# Patient Record
Sex: FEMALE | Race: ASIAN
[De-identification: names, ages, dates, MRNs, and addresses within clinical notes are randomized per-mention and may not be internally consistent; named-entity substitution may affect disease eponyms.]

---

## 2019-02-03 ENCOUNTER — HOSPITAL ENCOUNTER (INPATIENT)
Dept: HOSPITAL 10 - 6WM | Age: 59
LOS: 15 days | DRG: 100 | End: 2019-02-18
Attending: INTERNAL MEDICINE | Admitting: HOSPITALIST
Payer: COMMERCIAL

## 2019-02-03 ENCOUNTER — HOSPITAL ENCOUNTER (INPATIENT)
Dept: HOSPITAL 91 - MS1 | Age: 59
LOS: 15 days | DRG: 100 | End: 2019-02-18
Payer: COMMERCIAL

## 2019-02-03 VITALS
BODY MASS INDEX: 19.45 KG/M2 | HEIGHT: 57 IN | WEIGHT: 90.17 LBS | WEIGHT: 90.17 LBS | BODY MASS INDEX: 19.45 KG/M2 | HEIGHT: 57 IN

## 2019-02-03 DIAGNOSIS — A41.9: ICD-10-CM

## 2019-02-03 DIAGNOSIS — N39.0: ICD-10-CM

## 2019-02-03 DIAGNOSIS — J69.0: ICD-10-CM

## 2019-02-03 DIAGNOSIS — I47.1: ICD-10-CM

## 2019-02-03 DIAGNOSIS — J96.00: ICD-10-CM

## 2019-02-03 DIAGNOSIS — A92.31: ICD-10-CM

## 2019-02-03 DIAGNOSIS — F02.80: ICD-10-CM

## 2019-02-03 DIAGNOSIS — E87.1: ICD-10-CM

## 2019-02-03 DIAGNOSIS — E87.2: ICD-10-CM

## 2019-02-03 DIAGNOSIS — E78.5: ICD-10-CM

## 2019-02-03 DIAGNOSIS — G93.40: ICD-10-CM

## 2019-02-03 DIAGNOSIS — I10: ICD-10-CM

## 2019-02-03 DIAGNOSIS — G40.901: Primary | ICD-10-CM

## 2019-02-03 DIAGNOSIS — Z74.01: ICD-10-CM

## 2019-02-03 DIAGNOSIS — R65.21: ICD-10-CM

## 2019-02-03 DIAGNOSIS — Z66: ICD-10-CM

## 2019-02-03 DIAGNOSIS — D64.9: ICD-10-CM

## 2019-02-03 PROCEDURE — 36600 WITHDRAWAL OF ARTERIAL BLOOD: CPT

## 2019-02-03 PROCEDURE — 86692 HEPATITIS DELTA AGENT ANTBDY: CPT

## 2019-02-03 PROCEDURE — 71045 X-RAY EXAM CHEST 1 VIEW: CPT

## 2019-02-03 PROCEDURE — 83036 HEMOGLOBIN GLYCOSYLATED A1C: CPT

## 2019-02-03 PROCEDURE — 94770: CPT

## 2019-02-03 PROCEDURE — 87086 URINE CULTURE/COLONY COUNT: CPT

## 2019-02-03 PROCEDURE — 87081 CULTURE SCREEN ONLY: CPT

## 2019-02-03 PROCEDURE — 87340 HEPATITIS B SURFACE AG IA: CPT

## 2019-02-03 PROCEDURE — 97165 OT EVAL LOW COMPLEX 30 MIN: CPT

## 2019-02-03 PROCEDURE — 80053 COMPREHEN METABOLIC PANEL: CPT

## 2019-02-03 PROCEDURE — 82553 CREATINE MB FRACTION: CPT

## 2019-02-03 PROCEDURE — 85730 THROMBOPLASTIN TIME PARTIAL: CPT

## 2019-02-03 PROCEDURE — 80076 HEPATIC FUNCTION PANEL: CPT

## 2019-02-03 PROCEDURE — 93306 TTE W/DOPPLER COMPLETE: CPT

## 2019-02-03 PROCEDURE — 85025 COMPLETE CBC W/AUTO DIFF WBC: CPT

## 2019-02-03 PROCEDURE — 94002 VENT MGMT INPAT INIT DAY: CPT

## 2019-02-03 PROCEDURE — 80061 LIPID PANEL: CPT

## 2019-02-03 PROCEDURE — 87040 BLOOD CULTURE FOR BACTERIA: CPT

## 2019-02-03 PROCEDURE — 84484 ASSAY OF TROPONIN QUANT: CPT

## 2019-02-03 PROCEDURE — 92610 EVALUATE SWALLOWING FUNCTION: CPT

## 2019-02-03 PROCEDURE — 86803 HEPATITIS C AB TEST: CPT

## 2019-02-03 PROCEDURE — 82550 ASSAY OF CK (CPK): CPT

## 2019-02-03 PROCEDURE — 82140 ASSAY OF AMMONIA: CPT

## 2019-02-03 PROCEDURE — 94003 VENT MGMT INPAT SUBQ DAY: CPT

## 2019-02-03 PROCEDURE — 86704 HEP B CORE ANTIBODY TOTAL: CPT

## 2019-02-03 PROCEDURE — 80185 ASSAY OF PHENYTOIN TOTAL: CPT

## 2019-02-03 PROCEDURE — 81001 URINALYSIS AUTO W/SCOPE: CPT

## 2019-02-03 PROCEDURE — 80048 BASIC METABOLIC PNL TOTAL CA: CPT

## 2019-02-03 PROCEDURE — 80184 ASSAY OF PHENOBARBITAL: CPT

## 2019-02-03 PROCEDURE — 84439 ASSAY OF FREE THYROXINE: CPT

## 2019-02-03 PROCEDURE — 83735 ASSAY OF MAGNESIUM: CPT

## 2019-02-03 PROCEDURE — 70553 MRI BRAIN STEM W/O & W/DYE: CPT

## 2019-02-03 PROCEDURE — 83605 ASSAY OF LACTIC ACID: CPT

## 2019-02-03 PROCEDURE — 82803 BLOOD GASES ANY COMBINATION: CPT

## 2019-02-03 PROCEDURE — 84443 ASSAY THYROID STIM HORMONE: CPT

## 2019-02-03 PROCEDURE — 82270 OCCULT BLOOD FECES: CPT

## 2019-02-03 PROCEDURE — 76937 US GUIDE VASCULAR ACCESS: CPT

## 2019-02-03 PROCEDURE — 86592 SYPHILIS TEST NON-TREP QUAL: CPT

## 2019-02-03 PROCEDURE — 84100 ASSAY OF PHOSPHORUS: CPT

## 2019-02-03 PROCEDURE — 82962 GLUCOSE BLOOD TEST: CPT

## 2019-02-03 PROCEDURE — C9113 INJ PANTOPRAZOLE SODIUM, VIA: HCPCS

## 2019-02-03 PROCEDURE — 95819 EEG AWAKE AND ASLEEP: CPT

## 2019-02-03 PROCEDURE — 87070 CULTURE OTHR SPECIMN AEROBIC: CPT

## 2019-02-03 PROCEDURE — 36569 INSJ PICC 5 YR+ W/O IMAGING: CPT

## 2019-02-03 PROCEDURE — 85610 PROTHROMBIN TIME: CPT

## 2019-02-04 VITALS — DIASTOLIC BLOOD PRESSURE: 69 MMHG | SYSTOLIC BLOOD PRESSURE: 131 MMHG | RESPIRATION RATE: 19 BRPM | HEART RATE: 71 BPM

## 2019-02-04 VITALS — HEART RATE: 77 BPM | RESPIRATION RATE: 18 BRPM | DIASTOLIC BLOOD PRESSURE: 53 MMHG | SYSTOLIC BLOOD PRESSURE: 95 MMHG

## 2019-02-04 VITALS — DIASTOLIC BLOOD PRESSURE: 80 MMHG | SYSTOLIC BLOOD PRESSURE: 116 MMHG | RESPIRATION RATE: 18 BRPM | HEART RATE: 77 BPM

## 2019-02-04 VITALS — SYSTOLIC BLOOD PRESSURE: 98 MMHG | DIASTOLIC BLOOD PRESSURE: 53 MMHG | HEART RATE: 74 BPM | RESPIRATION RATE: 16 BRPM

## 2019-02-04 VITALS — HEART RATE: 85 BPM

## 2019-02-04 VITALS — HEART RATE: 76 BPM

## 2019-02-04 VITALS — HEART RATE: 77 BPM

## 2019-02-04 VITALS — HEART RATE: 73 BPM

## 2019-02-04 VITALS — SYSTOLIC BLOOD PRESSURE: 103 MMHG | DIASTOLIC BLOOD PRESSURE: 59 MMHG | HEART RATE: 69 BPM | RESPIRATION RATE: 18 BRPM

## 2019-02-04 VITALS — HEART RATE: 70 BPM

## 2019-02-04 LAB
ADD MAN DIFF?: NO
ADD UMIC: YES
ANION GAP: 12 (ref 5–13)
BASOPHIL #: 0.1 10^3/UL (ref 0–0.1)
BASOPHILS %: 0.7 % (ref 0–2)
BLOOD UREA NITROGEN: 4 MG/DL (ref 7–20)
CALCIUM: 8.6 MG/DL (ref 8.4–10.2)
CARBON DIOXIDE: 20 MMOL/L (ref 21–31)
CHLORIDE: 108 MMOL/L (ref 97–110)
CREATININE: 0.3 MG/DL (ref 0.44–1)
EOSINOPHILS #: 0.2 10^3/UL (ref 0–0.5)
EOSINOPHILS %: 2.7 % (ref 0–7)
FREE T4 (FREE THYROXINE): 1.8 NG/DL (ref 0.64–1.79)
GLUCOSE: 79 MG/DL (ref 70–220)
HEMATOCRIT: 31.4 % (ref 37–47)
HEMOGLOBIN: 10.9 G/DL (ref 12–16)
IMMATURE GRANS #M: 0.02 10^3/UL (ref 0–0.03)
IMMATURE GRANS % (M): 0.3 % (ref 0–0.43)
INR: 0.99
LYMPHOCYTES #: 0.9 10^3/UL (ref 0.8–2.9)
LYMPHOCYTES %: 12.2 % (ref 15–51)
MEAN CORPUSCULAR HEMOGLOBIN: 31.1 PG (ref 29–33)
MEAN CORPUSCULAR HGB CONC: 34.7 G/DL (ref 32–37)
MEAN CORPUSCULAR VOLUME: 89.5 FL (ref 82–101)
MEAN PLATELET VOLUME: 8.1 FL (ref 7.4–10.4)
MONOCYTE #: 0.4 10^3/UL (ref 0.3–0.9)
MONOCYTES %: 6.1 % (ref 0–11)
NEUTROPHIL #: 5.5 10^3/UL (ref 1.6–7.5)
NEUTROPHILS %: 78 % (ref 39–77)
NUCLEATED RED BLOOD CELLS #: 0 10^3/UL (ref 0–0)
NUCLEATED RED BLOOD CELLS%: 0 /100WBC (ref 0–0)
PARTIAL THROMBOPLASTIN TIME: 29.2 SEC (ref 23–35)
PLATELET COUNT: 293 10^3/UL (ref 140–415)
POTASSIUM: 3.2 MMOL/L (ref 3.5–5.1)
PROTIME: 13.2 SEC (ref 11.9–14.9)
PT RATIO: 1
RED BLOOD COUNT: 3.51 10^6/UL (ref 4.2–5.4)
RED CELL DISTRIBUTION WIDTH: 11.9 % (ref 11.5–14.5)
SODIUM: 140 MMOL/L (ref 135–144)
THYROID STIMULATING HORMONE: 1.94 MIU/L (ref 0.47–4.68)
UR ASCORBIC ACID: NEGATIVE MG/DL
UR BACTERIA: (no result) /HPF
UR BILIRUBIN (DIP): NEGATIVE MG/DL
UR BLOOD (DIP): (no result) MG/DL
UR BUDDING YEAST: (no result) /HPF
UR CLARITY: (no result)
UR COLOR: YELLOW
UR GLUCOSE (DIP): NEGATIVE MG/DL
UR KETONES (DIP): (no result) MG/DL
UR LEUKOCYTE ESTERASE (DIP): (no result) LEU/UL
UR MUCUS: (no result) /HPF
UR NITRITE (DIP): POSITIVE MG/DL
UR PH (DIP): 7 (ref 5–9)
UR RBC: 1 /HPF (ref 0–5)
UR SPECIFIC GRAVITY (DIP): 1.01 (ref 1–1.03)
UR TOTAL PROTEIN (DIP): NEGATIVE MG/DL
UR UROBILINOGEN (DIP): NEGATIVE MG/DL
UR WBC: 17 /HPF (ref 0–5)
WHITE BLOOD COUNT: 7 10^3/UL (ref 4.8–10.8)

## 2019-02-04 RX ADMIN — CEFTRIAXONE 1 MLS/HR: 1 INJECTION, SOLUTION INTRAVENOUS at 04:12

## 2019-02-04 RX ADMIN — LEVETIRACETAM 1 MLS/HR: 5 INJECTION INTRAVENOUS at 09:54

## 2019-02-04 RX ADMIN — POTASSIUM ACETATE 1 MLS/HR: 3.93 INJECTION, SOLUTION, CONCENTRATE INTRAVENOUS at 18:28

## 2019-02-04 RX ADMIN — POTASSIUM CHLORIDE SCH MLS/HR: 200 INJECTION, SOLUTION INTRAVENOUS at 15:21

## 2019-02-04 RX ADMIN — PANTOPRAZOLE SODIUM SCH MG: 40 INJECTION, POWDER, FOR SOLUTION INTRAVENOUS at 05:17

## 2019-02-04 RX ADMIN — CALCIUM GLUCONATE SCH MLS/HR: 94 INJECTION, SOLUTION INTRAVENOUS at 18:28

## 2019-02-04 RX ADMIN — POTASSIUM CHLORIDE 1 MLS/HR: 200 INJECTION, SOLUTION INTRAVENOUS at 18:28

## 2019-02-04 RX ADMIN — HEPARIN SODIUM 1 UNIT: 5000 INJECTION, SOLUTION INTRAVENOUS; SUBCUTANEOUS at 10:01

## 2019-02-04 RX ADMIN — POTASSIUM ACETATE 1 MLS/HR: 3.93 INJECTION, SOLUTION, CONCENTRATE INTRAVENOUS at 04:12

## 2019-02-04 RX ADMIN — LORAZEPAM 1 MG: 2 INJECTION, SOLUTION INTRAMUSCULAR; INTRAVENOUS at 03:57

## 2019-02-04 RX ADMIN — PANTOPRAZOLE SODIUM 1 MG: 40 INJECTION, POWDER, FOR SOLUTION INTRAVENOUS at 05:17

## 2019-02-04 RX ADMIN — POTASSIUM CHLORIDE SCH MLS/HR: 200 INJECTION, SOLUTION INTRAVENOUS at 18:28

## 2019-02-04 RX ADMIN — CALCIUM GLUCONATE SCH MLS/HR: 94 INJECTION, SOLUTION INTRAVENOUS at 04:12

## 2019-02-04 RX ADMIN — HEPARIN SODIUM 1 UNIT: 5000 INJECTION, SOLUTION INTRAVENOUS; SUBCUTANEOUS at 22:22

## 2019-02-04 RX ADMIN — HEPARIN SODIUM SCH UNIT: 5000 INJECTION, SOLUTION INTRAVENOUS; SUBCUTANEOUS at 22:22

## 2019-02-04 RX ADMIN — POTASSIUM CHLORIDE 1 MLS/HR: 200 INJECTION, SOLUTION INTRAVENOUS at 15:21

## 2019-02-04 RX ADMIN — CEFTRIAXONE SCH MLS/HR: 1 INJECTION, SOLUTION INTRAVENOUS at 04:12

## 2019-02-04 RX ADMIN — HEPARIN SODIUM SCH UNIT: 5000 INJECTION, SOLUTION INTRAVENOUS; SUBCUTANEOUS at 10:01

## 2019-02-04 NOTE — HP
DATE OF ADMISSION: 02/03/2019

 

CHIEF COMPLAINT:  Altered mental status and new seizures.

 

HISTORY OF PRESENT ILLNESS:  A 58-year-old female transferred from outside hospital, prior history of
 severe dementia, bedridden, nonverbal, cared for at home by , who was brought into the [facility] ER with altered mental status.  Apparently, she has had some subjective fevers at home rec
ently.

 

Family became concerned, especially when yesterday, before coming to the ER, they found the patient t
o be less responsive and possible seizure activity, which the patient has never had at home.  EMS was
 called.  She received Versed in the ER and then was transferred to the emergency room.  There, she w
as found with sodium of 127.  White count was slightly high at 11.  She initially had a high lactic a
felisa of 8.4 and she received around 2 liters of IV fluids because of low blood pressure.  The blood pr
essure improved after the IV fluids were given.  She came back to her baseline blood pressure.  She w
as also found with a UTI at the outside hospital.  She also received Keppra 500 mg IV because of the 
concern for seizures.  No upper or lower GI bleeding, no diarrhea or constipation.

 

PAST MEDICAL HISTORY:  Per records, prior history of West Nile virus infection, high cholesterol, hyp
ertension.

 

SOCIAL HISTORY:  Negative for smoking, drinking or IV drug abuse.

 

FAMILY HISTORY:  Positive for Alzheimer's disease in the mother.

 

PAST SURGICAL HISTORY:  Unknown.

 

PHYSICAL EXAMINATION:

VITAL SIGNS:  Today, T-max 97.9, pulse 70, respirations 18, blood pressure 103/59 satting at 98% on r
oom air.

GENERAL:  The patient is lying in bed, no acute distress.

HEENT:  Pupils equal, round and reactive to light.  Extraocular muscles intact.

NECK:  Supple, no thyromegaly.

LUNGS:  Clear to auscultation bilaterally.

CARDIOVASCULAR:  S1, S2 heard.  No rubs or gallops.

ABDOMEN:  Soft, nontender, nondistended.  Normal bowel sounds heard.  No rebound or guarding.

MUSCULOSKELETAL:  No lower extremity edema bilaterally.

NEUROLOGIC:  Unable to fully assess at this time.

 

LABORATORY DATA:  UA shows positive nitrites and positive leukocyte esterase.  WBC 11.2, hemoglobin 1
1.2, hematocrit 33, platelets 375.  Sodium 127, potassium 3.7, chloride 95, CO2 of 16, BUN of 4, crea
tinine 0.89, glucose of 147.  LFTs are essentially normal.  Lipase was normal.  Ammonia was normal.  
Head CT at the outside hospital showed no acute intracranial pathology.

 

ASSESSMENT AND PLAN:  A 58-year-old female with history of severe dementia, bedbound, nonverbal, high
 cholesterol, hypertension who comes in with altered mental status and seizure activity and hyponatre
veronica.

1.  Altered mental status could be secondary to the patient's UTI and new onset seizure activity.  Ad
jayme the patient.  Check TSH, A1c, lipid panel.  Get physical therapy and occupational therapy consult
s.  We will check EEG, do neuro checks every 4 hours.  Will get a neurology consult.  Start the patie
nt on low-dose Keppra for now.

2.  Urinary tract infection.  Again, we will continue IV fluids and broad-spectrum antibiotics.  Foll
ow up final culture results.

3.  Hypernatremia.  Monitor BMP.  Continue IV fluids.

4.  History of hypertension.  Blood pressure stable.  Continue hydralazine as needed p.r.n.

5.  High cholesterol.  Follow up lipid panel.

6.  History of severe dementia.  Monitor for now.  We will get Neurology consult as mentioned above.

 

 

Dictated By: LAURENCE TORRES/MAKENNA

DD:    02/04/2019 05:38:14

DT:    02/04/2019 06:22:05

Conf#: [id number]

DID#:  [id number]

CC: LAURENCE CASTELLANO;*EndCC*

## 2019-02-04 NOTE — CONS
Assessment/Plan


Assessment/Plan


Hospital Course


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 


p/w ams.


She was reported to have a seizure... for which neurology is consulted.





Of note, she was severely hyponatremic on arrival.


UA +





Most clinically consistent with a provoked seizure in the context of 


hyponatremia. 


New onset epilepsy is additionally considered. 





P:


MRI brain with and without contrast for further characterization


Await EEG to evaluate for epileptiform activity


Hold Keppra for now


Ativan IV PRN seizure > 5min or for cluster


Other medical management per primary


[place] as able 


Limit sedating medications where possible


PT/OT/ST as necessary


Will follow clinically





Consultation Date/Type/Reason


Admit Date/Time


Feb 3, 2019 at 22:12


Type of Consult


Neurology


Reason for Consultation


new onset seizures


Requesting Provider:  LAURENCE CASTELLANO


Date/Time of Note


DATE: 2/4/19 


TIME: 15:07





Hx of Present Illness


57 yo F with hx of WNV infection, chronic encephalopathy, and other 


comorbidities who presents with altered mental status and new onset seizures. 





History was obtained from chart review as pt is a poor historian. 





It is elsewhere noted:


HISTORY OF PRESENT ILLNESS:  A 58-year-old female transferred from outside 


hospital, prior history of severe dementia, bedridden, nonverbal, cared for at 


home by , who was brought into the outside hospital ER with altered 


mental status.  Apparently, she has had some subjective fevers at home recently.


 


Family became concerned, especially when yesterday, before coming to the ER, 


they found the patient to be less responsive and possible seizure activity, 


which the patient has never had at home.  EMS was called.  She received Versed 


in the ER and then was transferred to the emergency room.  There, she was found 


with sodium of 127.  White count was slightly high at 11.  She initially had a 


high lactic acid of 8.4 and she received around 2 liters of IV fluids because of


low blood pressure.  The blood pressure improved after the IV fluids were given.


 She came back to her baseline blood pressure.  She was also found with a UTI at


the outside hospital.  She also received Keppra 500 mg IV because of the concern


for seizures.  No upper or lower GI bleeding, no diarrhea or constipation.


Subjective hx not possible:  pt non-verbal





Exam/Review of Systems


Exam


Vitals





Vital Signs


  Date      Temp  Pulse  Resp  B/P (MAP)   Pulse Ox  O2          O2 Flow    FiO2


Time                                                 Delivery    Rate


    2/4/19           76


     12:00


    2/4/19  96.3           16  98/53 (68)       100  Nasal


     11:12                                           Cannula








Intake and Output





2/3/19


2/3/19


2/4/19





1515:00


23:00


07:00





IntakeIntake Total


150 ml





BalanceBalance


150 ml











Exam


PE:


Gen Appearance: No Apparent Distress


HEENT: Normocephalic


Cardiovascular: Regular rate


Abdomen: Soft


Extremities: Dry





NE:


The patient was obtunded and nonverbal. Grimaces to noxious stimuli. 





Cranial nerve examination was limited by mental status. Pupils were equal and 


reactive to light. There was no afferent pupillary defect. Funduscopic 


examination was limited. Face was grossly symmetric, w/ present corneal and 


cough reflexes.





Tone was spastic in BUE. Muscle bulk was diminished. I did not see 


fasciculations. The patient withdrew to noxious stimulation x 4.





Coordination and gait testing was limited by mental status.





Leg reflexes were brisk and symmetric. Richmond's sign was absent. Plantar 


responses were flexor.





Results


Result Diagram:  


2/4/19 0453                                                                     


          2/4/19 0452





Results 24hrs





Laboratory Tests


Test
                     2/4/19
04:48  2/4/19
04:52  2/4/19
04:53  2/4/19
14:15


Thyroid Stimulating           1.940  
  
             
             



Hormone
(TSH)


Prothrombin Time                              13.2


Prothrombin Time Ratio                         1.0


INR International         
                  0.99  
  
             



Normalized
Ratio


Activated                 
                  29.2  
  
             



Partial
Thromboplast


Time


Sodium Level                                   140


Potassium Level                               3.2  L


Chloride Level                                 108


Carbon Dioxide Level                           20  L


Anion Gap                                       12


Blood Urea Nitrogen                             4  L


Creatinine                                   0.30  L


Est Glomerular Filtrat    
             > 60  
       
             



Rate
mL/min


Glucose Level                                   79


Calcium Level                                  8.6


Free Thyroxine                               1.80  H


White Blood Count                                            7.0


Red Blood Count                                            3.51  L


Hemoglobin                                                 10.9  L


Hematocrit                                                 31.4  L


Mean Corpuscular Volume                                     89.5


Mean Corpuscular                                            31.1


Hemoglobin


Mean Corpuscular          
             
                  34.7  
  



Hemoglobin
Concent


Red Cell Distribution                                       11.9


Width


Platelet Count                                               293


Mean Platelet Volume                                         8.1


Immature Granulocytes %                                    0.300


Neutrophils %                                              78.0  H


Lymphocytes %                                              12.2  L


Monocytes %                                                  6.1


Eosinophils %                                                2.7


Basophils %                                                  0.7


Nucleated Red Blood                                          0.0


Cells %


Immature Granulocytes #                                    0.020


Neutrophils #                                                5.5


Lymphocytes #                                                0.9


Monocytes #                                                  0.4


Eosinophils #                                                0.2


Basophils #                                                  0.1


Nucleated Red Blood                                          0.0


Cells #


Urine Color                                                         YELLOW


Urine Clarity                                                       CLOUDY  A


Urine pH                                                                   7.0


Urine Specific Gravity                                                   1.009


Urine Ketones                                                              1+  H


Urine Nitrite                                                       POSITIVE  A


Urine Bilirubin                                                     NEGATIVE


Urine Urobilinogen                                                  NEGATIVE


Urine Leukocyte Esterase                                                   3+  H


Urine Microscopic RBC                                                        1


Urine Microscopic WBC                                                      17  H


Urine Bacteria                                                      FEW  A


Urine Mucus                                                         FEW  A


Urine Yeast (Budding)                                               FEW  A


Urine Hemoglobin                                                           1+  H


Urine Glucose                                                       NEGATIVE


Urine Total Protein                                                 NEGATIVE








Medications


Medication





Current Medications


IV Flush (NS 3 ml) 3 ml PER PROTOCOL IV ;  Start 2/4/19 at 03:30


Ondansetron HCl (Zofran Inj) 4 mg Q6H  PRN IV NAUSEA/VOMITING;  Start 2/4/19 at 


03:30


Acetaminophen (Tylenol Tab) 650 mg Q6H  PRN PO .PAIN 1-3 OR TEMP;  Start 2/4/19 


at 03:30


Acetaminophen/ Hydrocodone Bitart (Norco (5/325)) 1 tab Q6H  PRN PO .MOD PAIN 4-


6;  Start 2/4/19 at 03:30


Morphine Sulfate (morphine SULFATE (PF)) 2 mg Q4H  PRN IV .SEVERE PAIN 7-10;  


Start 2/4/19 at 03:30


Docusate Sodium (Colace) 100 mg Q12H  PRN PO .CONSTIPATION;  Start 2/4/19 at 


03:30


Magnesium Hydroxide (Milk Of Mag) 30 ml DAILY  PRN PO .CONSTIPATION;  Start 


2/4/19 at 03:30


Pantoprazole (Protonix Iv) 40 mg DAILY@06 IV  Last administered on 2/4/19at 


05:17; Admin Dose 40 MG;  Start 2/4/19 at 06:00


Heparin Sodium (Porcine) (Heparin  (5000 Units/1ml)) 5,000 unit Q12 SC  Last 


administered on 2/4/19at 10:01; Admin Dose 5,000 UNIT;  Start 2/4/19 at 09:00


Sodium Chloride 1,000 ml @  75 mls/hr [id number] IV  Last administered on 2/4/19at 


04:12; Admin Dose 75 MLS/HR;  Start 2/4/19 at 03:12


Albuterol/ Ipratropium (Duoneb) 3 ml Q4H RESP THERAPY  PRN HHN SHORTNESS OF 


BREATH;  Start 2/4/19 at 03:30


Hydralazine HCl (Apresoline) 10 mg Q6H  PRN IV ELEVATED BLOOD PRESSURE;  Start 


2/4/19 at 03:30


Ceftriaxone Sodium 50 ml @  100 mls/hr Q24H IVPB  Last administered on 2/4/19at 


04:12; Admin Dose 100 MLS/HR;  Start 2/4/19 at 03:30


Nitroglycerin (Nitroglycerin (Sl Tab) 0.4 Mg) 1 tab Q5M  PRN SL ANGINA;  Start 


2/4/19 at 03:30


Levetiracetam 100 ml @  400 mls/hr Q12 IVPB  Last administered on 2/4/19at 


09:54; Admin Dose 400 MLS/HR;  Start 2/4/19 at 09:00


Lorazepam (Ativan) 1 mg Q2H  PRN IV SEIZURES Last administered on 2/4/19at 


03:57; Admin Dose 1 MG;  Start 2/4/19 at 03:30


Miscellaneous Information (Pending [facility] Order For Wound Care) This patient 


ha... PRN  PRN XX WOUND CARE;  Start 2/4/19 at 05:00


Potassium Chloride 100 ml @  50 mls/hr Q2H IVPB ;  Start 2/4/19 at 14:30;  Stop 


2/4/19 at 18:29





Past Medical History


reviewed


Home Meds


Reported Medications


Cyanocobalamin* (Vitamin B-12*) 50 Mcg Tablet, 50 MCG PO DAILY, TAB


   2/4/19


Amlodipine Besylate* (Amlodipine Besylate*) 2.5 Mg Tablet, 5 MG PO DAILY, #30 


TAB


   2/4/19


Risperidone* (Risperidone*) 0.5 Mg Tablet, 0.5 MG PO DAILY, TAB


   2/4/19


Lamotrigine* (Lamotrigine*) 25 Mg Tablet, 25 MG PO DAILY, TAB


   2/4/19


Benztropine Mesylate* (Benztropine Mesylate*) 2 Mg Tablet, 2 MG PO BID, TAB


   2/4/19


Medications





Current Medications


IV Flush (NS 3 ml) 3 ml PER PROTOCOL IV ;  Start 2/4/19 at 03:30


Ondansetron HCl (Zofran Inj) 4 mg Q6H  PRN IV NAUSEA/VOMITING;  Start 2/4/19 at 


03:30


Acetaminophen (Tylenol Tab) 650 mg Q6H  PRN PO .PAIN 1-3 OR TEMP;  Start 2/4/19 


at 03:30


Acetaminophen/ Hydrocodone Bitart (Norco (5/325)) 1 tab Q6H  PRN PO .MOD PAIN 4-


6;  Start 2/4/19 at 03:30


Morphine Sulfate (morphine SULFATE (PF)) 2 mg Q4H  PRN IV .SEVERE PAIN 7-10;  


Start 2/4/19 at 03:30


Docusate Sodium (Colace) 100 mg Q12H  PRN PO .CONSTIPATION;  Start 2/4/19 at 


03:30


Magnesium Hydroxide (Milk Of Mag) 30 ml DAILY  PRN PO .CONSTIPATION;  Start 


2/4/19 at 03:30


Pantoprazole (Protonix Iv) 40 mg DAILY@06 IV  Last administered on 2/4/19at 


05:17; Admin Dose 40 MG;  Start 2/4/19 at 06:00


Heparin Sodium (Porcine) (Heparin  (5000 Units/1ml)) 5,000 unit Q12 SC  Last 


administered on 2/4/19at 10:01; Admin Dose 5,000 UNIT;  Start 2/4/19 at 09:00


Sodium Chloride 1,000 ml @  75 mls/hr [id number] IV  Last administered on 2/4/19at 


04:12; Admin Dose 75 MLS/HR;  Start 2/4/19 at 03:12


Albuterol/ Ipratropium (Duoneb) 3 ml Q4H RESP THERAPY  PRN HHN SHORTNESS OF JOSELYN


ATH;  Start 2/4/19 at 03:30


Hydralazine HCl (Apresoline) 10 mg Q6H  PRN IV ELEVATED BLOOD PRESSURE;  Start 


2/4/19 at 03:30


Ceftriaxone Sodium 50 ml @  100 mls/hr Q24H IVPB  Last administered on 2/4/19at 


04:12; Admin Dose 100 MLS/HR;  Start 2/4/19 at 03:30


Nitroglycerin (Nitroglycerin (Sl Tab) 0.4 Mg) 1 tab Q5M  PRN SL ANGINA;  Start 


2/4/19 at 03:30


Levetiracetam 100 ml @  400 mls/hr Q12 IVPB  Last administered on 2/4/19at 


09:54; Admin Dose 400 MLS/HR;  Start 2/4/19 at 09:00


Lorazepam (Ativan) 1 mg Q2H  PRN IV SEIZURES Last administered on 2/4/19at 


03:57; Admin Dose 1 MG;  Start 2/4/19 at 03:30


Miscellaneous Information (Pending [facility] Order For Wound Care) This patient 


ha... PRN  PRN XX WOUND CARE;  Start 2/4/19 at 05:00


Potassium Chloride 100 ml @  50 mls/hr Q2H IVPB ;  Start 2/4/19 at 14:30;  Stop 


2/4/19 at 18:29


Allergies:  


Coded Allergies:  


     No Known Allergy (Unverified , 2/4/19)





Past Surgical History


reviewed





Social History


reviewed


Smoking Status:  Never smoker











JULIETA SINGH NP           Feb 4, 2019 15:07


JOSE FRANCOIS                  Feb 4, 2019 17:53

## 2019-02-04 NOTE — PN
Date/Time of Note


Date/Time of Note


DATE: 2/4/19 


TIME: 12:30





Assessment/Plan


VTE Prophylaxis


Risk score (from Nsg)>0 risk:  3


SCD applied (from Ns):  Yes


Pharmacological prophylaxis:  heparin





Lines/Catheters


IV Catheter Type (from Nrs):  Saline Lock





Assessment/Plan


Hospital Course


S: sl;eeping comfortably, was medicatied with ativaln for agitation





O:


Constitutional:  sleeping, did not try to arouse


Head:  atraumatic, normocephalic, NC 


Neck:  non-tender, supple


Respiratory:  clear to auscultation, diminished 


Cardiovascular:  regular rate and rhythm


Gastrointestinal:  S/ NT / ND / +BS


Extremities:  no edema, good radial pulses, frail, 





assessment and Plan:


57 yo F with a slow early onset dementia, cause unknown who is non verbal at 


baseline who was gregg in for abnormal jerking behaviour concerning for 


seizures 





1. Altered mentation with concern for possible new onset seizures


   -Brain CT at outside hospital negative per report


2.  Urinary tract infection per report


3.  Chronic dementia/encephalopathy, nonverbal at baseline?


4.  History of West Nile virus infection, causing #3?


5.  Chronic dyslipidemia


6.  Chronic hypertension


7. Chronic psychiatric d/c on risperdone with benztropine for tardive dyskinesia


?


   -dose of benztropine recently reduced 





Plan :


   -we will order MRI of the brain, continue antiseizure medications, await 


neurology review


   - concerned that reduction in dose of benztropine may be contributory


   - does not want chest compressions but wants short term intubation if 


neccesary and meds administered. Does not want shocks as well


   -Continue broad-spectrum antibiotics, follow-up urinalysis and urine culture,


further interventions per course.


Result Diagram:  


2/4/19 0453                                                                     


          2/4/19 0452





Results 24hrs





Laboratory Tests


        Test
                                2/4/19
04:52  2/4/19
04:53


        Prothrombin Time                           13.2


        Prothrombin Time Ratio                      1.0


        INR International Normalized
Ratio        0.99  
  



        Activated Partial
Thromboplast Time       29.2  
  



        Sodium Level                                140


        Potassium Level                            3.2  L


        Chloride Level                              108


        Carbon Dioxide Level                        20  L


        Anion Gap                                    12


        Blood Urea Nitrogen                          4  L


        Creatinine                                0.30  L


        Est Glomerular Filtrat Rate
mL/min   > 60  
       



        Glucose Level                                79


        Calcium Level                               8.6


        Free Thyroxine                            1.80  H


        White Blood Count                                         7.0


        Red Blood Count                                         3.51  L


        Hemoglobin                                              10.9  L


        Hematocrit                                              31.4  L


        Mean Corpuscular Volume                                  89.5


        Mean Corpuscular Hemoglobin                              31.1


        Mean Corpuscular Hemoglobin
Concent  
                  34.7  



        Red Cell Distribution Width                              11.9


        Platelet Count                                            293


        Mean Platelet Volume                                      8.1


        Immature Granulocytes %                                 0.300


        Neutrophils %                                           78.0  H


        Lymphocytes %                                           12.2  L


        Monocytes %                                               6.1


        Eosinophils %                                             2.7


        Basophils %                                               0.7


        Nucleated Red Blood Cells %                               0.0


        Immature Granulocytes #                                 0.020


        Neutrophils #                                             5.5


        Lymphocytes #                                             0.9


        Monocytes #                                               0.4


        Eosinophils #                                             0.2


        Basophils #                                               0.1


        Nucleated Red Blood Cells #                               0.0








Exam/Review of Systems


Exam


Vitals





Vital Signs


  Date      Temp  Pulse  Resp  B/P (MAP)   Pulse Ox  O2          O2 Flow    FiO2


Time                                                 Delivery    Rate


    2/4/19  96.3     74    16  98/53 (68)       100  Nasal


     11:12                                           Cannula








Intake and Output





2/3/19


2/3/19


2/4/19





1515:00


23:00


07:00





IntakeIntake Total


150 ml





BalanceBalance


150 ml














Results


Results 24hrs





Laboratory Tests


        Test
                                2/4/19
04:52  2/4/19
04:53


        Prothrombin Time                           13.2


        Prothrombin Time Ratio                      1.0


        INR International Normalized
Ratio        0.99  
  



        Activated Partial
Thromboplast Time       29.2  
  



        Sodium Level                                140


        Potassium Level                            3.2  L


        Chloride Level                              108


        Carbon Dioxide Level                        20  L


        Anion Gap                                    12


        Blood Urea Nitrogen                          4  L


        Creatinine                                0.30  L


        Est Glomerular Filtrat Rate
mL/min   > 60  
       



        Glucose Level                                79


        Calcium Level                               8.6


        Free Thyroxine                            1.80  H


        White Blood Count                                         7.0


        Red Blood Count                                         3.51  L


        Hemoglobin                                              10.9  L


        Hematocrit                                              31.4  L


        Mean Corpuscular Volume                                  89.5


        Mean Corpuscular Hemoglobin                              31.1


        Mean Corpuscular Hemoglobin
Concent  
                  34.7  



        Red Cell Distribution Width                              11.9


        Platelet Count                                            293


        Mean Platelet Volume                                      8.1


        Immature Granulocytes %                                 0.300


        Neutrophils %                                           78.0  H


        Lymphocytes %                                           12.2  L


        Monocytes %                                               6.1


        Eosinophils %                                             2.7


        Basophils %                                               0.7


        Nucleated Red Blood Cells %                               0.0


        Immature Granulocytes #                                 0.020


        Neutrophils #                                             5.5


        Lymphocytes #                                             0.9


        Monocytes #                                               0.4


        Eosinophils #                                             0.2


        Basophils #                                               0.1


        Nucleated Red Blood Cells #                               0.0








Medications


Medication





Current Medications


IV Flush (NS 3 ml) 3 ml PER PROTOCOL IV ;  Start 2/4/19 at 03:30


Ondansetron HCl (Zofran Inj) 4 mg Q6H  PRN IV NAUSEA/VOMITING;  Start 2/4/19 at 


03:30


Acetaminophen (Tylenol Tab) 650 mg Q6H  PRN PO .PAIN 1-3 OR TEMP;  Start 2/4/19 


at 03:30


Acetaminophen/ Hydrocodone Bitart (Norco (5/325)) 1 tab Q6H  PRN PO .MOD PAIN 4-


6;  Start 2/4/19 at 03:30


Morphine Sulfate (morphine SULFATE (PF)) 2 mg Q4H  PRN IV .SEVERE PAIN 7-10;  


Start 2/4/19 at 03:30


Docusate Sodium (Colace) 100 mg Q12H  PRN PO .CONSTIPATION;  Start 2/4/19 at 


03:30


Magnesium Hydroxide (Milk Of Mag) 30 ml DAILY  PRN PO .CONSTIPATION;  Start 


2/4/19 at 03:30


Pantoprazole (Protonix Iv) 40 mg DAILY@06 IV  Last administered on 2/4/19at 


05:17; Admin Dose 40 MG;  Start 2/4/19 at 06:00


Heparin Sodium (Porcine) (Heparin  (5000 Units/1ml)) 5,000 unit Q12 SC  Last 


administered on 2/4/19at 10:01; Admin Dose 5,000 UNIT;  Start 2/4/19 at 09:00


Sodium Chloride 1,000 ml @  75 mls/hr [id number] IV  Last administered on 2/4/19at 


04:12; Admin Dose 75 MLS/HR;  Start 2/4/19 at 03:12


Albuterol/ Ipratropium (Duoneb) 3 ml Q4H RESP THERAPY  PRN HHN SHORTNESS OF 


BREATH;  Start 2/4/19 at 03:30


Hydralazine HCl (Apresoline) 10 mg Q6H  PRN IV ELEVATED BLOOD PRESSURE;  Start 


2/4/19 at 03:30


Ceftriaxone Sodium 50 ml @  100 mls/hr Q24H IVPB  Last administered on 2/4/19at 


04:12; Admin Dose 100 MLS/HR;  Start 2/4/19 at 03:30


Nitroglycerin (Nitroglycerin (Sl Tab) 0.4 Mg) 1 tab Q5M  PRN SL ANGINA;  Start 


2/4/19 at 03:30


Levetiracetam 100 ml @  400 mls/hr Q12 IVPB  Last administered on 2/4/19at 


09:54; Admin Dose 400 MLS/HR;  Start 2/4/19 at 09:00


Lorazepam (Ativan) 1 mg Q2H  PRN IV SEIZURES Last administered on 2/4/19at 


03:57; Admin Dose 1 MG;  Start 2/4/19 at 03:30


Miscellaneous Information (Pending Santyl Order For Wound Care) This patient 


ha... PRN  PRN XX WOUND CARE;  Start 2/4/19 at 05:00











TEJAS SALMERON                 Feb 4, 2019 12:30

## 2019-02-05 VITALS — HEART RATE: 70 BPM | DIASTOLIC BLOOD PRESSURE: 96 MMHG | RESPIRATION RATE: 21 BRPM | SYSTOLIC BLOOD PRESSURE: 141 MMHG

## 2019-02-05 VITALS — HEART RATE: 75 BPM

## 2019-02-05 VITALS — DIASTOLIC BLOOD PRESSURE: 75 MMHG | RESPIRATION RATE: 19 BRPM | SYSTOLIC BLOOD PRESSURE: 125 MMHG | HEART RATE: 86 BPM

## 2019-02-05 VITALS — HEART RATE: 57 BPM | SYSTOLIC BLOOD PRESSURE: 145 MMHG | RESPIRATION RATE: 18 BRPM | DIASTOLIC BLOOD PRESSURE: 95 MMHG

## 2019-02-05 VITALS — SYSTOLIC BLOOD PRESSURE: 113 MMHG | HEART RATE: 85 BPM | RESPIRATION RATE: 15 BRPM | DIASTOLIC BLOOD PRESSURE: 59 MMHG

## 2019-02-05 VITALS — SYSTOLIC BLOOD PRESSURE: 115 MMHG | DIASTOLIC BLOOD PRESSURE: 81 MMHG | RESPIRATION RATE: 14 BRPM | HEART RATE: 53 BPM

## 2019-02-05 VITALS — SYSTOLIC BLOOD PRESSURE: 106 MMHG | HEART RATE: 57 BPM | RESPIRATION RATE: 14 BRPM | DIASTOLIC BLOOD PRESSURE: 80 MMHG

## 2019-02-05 VITALS — SYSTOLIC BLOOD PRESSURE: 59 MMHG | HEART RATE: 63 BPM | DIASTOLIC BLOOD PRESSURE: 49 MMHG | RESPIRATION RATE: 14 BRPM

## 2019-02-05 VITALS — DIASTOLIC BLOOD PRESSURE: 53 MMHG | HEART RATE: 62 BPM | RESPIRATION RATE: 14 BRPM | SYSTOLIC BLOOD PRESSURE: 64 MMHG

## 2019-02-05 VITALS — HEART RATE: 59 BPM | DIASTOLIC BLOOD PRESSURE: 89 MMHG | SYSTOLIC BLOOD PRESSURE: 136 MMHG | RESPIRATION RATE: 14 BRPM

## 2019-02-05 VITALS — HEART RATE: 66 BPM

## 2019-02-05 VITALS — SYSTOLIC BLOOD PRESSURE: 133 MMHG | RESPIRATION RATE: 24 BRPM | DIASTOLIC BLOOD PRESSURE: 93 MMHG | HEART RATE: 61 BPM

## 2019-02-05 VITALS — DIASTOLIC BLOOD PRESSURE: 73 MMHG | RESPIRATION RATE: 18 BRPM | SYSTOLIC BLOOD PRESSURE: 119 MMHG | HEART RATE: 73 BPM

## 2019-02-05 VITALS — HEART RATE: 69 BPM | DIASTOLIC BLOOD PRESSURE: 71 MMHG | SYSTOLIC BLOOD PRESSURE: 120 MMHG | RESPIRATION RATE: 18 BRPM

## 2019-02-05 VITALS — HEART RATE: 71 BPM

## 2019-02-05 VITALS — RESPIRATION RATE: 14 BRPM

## 2019-02-05 VITALS — RESPIRATION RATE: 14 BRPM | HEART RATE: 58 BPM | DIASTOLIC BLOOD PRESSURE: 103 MMHG | SYSTOLIC BLOOD PRESSURE: 172 MMHG

## 2019-02-05 VITALS — HEART RATE: 64 BPM | DIASTOLIC BLOOD PRESSURE: 59 MMHG | RESPIRATION RATE: 14 BRPM | SYSTOLIC BLOOD PRESSURE: 71 MMHG

## 2019-02-05 VITALS — HEART RATE: 56 BPM

## 2019-02-05 VITALS — RESPIRATION RATE: 14 BRPM | SYSTOLIC BLOOD PRESSURE: 56 MMHG | HEART RATE: 63 BPM | DIASTOLIC BLOOD PRESSURE: 47 MMHG

## 2019-02-05 VITALS — SYSTOLIC BLOOD PRESSURE: 91 MMHG | HEART RATE: 63 BPM | RESPIRATION RATE: 8 BRPM | DIASTOLIC BLOOD PRESSURE: 68 MMHG

## 2019-02-05 VITALS — RESPIRATION RATE: 14 BRPM | SYSTOLIC BLOOD PRESSURE: 154 MMHG | DIASTOLIC BLOOD PRESSURE: 94 MMHG | HEART RATE: 57 BPM

## 2019-02-05 VITALS — SYSTOLIC BLOOD PRESSURE: 122 MMHG | DIASTOLIC BLOOD PRESSURE: 88 MMHG | HEART RATE: 56 BPM | RESPIRATION RATE: 14 BRPM

## 2019-02-05 VITALS — DIASTOLIC BLOOD PRESSURE: 80 MMHG | RESPIRATION RATE: 14 BRPM | SYSTOLIC BLOOD PRESSURE: 124 MMHG | HEART RATE: 56 BPM

## 2019-02-05 VITALS — SYSTOLIC BLOOD PRESSURE: 70 MMHG | DIASTOLIC BLOOD PRESSURE: 59 MMHG | RESPIRATION RATE: 14 BRPM | HEART RATE: 63 BPM

## 2019-02-05 VITALS — DIASTOLIC BLOOD PRESSURE: 77 MMHG | RESPIRATION RATE: 15 BRPM | HEART RATE: 60 BPM | SYSTOLIC BLOOD PRESSURE: 134 MMHG

## 2019-02-05 VITALS — HEART RATE: 74 BPM

## 2019-02-05 LAB
ADD MAN DIFF?: NO
ALANINE AMINOTRANSFERASE: 19 IU/L (ref 13–69)
ALBUMIN: 3.2 G/DL (ref 3.3–4.9)
ALKALINE PHOSPHATASE: < 20 IU/L (ref 42–121)
ALLEN TEST: (no result)
ANION GAP: 7 (ref 5–13)
ARTERIAL BASE EXCESS: -7.2 MMOL/L (ref -3–3)
ARTERIAL BLOOD GAS OXYGEN SAT: 99.5 MMHG (ref 95–98)
ARTERIAL COHB: 0.3 % (ref 0–3)
ARTERIAL FRACTION OF OXYHGB: 98.8 % (ref 93–99)
ARTERIAL HCO3: 16 MMOL/L (ref 22–26)
ARTERIAL METHB: 0.4 % (ref 0–1.5)
ARTERIAL PCO2: 26.4 MMHG (ref 35–45)
ASPARTATE AMINO TRANSFERASE: 26 IU/L (ref 15–46)
BASOPHIL #: 0.1 10^3/UL (ref 0–0.1)
BASOPHILS %: 0.5 % (ref 0–2)
BILIRUBIN,DIRECT: 0 MG/DL (ref 0–0.2)
BILIRUBIN,TOTAL: 0.1 MG/DL (ref 0.2–1.3)
BLOOD GAS LOW PEEP SETTING: 5 CMH2O
BLOOD GAS PIP: 23
BLOOD GAS TIDAL VOLUME: 450 ML
BLOOD UREA NITROGEN: 3 MG/DL (ref 7–20)
CALCIUM: 8.8 MG/DL (ref 8.4–10.2)
CARBON DIOXIDE: 19 MMOL/L (ref 21–31)
CHLORIDE: 105 MMOL/L (ref 97–110)
CHOL/HDL RATIO: 3.8 RATIO
CHOLESTEROL: 173 MG/DL (ref 100–200)
CREATININE: 0.27 MG/DL (ref 0.44–1)
EOSINOPHILS #: 0.1 10^3/UL (ref 0–0.5)
EOSINOPHILS %: 1.3 % (ref 0–7)
FIO2: 100 %
GLUCOSE: 74 MG/DL (ref 70–220)
HDL CHOLESTEROL: 45 MG/DL (ref 35–98)
HEMATOCRIT: 35.8 % (ref 37–47)
HEMOGLOBIN A1C: 5.5 % (ref 0–5.9)
HEMOGLOBIN: 12.4 G/DL (ref 12–16)
IMMATURE GRANS #M: 0.02 10^3/UL (ref 0–0.03)
IMMATURE GRANS % (M): 0.2 % (ref 0–0.43)
LDL CHOLESTEROL,CALCULATED: 116 MG/DL
LYMPHOCYTES #: 0.8 10^3/UL (ref 0.8–2.9)
LYMPHOCYTES %: 7.3 % (ref 15–51)
MAGNESIUM: 1.7 MG/DL (ref 1.7–2.5)
MEAN CORPUSCULAR HEMOGLOBIN: 31.4 PG (ref 29–33)
MEAN CORPUSCULAR HGB CONC: 34.6 G/DL (ref 32–37)
MEAN CORPUSCULAR VOLUME: 90.6 FL (ref 82–101)
MEAN PLATELET VOLUME: 9.5 FL (ref 7.4–10.4)
MODE: (no result)
MONOCYTE #: 0.9 10^3/UL (ref 0.3–0.9)
MONOCYTES %: 7.8 % (ref 0–11)
NEUTROPHIL #: 9.3 10^3/UL (ref 1.6–7.5)
NEUTROPHILS %: 82.9 % (ref 39–77)
NUCLEATED RED BLOOD CELLS #: 0 10^3/UL (ref 0–0)
NUCLEATED RED BLOOD CELLS%: 0 /100WBC (ref 0–0)
O2 A-A PPRESDIFF RESPIRATORY: 60.7 MMHG (ref 7–24)
PHENYTOIN (DILANTIN): 27 UG/ML (ref 10–20)
PHOSPHORUS: 4.1 MG/DL (ref 2.5–4.9)
PLATELET COUNT: 270 10^3/UL (ref 140–415)
POTASSIUM: 4.4 MMOL/L (ref 3.5–5.1)
RED BLOOD COUNT: 3.95 10^6/UL (ref 4.2–5.4)
RED CELL DISTRIBUTION WIDTH: 11.9 % (ref 11.5–14.5)
SODIUM: 131 MMOL/L (ref 135–144)
THYROID STIMULATING HORMONE: 1.56 MIU/L (ref 0.47–4.68)
TOTAL PROTEIN: 5.8 G/DL (ref 6.1–8.1)
TRIGLYCERIDES: 60 MG/DL (ref 0–149)
WHITE BLOOD COUNT: 11.2 10^3/UL (ref 4.8–10.8)

## 2019-02-05 PROCEDURE — 5A1955Z RESPIRATORY VENTILATION, GREATER THAN 96 CONSECUTIVE HOURS: ICD-10-PCS | Performed by: EMERGENCY MEDICINE

## 2019-02-05 PROCEDURE — 5A1955Z RESPIRATORY VENTILATION, GREATER THAN 96 CONSECUTIVE HOURS: ICD-10-PCS

## 2019-02-05 PROCEDURE — 0BH17EZ INSERTION OF ENDOTRACHEAL AIRWAY INTO TRACHEA, VIA NATURAL OR ARTIFICIAL OPENING: ICD-10-PCS

## 2019-02-05 PROCEDURE — 0BH17EZ INSERTION OF ENDOTRACHEAL AIRWAY INTO TRACHEA, VIA NATURAL OR ARTIFICIAL OPENING: ICD-10-PCS | Performed by: EMERGENCY MEDICINE

## 2019-02-05 RX ADMIN — HEPARIN SODIUM 1 UNIT: 5000 INJECTION, SOLUTION INTRAVENOUS; SUBCUTANEOUS at 10:52

## 2019-02-05 RX ADMIN — CEFTRIAXONE SCH MLS/HR: 1 INJECTION, SOLUTION INTRAVENOUS at 04:12

## 2019-02-05 RX ADMIN — LEVETIRACETAM 1 MLS/HR: 5 INJECTION INTRAVENOUS at 23:00

## 2019-02-05 RX ADMIN — LEVETIRACETAM 1 MLS/HR: 5 INJECTION INTRAVENOUS at 22:50

## 2019-02-05 RX ADMIN — Medication SCH MLS/HR: at 21:00

## 2019-02-05 RX ADMIN — SODIUM CHLORIDE 1 MLS/HR: 900 INJECTION, SOLUTION INTRAVENOUS at 08:57

## 2019-02-05 RX ADMIN — PROPOFOL 1 MLS/HR: 10 INJECTION, EMULSION INTRAVENOUS at 21:00

## 2019-02-05 RX ADMIN — PROPOFOL SCH MLS/HR: 10 INJECTION, EMULSION INTRAVENOUS at 21:00

## 2019-02-05 RX ADMIN — LEVETIRACETAM 1 MLS/HR: 5 INJECTION INTRAVENOUS at 08:57

## 2019-02-05 RX ADMIN — PHENOBARBITAL SODIUM 1 MLS/HR: 65 INJECTION INTRAMUSCULAR; INTRAVENOUS at 19:30

## 2019-02-05 RX ADMIN — ASCORBIC ACID, VITAMIN A PALMITATE, CHOLECALCIFEROL, THIAMINE HYDROCHLORIDE, RIBOFLAVIN-5 PHOSPHATE SODIUM, PYRIDOXINE HYDROCHLORIDE, NIACINAMIDE, DEXPANTHENOL, ALPHA-TOCOPHEROL ACETATE, VITAMIN K1, FOLIC ACID, BIOTIN, CYANOCOBALAMIN 1 MLS/HR: 200; 3300; 200; 6; 3.6; 6; 40; 15; 10; 150; 600; 60; 5 INJECTION, SOLUTION INTRAVENOUS at 17:26

## 2019-02-05 RX ADMIN — Medication 1 MLS/HR: at 21:00

## 2019-02-05 RX ADMIN — CALCIUM GLUCONATE SCH MLS/HR: 94 INJECTION, SOLUTION INTRAVENOUS at 06:43

## 2019-02-05 RX ADMIN — PANTOPRAZOLE SODIUM 1 MG: 40 INJECTION, POWDER, FOR SOLUTION INTRAVENOUS at 06:43

## 2019-02-05 RX ADMIN — CEFTRIAXONE 1 MLS/HR: 1 INJECTION, SOLUTION INTRAVENOUS at 04:12

## 2019-02-05 RX ADMIN — LEVETIRACETAM SCH MLS/HR: 5 INJECTION INTRAVENOUS at 22:50

## 2019-02-05 RX ADMIN — FOLIC ACID SCH MLS/HR: 5 INJECTION, SOLUTION INTRAMUSCULAR; INTRAVENOUS; SUBCUTANEOUS at 17:26

## 2019-02-05 RX ADMIN — LEVETIRACETAM SCH MLS/HR: 5 INJECTION INTRAVENOUS at 23:00

## 2019-02-05 RX ADMIN — HEPARIN SODIUM SCH UNIT: 5000 INJECTION, SOLUTION INTRAVENOUS; SUBCUTANEOUS at 22:00

## 2019-02-05 RX ADMIN — HEPARIN SODIUM 1 UNIT: 5000 INJECTION, SOLUTION INTRAVENOUS; SUBCUTANEOUS at 22:00

## 2019-02-05 RX ADMIN — HEPARIN SODIUM SCH UNIT: 5000 INJECTION, SOLUTION INTRAVENOUS; SUBCUTANEOUS at 10:52

## 2019-02-05 RX ADMIN — PANTOPRAZOLE SODIUM SCH MG: 40 INJECTION, POWDER, FOR SOLUTION INTRAVENOUS at 06:43

## 2019-02-05 RX ADMIN — POTASSIUM ACETATE 1 MLS/HR: 3.93 INJECTION, SOLUTION, CONCENTRATE INTRAVENOUS at 06:43

## 2019-02-05 RX ADMIN — LEVETIRACETAM SCH MLS/HR: 5 INJECTION INTRAVENOUS at 08:57

## 2019-02-05 NOTE — PN
Date/Time of Note


Date/Time of Note


DATE: 2/5/19 


TIME: 09:17





Assessment/Plan


VTE Prophylaxis


Risk score (from Nsg)>0 risk:  3


SCD applied (from Nsg):  Yes


Pharmacological prophylaxis:  heparin





Lines/Catheters


IV Catheter Type (from Nrsg):  Saline Lock


Urinary Cath still in place:  No





Assessment/Plan


Hospital Course


S:  sleeping deeply, hard to arouse, was given dilantin for persistent seizures 


on EEG, per  at bedside, was having jerklike movements again before 


dilantin was given 





O:


Constitutional:  sleeping, will barely open eyes with aggressive  stimulation, 


no seizure like activity


Head:  atraumatic, normocephalic, NC 


Neck:  non-tender, supple


Respiratory:  coarse


Cardiovascular:  regular rate and rhythm


Gastrointestinal:  S/ NT / ND / +BS


Extremities:  no edema, good radial pulses, frail, 





assessment and Plan:


57 yo F with a slow early onset dementia, cause unknown who is non verbal at 


baseline who was brought in for abnormal jerking behavior concerning for 


seizures 





1. Altered mentation with concern for possible new onset seizures


   -Brain CT at outside hospital negative per report


   -MRI pending


   -EEG confirms ongoing seizure, 


   -dilantin added to regimen Neurology managing, f/u recs





2.  Nitrite + Urinary tract infection 


   -continue empiric abx, continue to monitor cultures





3.  Chronic dementia/encephalopathy, 


   -nonverbal and bedbound at baseline.  Of gradual onset, no organic cause 


found despite extensive workup


   -cared for at home by  and children 24hours





4.  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





5.  Chronic dyslipidemia





6.  Chronic hypertension





7. Chronic psychiatric d/c on risperdone with benztropine for tardive dyskinesia


?


   -dose of benztropine recently reduced 





Plan :


   -appreciate neurology review


   -monitor mentation closely, d/t sedating effect of antiseizures, patient may 


be unable to maintain airway


   -patient also unable to eat d/t aspiration risk, continue d5 1/2 NS


   -f/u MRI findings


   -may need TPN for short term ???


   -POC discussed in detail with .


Result Diagram:  


2/5/19 0659                                                                     


          2/5/19 0658





Results 24hrs





Laboratory Tests


 Test
                                2/4/19
14:15  2/5/19
06:58  2/5/19
06:59


 Urine Color                          YELLOW


 Urine Clarity                        CLOUDY  A


 Urine pH                                    7.0


 Urine Specific Gravity                    1.009


 Urine Ketones                               1+  H


 Urine Nitrite                        POSITIVE  A


 Urine Bilirubin                      NEGATIVE


 Urine Urobilinogen                   NEGATIVE


 Urine Leukocyte Esterase                    3+  H


 Urine Microscopic RBC                         1


 Urine Microscopic WBC                       17  H


 Urine Bacteria                       FEW  A


 Urine Mucus                          FEW  A


 Urine Yeast (Budding)                FEW  A


 Urine Hemoglobin                            1+  H


 Urine Glucose                        NEGATIVE


 Urine Total Protein                  NEGATIVE


 Sodium Level                                             131  L


 Potassium Level                                           4.4


 Chloride Level                                            105


 Carbon Dioxide Level                                      19  L


 Anion Gap                                                   7


 Blood Urea Nitrogen                                        3  L


 Creatinine                                              0.27  L


 Est Glomerular Filtrat Rate
mL/min   
             > 60  
       



 Glucose Level                                              74


 Calcium Level                                             8.8


 Phosphorus Level                                          4.1


 Magnesium Level                                           1.7


 Triglycerides Level                                        60


 Cholesterol Level                                         173


 LDL Cholesterol, Calculated                               116


 HDL Cholesterol                                            45


 Cholesterol/HDL Ratio                                     3.8


 Thyroid Stimulating Hormone
(TSH)    
                 1.560  
  



 White Blood Count                                                    11.2  #H


 Red Blood Count                                                       3.95  L


 Hemoglobin                                                             12.4


 Hematocrit                                                            35.8  L


 Mean Corpuscular Volume                                                90.6


 Mean Corpuscular Hemoglobin                                            31.4


 Mean Corpuscular Hemoglobin
Concent  
             
                  34.6  



 Red Cell Distribution Width                                            11.9


 Platelet Count                                                          270


 Mean Platelet Volume                                                    9.5


 Immature Granulocytes %                                               0.200


 Neutrophils %                                                         82.9  H


 Lymphocytes %                                                          7.3  L


 Monocytes %                                                             7.8


 Eosinophils %                                                           1.3


 Basophils %                                                             0.5


 Nucleated Red Blood Cells %                                             0.0


 Immature Granulocytes #                                               0.020


 Neutrophils #                                                          9.3  H


 Lymphocytes #                                                           0.8


 Monocytes #                                                             0.9


 Eosinophils #                                                           0.1


 Basophils #                                                             0.1


 Nucleated Red Blood Cells #                                             0.0








Exam/Review of Systems


Exam


Vitals





Vital Signs


  Date      Temp  Pulse  Resp  B/P (MAP)   Pulse Ox  O2          O2 Flow    FiO2


Time                                                 Delivery    Rate


    2/5/19           71


     08:57


    2/5/19  97.3           18      119/73        99


     07:25                           (88)


    2/4/19                                           Nasal


     15:11                                           Cannula








Intake and Output





2/4/19 2/4/19 2/5/19





1515:00


23:00


07:00





IntakeIntake Total


0 ml


750 ml





OutputOutput Total


250 ml





BalanceBalance


-250 ml


750 ml














Results


Results 24hrs





Laboratory Tests


 Test
                                2/4/19
14:15  2/5/19
06:58  2/5/19
06:59


 Urine Color                          YELLOW


 Urine Clarity                        CLOUDY  A


 Urine pH                                    7.0


 Urine Specific Gravity                    1.009


 Urine Ketones                               1+  H


 Urine Nitrite                        POSITIVE  A


 Urine Bilirubin                      NEGATIVE


 Urine Urobilinogen                   NEGATIVE


 Urine Leukocyte Esterase                    3+  H


 Urine Microscopic RBC                         1


 Urine Microscopic WBC                       17  H


 Urine Bacteria                       FEW  A


 Urine Mucus                          FEW  A


 Urine Yeast (Budding)                FEW  A


 Urine Hemoglobin                            1+  H


 Urine Glucose                        NEGATIVE


 Urine Total Protein                  NEGATIVE


 Sodium Level                                             131  L


 Potassium Level                                           4.4


 Chloride Level                                            105


 Carbon Dioxide Level                                      19  L


 Anion Gap                                                   7


 Blood Urea Nitrogen                                        3  L


 Creatinine                                              0.27  L


 Est Glomerular Filtrat Rate
mL/min   
             > 60  
       



 Glucose Level                                              74


 Calcium Level                                             8.8


 Phosphorus Level                                          4.1


 Magnesium Level                                           1.7


 Triglycerides Level                                        60


 Cholesterol Level                                         173


 LDL Cholesterol, Calculated                               116


 HDL Cholesterol                                            45


 Cholesterol/HDL Ratio                                     3.8


 Thyroid Stimulating Hormone
(TSH)    
                 1.560  
  



 White Blood Count                                                    11.2  #H


 Red Blood Count                                                       3.95  L


 Hemoglobin                                                             12.4


 Hematocrit                                                            35.8  L


 Mean Corpuscular Volume                                                90.6


 Mean Corpuscular Hemoglobin                                            31.4


 Mean Corpuscular Hemoglobin
Concent  
             
                  34.6  



 Red Cell Distribution Width                                            11.9


 Platelet Count                                                          270


 Mean Platelet Volume                                                    9.5


 Immature Granulocytes %                                               0.200


 Neutrophils %                                                         82.9  H


 Lymphocytes %                                                          7.3  L


 Monocytes %                                                             7.8


 Eosinophils %                                                           1.3


 Basophils %                                                             0.5


 Nucleated Red Blood Cells %                                             0.0


 Immature Granulocytes #                                               0.020


 Neutrophils #                                                          9.3  H


 Lymphocytes #                                                           0.8


 Monocytes #                                                             0.9


 Eosinophils #                                                           0.1


 Basophils #                                                             0.1


 Nucleated Red Blood Cells #                                             0.0








Imaging


Imaging


EEG NOTE


Report Details


DATE OF TEST: 2/4/19





HISTORY:


The patient is a 58-year-old F who presents with altered mental status and 


seizure.





This EEG is requested to rule out nonconvulsive status epilepticus.





SEDATION:


None.





CONDITIONS OF RECORDING:


This EEG was recorded digitally on the [employer] machine, using the 


International 10-20 System of electrodes plus anterior temporals and Nz.





STATES SAMPLED:


Lethargic.








FINDINGS:


The background is somewhat discontinuous, though grossly 


symmetric...predominated by polymorphic delta activity..





There are generalized periodic epileptiform discharges throughout.





A posterior dominant rhythm is absent.





The normal anterior-to-posterior frequency-amplitude gradient is absent.





Photic stimulation does not elicit any definite driving responses or 


epileptiform discharges.





Hyperventilation was not performed.





There is a Right Temporal onset electrographic seizure with secondary 


generalization toward the culmination of the record.











IMPRESSION:


Abnormal electroencephalogram due to: severe diffuse slowing with generalized 


periodic epileptiform discharges, and a right temporal onset electrographic 


seizure with secondary generalization.





COMMENT:


The slowing of the background with generalized periodic epileptiform discharges 


indicates severe, diffuse cortical dysfunction of nonspecific etiology.





A right temporal onset electrographic seizure with secondary generalization 


indicates an epileptogenic focus in that region.











JOSE FRANCOIS                  Feb 5, 2019 07:10





                                                                                


<Electronically signed by JOSE FRANCOIS MD>





Medications


Medication





Current Medications


IV Flush (NS 3 ml) 3 ml PER PROTOCOL IV ;  Start 2/4/19 at 03:30


Ondansetron HCl (Zofran Inj) 4 mg Q6H  PRN IV NAUSEA/VOMITING;  Start 2/4/19 at 


03:30


Acetaminophen (Tylenol Tab) 650 mg Q6H  PRN PO .PAIN 1-3 OR TEMP;  Start 2/4/19 


at 03:30


Acetaminophen/ Hydrocodone Bitart (Norco (5/325)) 1 tab Q6H  PRN PO .MOD PAIN 4-


6;  Start 2/4/19 at 03:30


Morphine Sulfate (morphine SULFATE (PF)) 2 mg Q4H  PRN IV .SEVERE PAIN 7-10;  


Start 2/4/19 at 03:30


Docusate Sodium (Colace) 100 mg Q12H  PRN PO .CONSTIPATION;  Start 2/4/19 at 


03:30


Magnesium Hydroxide (Milk Of Mag) 30 ml DAILY  PRN PO .CONSTIPATION;  Start 


2/4/19 at 03:30


Pantoprazole (Protonix Iv) 40 mg DAILY@06 IV  Last administered on 2/5/19at 


06:43; Admin Dose 40 MG;  Start 2/4/19 at 06:00


Heparin Sodium (Porcine) (Heparin  (5000 Units/1ml)) 5,000 unit Q12 SC  Last 


administered on 2/4/19at 22:22; Admin Dose 5,000 UNIT;  Start 2/4/19 at 09:00


Sodium Chloride 1,000 ml @  75 mls/hr [id number] IV  Last administered on 2/5/19at 


06:43; Admin Dose 75 MLS/HR;  Start 2/4/19 at 03:12


Albuterol/ Ipratropium (Duoneb) 3 ml Q4H RESP THERAPY  PRN HHN SHORTNESS OF 


BREATH;  Start 2/4/19 at 03:30


Hydralazine HCl (Apresoline) 10 mg Q6H  PRN IV ELEVATED BLOOD PRESSURE;  Start 


2/4/19 at 03:30


Ceftriaxone Sodium 50 ml @  100 mls/hr Q24H IVPB  Last administered on 2/5/19at 


04:12; Admin Dose 100 MLS/HR;  Start 2/4/19 at 03:30


Nitroglycerin (Nitroglycerin (Sl Tab) 0.4 Mg) 1 tab Q5M  PRN SL ANGINA;  Start 


2/4/19 at 03:30


Lorazepam (Ativan) 1 mg Q2H  PRN IV SEIZURES Last administered on 2/4/19at 


03:57; Admin Dose 1 MG;  Start 2/4/19 at 03:30


Miscellaneous Information (Pending Santyl Order For Wound Care) This patient 


ha... PRN  PRN XX WOUND CARE;  Start 2/4/19 at 05:00


Levetiracetam 100 ml @  400 mls/hr Q12 IVPB  Last administered on 2/5/19at 


08:57; Admin Dose 400 MLS/HR;  Start 2/5/19 at 07:30











TEJAS SALMERON                 Feb 5, 2019 09:18

## 2019-02-05 NOTE — EEG
EEG NOTE


Report Details


DATE OF TEST: 2/4/19





HISTORY:


The patient is a 58-year-old F who presents with altered mental status and 


seizure.





This EEG is requested to rule out nonconvulsive status epilepticus.





SEDATION:


None.





CONDITIONS OF RECORDING:


This EEG was recorded digitally on the [employer] machine, using the 


International 10-20 System of electrodes plus anterior temporals and Nz.





STATES SAMPLED:


Lethargic.








FINDINGS:


The background is somewhat discontinuous, though grossly 


symmetric...predominated by polymorphic delta activity..





There are generalized periodic epileptiform discharges throughout.





A posterior dominant rhythm is absent.





The normal anterior-to-posterior frequency-amplitude gradient is absent.





Photic stimulation does not elicit any definite driving responses or 


epileptiform discharges.





Hyperventilation was not performed.





There is a Right Temporal onset electrographic seizure with secondary 


generalization toward the culmination of the record.











IMPRESSION:


Abnormal electroencephalogram due to: severe diffuse slowing with generalized 


periodic epileptiform discharges, and a right temporal onset electrographic 


seizure with secondary generalization.





COMMENT:


The slowing of the background with generalized periodic epileptiform discharges 


indicates severe, diffuse cortical dysfunction of nonspecific etiology.





A right temporal onset electrographic seizure with secondary generalization 


indicates an epileptogenic focus in that region.











JOSE FRANCOIS                  Feb 5, 2019 07:10

## 2019-02-05 NOTE — CONS
Assessment/Plan


Assessment/Plan


Assessment/Plan (Daily)


Assessment recommendations;





1.  Patient with history of West Nile encephalitis and advanced encephalopathy 


admitted for possible seizure-like activity.  Started on Keppra.





Obtain chest x-ray to rule out aspiration pneumonia.  Continue current 


supportive care.  Further recommendations once chest x-ray is obtained.





Consultation Date/Type/Reason


Admit Date/Time


Feb 3, 2019 at 22:12


Date of Consultation:  Feb 5, 2019


Type of Consult


Pulmonary





Patient is a 58-year-old [place] lady who was admitted to the hospital with co


mplaints of what i is described as seizure activity.  Patient has advanced 


dementia and encephalopathy and was unable to give any history by herself 


whatsoever.  Patient however did appear comfortable in bed.  History was 


obtained from patient's  who was present in the room.  There is no hist


ory of any recent fever, vomiting.





Past medical history;





1.  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





Medications; reviewed.


Allergies; none.


Social history; noncontributory.


Family history; patient is , has supportive .


Occupational history; patient is on disability.


Review of system; unable to be obtained.


General exam; middle-aged woman, noncommunicative.  Currently in no distress.


Date/Time of Note


DATE: 2/5/19 


TIME: 10:30





Past Medical History


Home Meds


Reported Medications


Cyanocobalamin* (Vitamin B-12*) 50 Mcg Tablet, 50 MCG PO DAILY, TAB


   2/4/19


Amlodipine Besylate* (Amlodipine Besylate*) 2.5 Mg Tablet, 5 MG PO DAILY, #30 


TAB


   2/4/19


Risperidone* (Risperidone*) 0.5 Mg Tablet, 0.5 MG PO DAILY, TAB


   2/4/19


Lamotrigine* (Lamotrigine*) 25 Mg Tablet, 25 MG PO DAILY, TAB


   2/4/19


Benztropine Mesylate* (Benztropine Mesylate*) 2 Mg Tablet, 2 MG PO BID, TAB


   2/4/19


Medications





Current Medications


IV Flush (NS 3 ml) 3 ml PER PROTOCOL IV ;  Start 2/4/19 at 03:30


Ondansetron HCl (Zofran Inj) 4 mg Q6H  PRN IV NAUSEA/VOMITING;  Start 2/4/19 at 


03:30


Acetaminophen (Tylenol Tab) 650 mg Q6H  PRN PO .PAIN 1-3 OR TEMP;  Start 2/4/19 


at 03:30


Acetaminophen/ Hydrocodone Bitart (Norco (5/325)) 1 tab Q6H  PRN PO .MOD PAIN 4-


6;  Start 2/4/19 at 03:30


Morphine Sulfate (morphine SULFATE (PF)) 2 mg Q4H  PRN IV .SEVERE PAIN 7-10;  


Start 2/4/19 at 03:30


Docusate Sodium (Colace) 100 mg Q12H  PRN PO .CONSTIPATION;  Start 2/4/19 at 


03:30


Magnesium Hydroxide (Milk Of Mag) 30 ml DAILY  PRN PO .CONSTIPATION;  Start 


2/4/19 at 03:30


Pantoprazole (Protonix Iv) 40 mg DAILY@06 IV  Last administered on 2/5/19at 


06:43; Admin Dose 40 MG;  Start 2/4/19 at 06:00


Heparin Sodium (Porcine) (Heparin  (5000 Units/1ml)) 5,000 unit Q12 SC  Last 


administered on 2/4/19at 22:22; Admin Dose 5,000 UNIT;  Start 2/4/19 at 09:00


Sodium Chloride 1,000 ml @  75 mls/hr [id number] IV  Last administered on 2/5/19at 


06:43; Admin Dose 75 MLS/HR;  Start 2/4/19 at 03:12


Albuterol/ Ipratropium (Duoneb) 3 ml Q4H RESP THERAPY  PRN HHN SHORTNESS OF 


BREATH;  Start 2/4/19 at 03:30


Hydralazine HCl (Apresoline) 10 mg Q6H  PRN IV ELEVATED BLOOD PRESSURE;  Start 


2/4/19 at 03:30


Ceftriaxone Sodium 50 ml @  100 mls/hr Q24H IVPB  Last administered on 2/5/19at 


04:12; Admin Dose 100 MLS/HR;  Start 2/4/19 at 03:30


Nitroglycerin (Nitroglycerin (Sl Tab) 0.4 Mg) 1 tab Q5M  PRN SL ANGINA;  Start 


2/4/19 at 03:30


Lorazepam (Ativan) 1 mg Q2H  PRN IV SEIZURES Last administered on 2/4/19at 


03:57; Admin Dose 1 MG;  Start 2/4/19 at 03:30


Miscellaneous Information (Pending Santyl Order For Wound Care) This patient 


ha... PRN  PRN XX WOUND CARE;  Start 2/4/19 at 05:00


Levetiracetam 100 ml @  400 mls/hr Q12 IVPB  Last administered on 2/5/19at 


08:57; Admin Dose 400 MLS/HR;  Start 2/5/19 at 07:30


Allergies:  


Coded Allergies:  


     No Known Allergy (Unverified , 2/4/19)





Social History


Smoking Status:  Never smoker





Exam/Review of Systems


Exam


Vitals





Vital Signs


  Date      Temp  Pulse  Resp  B/P (MAP)   Pulse Ox  O2          O2 Flow    FiO2


Time                                                 Delivery    Rate


    2/5/19           71


     08:57


    2/5/19  97.3           18      119/73        99


     07:25                           (88)


    2/4/19                                           Nasal


     15:11                                           Cannula








Intake and Output





2/4/19 2/4/19 2/5/19





1515:00


23:00


07:00





IntakeIntake Total


0 ml


750 ml





OutputOutput Total


250 ml





BalanceBalance


-250 ml


750 ml











Exam


H EENT exam; supple neck, no JVD.  No lymphadenopathy.  Midline trachea.  No 


thyromegaly.  Patient has fair dentition.  Pupils are small bilaterally.  


Patient has fair dentition.


Chest exam; diminished but clear breath sounds.  S1-S2 audible, no murmurs.  Re


gular rhythm.


Abdomen exam; soft, nondistended.  No organomegaly.  Bowel sounds audible.


Extremity exam; peripheral edema.


CNS exam; patient remains totally noncommunicative.





Results


Result Diagram:  


2/5/19 0659                                                                     


          2/5/19 0658





Results 24hrs





Laboratory Tests


 Test
                                2/4/19
14:15  2/5/19
06:58  2/5/19
06:59


 Urine Color                          YELLOW


 Urine Clarity                        CLOUDY  A


 Urine pH                                    7.0


 Urine Specific Gravity                    1.009


 Urine Ketones                               1+  H


 Urine Nitrite                        POSITIVE  A


 Urine Bilirubin                      NEGATIVE


 Urine Urobilinogen                   NEGATIVE


 Urine Leukocyte Esterase                    3+  H


 Urine Microscopic RBC                         1


 Urine Microscopic WBC                       17  H


 Urine Bacteria                       FEW  A


 Urine Mucus                          FEW  A


 Urine Yeast (Budding)                FEW  A


 Urine Hemoglobin                            1+  H


 Urine Glucose                        NEGATIVE


 Urine Total Protein                  NEGATIVE


 Sodium Level                                             131  L


 Potassium Level                                           4.4


 Chloride Level                                            105


 Carbon Dioxide Level                                      19  L


 Anion Gap                                                   7


 Blood Urea Nitrogen                                        3  L


 Creatinine                                              0.27  L


 Est Glomerular Filtrat Rate
mL/min   
             > 60  
       



 Glucose Level                                              74


 Calcium Level                                             8.8


 Phosphorus Level                                          4.1


 Magnesium Level                                           1.7


 Total Bilirubin                                          0.1  L


 Direct Bilirubin                                         0.00


 Indirect Bilirubin                                        0.1


 Aspartate Amino Transf
(AST/SGOT)    
                    26  
  



 Alanine Aminotransferase
(ALT/SGPT)  
                    19  
  



 Alkaline Phosphatase                               < 20  L


 Total Protein                                            5.8  L


 Albumin                                                  3.2  L


 Triglycerides Level                                        60


 Cholesterol Level                                         173


 LDL Cholesterol, Calculated                               116


 HDL Cholesterol                                            45


 Cholesterol/HDL Ratio                                     3.8


 Thyroid Stimulating Hormone
(TSH)    
                 1.560  
  



 White Blood Count                                                    11.2  #H


 Red Blood Count                                                       3.95  L


 Hemoglobin                                                             12.4


 Hematocrit                                                            35.8  L


 Mean Corpuscular Volume                                                90.6


 Mean Corpuscular Hemoglobin                                            31.4


 Mean Corpuscular Hemoglobin
Concent  
             
                  34.6  



 Red Cell Distribution Width                                            11.9


 Platelet Count                                                          270


 Mean Platelet Volume                                                    9.5


 Immature Granulocytes %                                               0.200


 Neutrophils %                                                         82.9  H


 Lymphocytes %                                                          7.3  L


 Monocytes %                                                             7.8


 Eosinophils %                                                           1.3


 Basophils %                                                             0.5


 Nucleated Red Blood Cells %                                             0.0


 Immature Granulocytes #                                               0.020


 Neutrophils #                                                          9.3  H


 Lymphocytes #                                                           0.8


 Monocytes #                                                             0.9


 Eosinophils #                                                           0.1


 Basophils #                                                             0.1


 Nucleated Red Blood Cells #                                             0.0








Medications


Medication





Current Medications


IV Flush (NS 3 ml) 3 ml PER PROTOCOL IV ;  Start 2/4/19 at 03:30


Ondansetron HCl (Zofran Inj) 4 mg Q6H  PRN IV NAUSEA/VOMITING;  Start 2/4/19 at 


03:30


Acetaminophen (Tylenol Tab) 650 mg Q6H  PRN PO .PAIN 1-3 OR TEMP;  Start 2/4/19 


at 03:30


Acetaminophen/ Hydrocodone Bitart (Norco (5/325)) 1 tab Q6H  PRN PO .MOD PAIN 4-


6;  Start 2/4/19 at 03:30


Morphine Sulfate (morphine SULFATE (PF)) 2 mg Q4H  PRN IV .SEVERE PAIN 7-10;  


Start 2/4/19 at 03:30


Docusate Sodium (Colace) 100 mg Q12H  PRN PO .CONSTIPATION;  Start 2/4/19 at 


03:30


Magnesium Hydroxide (Milk Of Mag) 30 ml DAILY  PRN PO .CONSTIPATION;  Start 


2/4/19 at 03:30


Pantoprazole (Protonix Iv) 40 mg DAILY@06 IV  Last administered on 2/5/19at 


06:43; Admin Dose 40 MG;  Start 2/4/19 at 06:00


Heparin Sodium (Porcine) (Heparin  (5000 Units/1ml)) 5,000 unit Q12 SC  Last 


administered on 2/4/19at 22:22; Admin Dose 5,000 UNIT;  Start 2/4/19 at 09:00


Sodium Chloride 1,000 ml @  75 mls/hr [id number] IV  Last administered on 2/5/19at 


06:43; Admin Dose 75 MLS/HR;  Start 2/4/19 at 03:12


Albuterol/ Ipratropium (Duoneb) 3 ml Q4H RESP THERAPY  PRN HHN SHORTNESS OF 


BREATH;  Start 2/4/19 at 03:30


Hydralazine HCl (Apresoline) 10 mg Q6H  PRN IV ELEVATED BLOOD PRESSURE;  Start 


2/4/19 at 03:30


Ceftriaxone Sodium 50 ml @  100 mls/hr Q24H IVPB  Last administered on 2/5/19at 


04:12; Admin Dose 100 MLS/HR;  Start 2/4/19 at 03:30


Nitroglycerin (Nitroglycerin (Sl Tab) 0.4 Mg) 1 tab Q5M  PRN SL ANGINA;  Start 


2/4/19 at 03:30


Lorazepam (Ativan) 1 mg Q2H  PRN IV SEIZURES Last administered on 2/4/19at 


03:57; Admin Dose 1 MG;  Start 2/4/19 at 03:30


Miscellaneous Information (Pending Santyl Order For Wound Care) This patient 


ha... PRN  PRN XX WOUND CARE;  Start 2/4/19 at 05:00


Levetiracetam 100 ml @  400 mls/hr Q12 IVPB  Last administered on 2/5/19at 


08:57; Admin Dose 400 MLS/HR;  Start 2/5/19 at 07:30











PATRICIA CHAUHAN                     Feb 5, 2019 10:32

## 2019-02-05 NOTE — CONS
Assessment/Plan


Assessment/Plan


Hospital Course


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 


p/w ams.


She was additionally reported to have a seizure... for which neurology is co


nsulted.





Most clinically consistent with new onset epilepsy.





EEG is notable for GPEDs and an electrographic seizure originating in the R 


temporal region..


Now s/p Dilantin load to goal ~25, with ongoing episodic staring spells 


(presumed seizures) noted clinically..








P:


Await MRI brain with and without contrast for further characterization


Transfer to ICU, then load w/ phenobarbital 800mg iv (divided in 4 doses)


Repeat dilantin level in am; start maintenance when level <20


OK to continue Keppra as ordered for now


Airway and other medical management per primary


Will follow clinically





Consultation Date/Type/Reason


Admit Date/Time


Feb 3, 2019 at 22:12


Type of Consult


Neurology


Reason for Consultation


new onset seizures


Requesting Provider:  LAURENCE CASTELLANO


Date/Time of Note


DATE: 2/5/19 


TIME: 15:48





24 HR Interval Summary


Free Text/Dictation


Continues telemetry monitoring. S/p EEG, MRI.  at bedside states that the


pt has been out of it today, "staring up at the ceiling and not looking at me." 


Stated that this has happened several times today as well as in the past.


Subjective hx not possible:  pt non-verbal





Exam


Vital Signs


Vitals





Vital Signs


  Date      Temp  Pulse  Resp  B/P (MAP)   Pulse Ox  O2          O2 Flow    FiO2


Time                                                 Delivery    Rate


    2/5/19           74


     12:29


    2/5/19  98.4           15      113/59        95


     11:08                           (77)


    2/5/19                                           Nasal             2.0


     09:01                                           Cannula








Intake and Output





2/4/19 2/4/19 2/5/19





1515:00


23:00


07:00





IntakeIntake Total


0 ml


750 ml





OutputOutput Total


250 ml





BalanceBalance


-250 ml


750 ml














Exam


PE:


Gen Appearance: No Apparent Distress


HEENT: Normocephalic


Cardiovascular: Regular rate


Abdomen: Soft


Extremities: Dry





NE:


The patient was awake, though unresponsive. Does not blink to confrontation. Is 


nonverbal.   





Cranial nerve examination was limited by mental status. Pupils were equal and 


reactive to light. There was no afferent pupillary defect. Funduscopic exa


mination was limited. Face was grossly symmetric, w/ present corneal and cough 


reflexes.





Tone was spastic in BUE. Muscle bulk was diminished. I did not see 


fasciculations. The patient minimally withdrew to noxious stimulation x 4.





Coordination and gait testing was limited by mental status.





Leg reflexes were brisk and symmetric. Richmond's sign was absent. Plantar 


responses were flexor.











JULIETA SINGH NP           Feb 5, 2019 15:48


JOSE FRANCOIS                  Feb 5, 2019 17:57

## 2019-02-05 NOTE — QN
Documentation


Comment


58-year-old woman I was called to the ICU to intubate.  She has a history of 


chronic encephalopathy and bedbound state and was initially on a DO NOT 


RESUSCITATE status per family reversed the DNI portion and wanted intubation if 


needed.





Endotracheal Intubation by me: 


Pre assessment performed.  Etomidate 20 mg IV x1 and succinylcholine 50 mg IV x1


was given for sedation and paralysis


Pre-oxygenation performed with 100% oxygen


   RSI:      Performed w/o complication or hypoxic events. Medications as 


ordered above


   Blade:      4.0 Khanh


   ET Tube:   7.5 cm


   Depth:      23 cm at the lip





Intubation confirmed by colorimetric CO2, equal breath sounds, quiet over the 


stomach.  Given the size of the patient and I do suspect the tube is at or near 


the arvind although I will wait for chest x-ray to determine how far to retract 


the tube.  Postintubation the patient's oxygen saturation is 100%





Chest X-ray 1V Interpreted by me:


ET tube was lodged in the right mainstem bronchus.  No pneumothorax, no air 


under the diaphragm.





ET tube was retracted about 5 cm.











REBECCA PIERCE MD              Feb 5, 2019 19:40

## 2019-02-06 VITALS — RESPIRATION RATE: 14 BRPM | HEART RATE: 83 BPM | DIASTOLIC BLOOD PRESSURE: 70 MMHG | SYSTOLIC BLOOD PRESSURE: 96 MMHG

## 2019-02-06 VITALS — DIASTOLIC BLOOD PRESSURE: 68 MMHG | SYSTOLIC BLOOD PRESSURE: 93 MMHG | HEART RATE: 61 BPM | RESPIRATION RATE: 14 BRPM

## 2019-02-06 VITALS — HEART RATE: 80 BPM | RESPIRATION RATE: 14 BRPM | DIASTOLIC BLOOD PRESSURE: 64 MMHG | SYSTOLIC BLOOD PRESSURE: 92 MMHG

## 2019-02-06 VITALS — HEART RATE: 73 BPM | RESPIRATION RATE: 14 BRPM | DIASTOLIC BLOOD PRESSURE: 70 MMHG | SYSTOLIC BLOOD PRESSURE: 98 MMHG

## 2019-02-06 VITALS — DIASTOLIC BLOOD PRESSURE: 69 MMHG | HEART RATE: 72 BPM | RESPIRATION RATE: 14 BRPM | SYSTOLIC BLOOD PRESSURE: 91 MMHG

## 2019-02-06 VITALS — HEART RATE: 60 BPM | SYSTOLIC BLOOD PRESSURE: 122 MMHG | DIASTOLIC BLOOD PRESSURE: 82 MMHG | RESPIRATION RATE: 14 BRPM

## 2019-02-06 VITALS — RESPIRATION RATE: 14 BRPM | SYSTOLIC BLOOD PRESSURE: 88 MMHG | HEART RATE: 61 BPM | DIASTOLIC BLOOD PRESSURE: 64 MMHG

## 2019-02-06 VITALS — HEART RATE: 65 BPM | DIASTOLIC BLOOD PRESSURE: 76 MMHG | SYSTOLIC BLOOD PRESSURE: 99 MMHG | RESPIRATION RATE: 14 BRPM

## 2019-02-06 VITALS — HEART RATE: 89 BPM | SYSTOLIC BLOOD PRESSURE: 93 MMHG | RESPIRATION RATE: 14 BRPM | DIASTOLIC BLOOD PRESSURE: 71 MMHG

## 2019-02-06 VITALS — DIASTOLIC BLOOD PRESSURE: 92 MMHG | SYSTOLIC BLOOD PRESSURE: 134 MMHG | RESPIRATION RATE: 14 BRPM | HEART RATE: 77 BPM

## 2019-02-06 VITALS — RESPIRATION RATE: 13 BRPM | DIASTOLIC BLOOD PRESSURE: 85 MMHG | HEART RATE: 77 BPM | SYSTOLIC BLOOD PRESSURE: 123 MMHG

## 2019-02-06 VITALS — SYSTOLIC BLOOD PRESSURE: 97 MMHG | HEART RATE: 76 BPM | RESPIRATION RATE: 14 BRPM | DIASTOLIC BLOOD PRESSURE: 70 MMHG

## 2019-02-06 VITALS — RESPIRATION RATE: 14 BRPM

## 2019-02-06 VITALS — HEART RATE: 72 BPM | DIASTOLIC BLOOD PRESSURE: 67 MMHG | RESPIRATION RATE: 14 BRPM | SYSTOLIC BLOOD PRESSURE: 86 MMHG

## 2019-02-06 VITALS — RESPIRATION RATE: 14 BRPM | SYSTOLIC BLOOD PRESSURE: 121 MMHG | DIASTOLIC BLOOD PRESSURE: 80 MMHG | HEART RATE: 79 BPM

## 2019-02-06 VITALS — RESPIRATION RATE: 14 BRPM | DIASTOLIC BLOOD PRESSURE: 75 MMHG | SYSTOLIC BLOOD PRESSURE: 100 MMHG | HEART RATE: 78 BPM

## 2019-02-06 VITALS — HEART RATE: 85 BPM | SYSTOLIC BLOOD PRESSURE: 93 MMHG | DIASTOLIC BLOOD PRESSURE: 70 MMHG | RESPIRATION RATE: 12 BRPM

## 2019-02-06 VITALS — DIASTOLIC BLOOD PRESSURE: 72 MMHG | RESPIRATION RATE: 14 BRPM | HEART RATE: 85 BPM | SYSTOLIC BLOOD PRESSURE: 98 MMHG

## 2019-02-06 VITALS — RESPIRATION RATE: 13 BRPM | DIASTOLIC BLOOD PRESSURE: 68 MMHG | SYSTOLIC BLOOD PRESSURE: 94 MMHG | HEART RATE: 89 BPM

## 2019-02-06 VITALS — HEART RATE: 76 BPM | RESPIRATION RATE: 14 BRPM | SYSTOLIC BLOOD PRESSURE: 130 MMHG | DIASTOLIC BLOOD PRESSURE: 86 MMHG

## 2019-02-06 VITALS — DIASTOLIC BLOOD PRESSURE: 77 MMHG | RESPIRATION RATE: 14 BRPM | SYSTOLIC BLOOD PRESSURE: 104 MMHG | HEART RATE: 77 BPM

## 2019-02-06 VITALS — DIASTOLIC BLOOD PRESSURE: 82 MMHG | RESPIRATION RATE: 15 BRPM | HEART RATE: 79 BPM | SYSTOLIC BLOOD PRESSURE: 120 MMHG

## 2019-02-06 VITALS — SYSTOLIC BLOOD PRESSURE: 105 MMHG | RESPIRATION RATE: 16 BRPM | DIASTOLIC BLOOD PRESSURE: 70 MMHG | HEART RATE: 74 BPM

## 2019-02-06 VITALS — DIASTOLIC BLOOD PRESSURE: 82 MMHG | HEART RATE: 79 BPM | SYSTOLIC BLOOD PRESSURE: 112 MMHG | RESPIRATION RATE: 16 BRPM

## 2019-02-06 VITALS — DIASTOLIC BLOOD PRESSURE: 78 MMHG | SYSTOLIC BLOOD PRESSURE: 104 MMHG | HEART RATE: 75 BPM | RESPIRATION RATE: 14 BRPM

## 2019-02-06 VITALS — HEART RATE: 82 BPM | DIASTOLIC BLOOD PRESSURE: 51 MMHG | RESPIRATION RATE: 17 BRPM | SYSTOLIC BLOOD PRESSURE: 76 MMHG

## 2019-02-06 VITALS — DIASTOLIC BLOOD PRESSURE: 90 MMHG | HEART RATE: 75 BPM | SYSTOLIC BLOOD PRESSURE: 132 MMHG | RESPIRATION RATE: 14 BRPM

## 2019-02-06 VITALS — DIASTOLIC BLOOD PRESSURE: 80 MMHG | RESPIRATION RATE: 11 BRPM | HEART RATE: 75 BPM | SYSTOLIC BLOOD PRESSURE: 104 MMHG

## 2019-02-06 VITALS — SYSTOLIC BLOOD PRESSURE: 101 MMHG | RESPIRATION RATE: 14 BRPM | HEART RATE: 66 BPM | DIASTOLIC BLOOD PRESSURE: 77 MMHG

## 2019-02-06 VITALS — RESPIRATION RATE: 14 BRPM | DIASTOLIC BLOOD PRESSURE: 66 MMHG | SYSTOLIC BLOOD PRESSURE: 94 MMHG | HEART RATE: 73 BPM

## 2019-02-06 VITALS — HEART RATE: 73 BPM | RESPIRATION RATE: 14 BRPM | SYSTOLIC BLOOD PRESSURE: 105 MMHG | DIASTOLIC BLOOD PRESSURE: 75 MMHG

## 2019-02-06 VITALS — RESPIRATION RATE: 14 BRPM | DIASTOLIC BLOOD PRESSURE: 74 MMHG | HEART RATE: 82 BPM | SYSTOLIC BLOOD PRESSURE: 96 MMHG

## 2019-02-06 VITALS — SYSTOLIC BLOOD PRESSURE: 124 MMHG | DIASTOLIC BLOOD PRESSURE: 86 MMHG | RESPIRATION RATE: 14 BRPM | HEART RATE: 57 BPM

## 2019-02-06 VITALS — HEART RATE: 64 BPM | DIASTOLIC BLOOD PRESSURE: 103 MMHG | RESPIRATION RATE: 12 BRPM | SYSTOLIC BLOOD PRESSURE: 116 MMHG

## 2019-02-06 VITALS — HEART RATE: 75 BPM | SYSTOLIC BLOOD PRESSURE: 100 MMHG | DIASTOLIC BLOOD PRESSURE: 70 MMHG | RESPIRATION RATE: 14 BRPM

## 2019-02-06 VITALS — DIASTOLIC BLOOD PRESSURE: 86 MMHG | HEART RATE: 80 BPM | RESPIRATION RATE: 14 BRPM | SYSTOLIC BLOOD PRESSURE: 126 MMHG

## 2019-02-06 VITALS — SYSTOLIC BLOOD PRESSURE: 105 MMHG | DIASTOLIC BLOOD PRESSURE: 72 MMHG | RESPIRATION RATE: 14 BRPM | HEART RATE: 75 BPM

## 2019-02-06 VITALS — RESPIRATION RATE: 15 BRPM | HEART RATE: 77 BPM | SYSTOLIC BLOOD PRESSURE: 132 MMHG | DIASTOLIC BLOOD PRESSURE: 90 MMHG

## 2019-02-06 VITALS — DIASTOLIC BLOOD PRESSURE: 82 MMHG | RESPIRATION RATE: 14 BRPM | HEART RATE: 75 BPM | SYSTOLIC BLOOD PRESSURE: 112 MMHG

## 2019-02-06 VITALS — SYSTOLIC BLOOD PRESSURE: 91 MMHG | HEART RATE: 58 BPM | RESPIRATION RATE: 14 BRPM | DIASTOLIC BLOOD PRESSURE: 71 MMHG

## 2019-02-06 VITALS — RESPIRATION RATE: 14 BRPM | DIASTOLIC BLOOD PRESSURE: 82 MMHG | SYSTOLIC BLOOD PRESSURE: 121 MMHG | HEART RATE: 76 BPM

## 2019-02-06 VITALS — SYSTOLIC BLOOD PRESSURE: 108 MMHG | HEART RATE: 74 BPM | RESPIRATION RATE: 14 BRPM | DIASTOLIC BLOOD PRESSURE: 80 MMHG

## 2019-02-06 VITALS — HEART RATE: 80 BPM | SYSTOLIC BLOOD PRESSURE: 99 MMHG | RESPIRATION RATE: 14 BRPM | DIASTOLIC BLOOD PRESSURE: 77 MMHG

## 2019-02-06 VITALS — RESPIRATION RATE: 17 BRPM | HEART RATE: 72 BPM | DIASTOLIC BLOOD PRESSURE: 87 MMHG | SYSTOLIC BLOOD PRESSURE: 116 MMHG

## 2019-02-06 VITALS — RESPIRATION RATE: 12 BRPM | DIASTOLIC BLOOD PRESSURE: 61 MMHG | HEART RATE: 77 BPM | SYSTOLIC BLOOD PRESSURE: 83 MMHG

## 2019-02-06 VITALS — RESPIRATION RATE: 15 BRPM | SYSTOLIC BLOOD PRESSURE: 84 MMHG | DIASTOLIC BLOOD PRESSURE: 59 MMHG | HEART RATE: 79 BPM

## 2019-02-06 VITALS — SYSTOLIC BLOOD PRESSURE: 96 MMHG | RESPIRATION RATE: 14 BRPM | DIASTOLIC BLOOD PRESSURE: 70 MMHG | HEART RATE: 72 BPM

## 2019-02-06 VITALS — RESPIRATION RATE: 14 BRPM | HEART RATE: 57 BPM | DIASTOLIC BLOOD PRESSURE: 70 MMHG | SYSTOLIC BLOOD PRESSURE: 99 MMHG

## 2019-02-06 VITALS — RESPIRATION RATE: 16 BRPM | DIASTOLIC BLOOD PRESSURE: 83 MMHG | SYSTOLIC BLOOD PRESSURE: 115 MMHG | HEART RATE: 73 BPM

## 2019-02-06 VITALS — SYSTOLIC BLOOD PRESSURE: 116 MMHG | RESPIRATION RATE: 14 BRPM | DIASTOLIC BLOOD PRESSURE: 86 MMHG | HEART RATE: 73 BPM

## 2019-02-06 VITALS — SYSTOLIC BLOOD PRESSURE: 94 MMHG | DIASTOLIC BLOOD PRESSURE: 71 MMHG | RESPIRATION RATE: 14 BRPM | HEART RATE: 88 BPM

## 2019-02-06 VITALS — SYSTOLIC BLOOD PRESSURE: 94 MMHG | RESPIRATION RATE: 14 BRPM | DIASTOLIC BLOOD PRESSURE: 68 MMHG | HEART RATE: 80 BPM

## 2019-02-06 VITALS — DIASTOLIC BLOOD PRESSURE: 84 MMHG | HEART RATE: 75 BPM | RESPIRATION RATE: 14 BRPM | SYSTOLIC BLOOD PRESSURE: 111 MMHG

## 2019-02-06 VITALS — DIASTOLIC BLOOD PRESSURE: 76 MMHG | HEART RATE: 78 BPM | RESPIRATION RATE: 14 BRPM | SYSTOLIC BLOOD PRESSURE: 107 MMHG

## 2019-02-06 VITALS — RESPIRATION RATE: 14 BRPM | SYSTOLIC BLOOD PRESSURE: 102 MMHG | HEART RATE: 77 BPM | DIASTOLIC BLOOD PRESSURE: 79 MMHG

## 2019-02-06 VITALS — DIASTOLIC BLOOD PRESSURE: 73 MMHG | HEART RATE: 74 BPM | SYSTOLIC BLOOD PRESSURE: 93 MMHG | RESPIRATION RATE: 14 BRPM

## 2019-02-06 VITALS — DIASTOLIC BLOOD PRESSURE: 73 MMHG | HEART RATE: 72 BPM | SYSTOLIC BLOOD PRESSURE: 104 MMHG | RESPIRATION RATE: 14 BRPM

## 2019-02-06 VITALS — DIASTOLIC BLOOD PRESSURE: 66 MMHG | RESPIRATION RATE: 14 BRPM | SYSTOLIC BLOOD PRESSURE: 87 MMHG | HEART RATE: 74 BPM

## 2019-02-06 VITALS — HEART RATE: 80 BPM | SYSTOLIC BLOOD PRESSURE: 97 MMHG | RESPIRATION RATE: 14 BRPM | DIASTOLIC BLOOD PRESSURE: 74 MMHG

## 2019-02-06 VITALS — HEART RATE: 87 BPM | SYSTOLIC BLOOD PRESSURE: 92 MMHG | DIASTOLIC BLOOD PRESSURE: 71 MMHG | RESPIRATION RATE: 12 BRPM

## 2019-02-06 VITALS — RESPIRATION RATE: 14 BRPM | HEART RATE: 69 BPM | DIASTOLIC BLOOD PRESSURE: 68 MMHG | SYSTOLIC BLOOD PRESSURE: 95 MMHG

## 2019-02-06 VITALS — DIASTOLIC BLOOD PRESSURE: 79 MMHG | RESPIRATION RATE: 14 BRPM | SYSTOLIC BLOOD PRESSURE: 111 MMHG | HEART RATE: 77 BPM

## 2019-02-06 VITALS — HEART RATE: 76 BPM | SYSTOLIC BLOOD PRESSURE: 99 MMHG | RESPIRATION RATE: 14 BRPM | DIASTOLIC BLOOD PRESSURE: 67 MMHG

## 2019-02-06 VITALS — HEART RATE: 88 BPM | RESPIRATION RATE: 14 BRPM | SYSTOLIC BLOOD PRESSURE: 92 MMHG | DIASTOLIC BLOOD PRESSURE: 68 MMHG

## 2019-02-06 VITALS — HEART RATE: 68 BPM | SYSTOLIC BLOOD PRESSURE: 99 MMHG | DIASTOLIC BLOOD PRESSURE: 77 MMHG | RESPIRATION RATE: 14 BRPM

## 2019-02-06 VITALS — DIASTOLIC BLOOD PRESSURE: 72 MMHG | SYSTOLIC BLOOD PRESSURE: 91 MMHG | RESPIRATION RATE: 14 BRPM | HEART RATE: 73 BPM

## 2019-02-06 VITALS — RESPIRATION RATE: 16 BRPM | HEART RATE: 74 BPM | DIASTOLIC BLOOD PRESSURE: 79 MMHG | SYSTOLIC BLOOD PRESSURE: 119 MMHG

## 2019-02-06 VITALS — RESPIRATION RATE: 14 BRPM | DIASTOLIC BLOOD PRESSURE: 71 MMHG | HEART RATE: 89 BPM | SYSTOLIC BLOOD PRESSURE: 94 MMHG

## 2019-02-06 VITALS — DIASTOLIC BLOOD PRESSURE: 65 MMHG | RESPIRATION RATE: 14 BRPM | HEART RATE: 59 BPM | SYSTOLIC BLOOD PRESSURE: 90 MMHG

## 2019-02-06 VITALS — DIASTOLIC BLOOD PRESSURE: 70 MMHG | RESPIRATION RATE: 14 BRPM | SYSTOLIC BLOOD PRESSURE: 92 MMHG | HEART RATE: 70 BPM

## 2019-02-06 VITALS — SYSTOLIC BLOOD PRESSURE: 114 MMHG | HEART RATE: 60 BPM | DIASTOLIC BLOOD PRESSURE: 84 MMHG | RESPIRATION RATE: 14 BRPM

## 2019-02-06 VITALS — DIASTOLIC BLOOD PRESSURE: 71 MMHG | SYSTOLIC BLOOD PRESSURE: 101 MMHG | HEART RATE: 72 BPM | RESPIRATION RATE: 12 BRPM

## 2019-02-06 VITALS — SYSTOLIC BLOOD PRESSURE: 112 MMHG | HEART RATE: 73 BPM | DIASTOLIC BLOOD PRESSURE: 85 MMHG | RESPIRATION RATE: 12 BRPM

## 2019-02-06 VITALS — DIASTOLIC BLOOD PRESSURE: 72 MMHG | HEART RATE: 90 BPM | SYSTOLIC BLOOD PRESSURE: 91 MMHG | RESPIRATION RATE: 14 BRPM

## 2019-02-06 VITALS — SYSTOLIC BLOOD PRESSURE: 106 MMHG | RESPIRATION RATE: 16 BRPM | HEART RATE: 76 BPM | DIASTOLIC BLOOD PRESSURE: 70 MMHG

## 2019-02-06 VITALS — HEART RATE: 75 BPM | RESPIRATION RATE: 12 BRPM | SYSTOLIC BLOOD PRESSURE: 104 MMHG | DIASTOLIC BLOOD PRESSURE: 78 MMHG

## 2019-02-06 VITALS — SYSTOLIC BLOOD PRESSURE: 115 MMHG | RESPIRATION RATE: 22 BRPM | HEART RATE: 75 BPM | DIASTOLIC BLOOD PRESSURE: 75 MMHG

## 2019-02-06 VITALS — SYSTOLIC BLOOD PRESSURE: 97 MMHG | RESPIRATION RATE: 14 BRPM | HEART RATE: 84 BPM | DIASTOLIC BLOOD PRESSURE: 73 MMHG

## 2019-02-06 VITALS — RESPIRATION RATE: 14 BRPM | HEART RATE: 74 BPM | DIASTOLIC BLOOD PRESSURE: 67 MMHG | SYSTOLIC BLOOD PRESSURE: 86 MMHG

## 2019-02-06 VITALS — SYSTOLIC BLOOD PRESSURE: 108 MMHG | DIASTOLIC BLOOD PRESSURE: 77 MMHG | HEART RATE: 62 BPM | RESPIRATION RATE: 14 BRPM

## 2019-02-06 VITALS — RESPIRATION RATE: 14 BRPM | SYSTOLIC BLOOD PRESSURE: 114 MMHG | HEART RATE: 61 BPM | DIASTOLIC BLOOD PRESSURE: 80 MMHG

## 2019-02-06 VITALS — RESPIRATION RATE: 15 BRPM | DIASTOLIC BLOOD PRESSURE: 114 MMHG | HEART RATE: 76 BPM | SYSTOLIC BLOOD PRESSURE: 128 MMHG

## 2019-02-06 VITALS — HEART RATE: 60 BPM | DIASTOLIC BLOOD PRESSURE: 82 MMHG | SYSTOLIC BLOOD PRESSURE: 123 MMHG | RESPIRATION RATE: 14 BRPM

## 2019-02-06 VITALS — SYSTOLIC BLOOD PRESSURE: 89 MMHG | HEART RATE: 70 BPM | DIASTOLIC BLOOD PRESSURE: 65 MMHG | RESPIRATION RATE: 14 BRPM

## 2019-02-06 VITALS — SYSTOLIC BLOOD PRESSURE: 96 MMHG | HEART RATE: 74 BPM | RESPIRATION RATE: 14 BRPM | DIASTOLIC BLOOD PRESSURE: 68 MMHG

## 2019-02-06 VITALS — SYSTOLIC BLOOD PRESSURE: 117 MMHG | RESPIRATION RATE: 14 BRPM | HEART RATE: 60 BPM | DIASTOLIC BLOOD PRESSURE: 79 MMHG

## 2019-02-06 VITALS — SYSTOLIC BLOOD PRESSURE: 76 MMHG | RESPIRATION RATE: 14 BRPM | DIASTOLIC BLOOD PRESSURE: 59 MMHG | HEART RATE: 65 BPM

## 2019-02-06 VITALS — RESPIRATION RATE: 14 BRPM | DIASTOLIC BLOOD PRESSURE: 88 MMHG | SYSTOLIC BLOOD PRESSURE: 114 MMHG | HEART RATE: 74 BPM

## 2019-02-06 VITALS — HEART RATE: 65 BPM | DIASTOLIC BLOOD PRESSURE: 70 MMHG | SYSTOLIC BLOOD PRESSURE: 98 MMHG | RESPIRATION RATE: 14 BRPM

## 2019-02-06 VITALS — RESPIRATION RATE: 12 BRPM | HEART RATE: 78 BPM | SYSTOLIC BLOOD PRESSURE: 185 MMHG | DIASTOLIC BLOOD PRESSURE: 123 MMHG

## 2019-02-06 VITALS — RESPIRATION RATE: 8 BRPM | SYSTOLIC BLOOD PRESSURE: 90 MMHG | DIASTOLIC BLOOD PRESSURE: 68 MMHG | HEART RATE: 86 BPM

## 2019-02-06 VITALS — HEART RATE: 74 BPM | DIASTOLIC BLOOD PRESSURE: 80 MMHG | RESPIRATION RATE: 15 BRPM | SYSTOLIC BLOOD PRESSURE: 115 MMHG

## 2019-02-06 VITALS — SYSTOLIC BLOOD PRESSURE: 117 MMHG | RESPIRATION RATE: 14 BRPM | HEART RATE: 60 BPM | DIASTOLIC BLOOD PRESSURE: 81 MMHG

## 2019-02-06 VITALS — DIASTOLIC BLOOD PRESSURE: 87 MMHG | RESPIRATION RATE: 14 BRPM | SYSTOLIC BLOOD PRESSURE: 123 MMHG | HEART RATE: 71 BPM

## 2019-02-06 LAB
ADD MAN DIFF?: NO
ADD UMIC: YES
ALLEN TEST: (no result)
ANION GAP: 5 (ref 5–13)
ARTERIAL BASE EXCESS: -6.7 MMOL/L (ref -3–3)
ARTERIAL BLOOD GAS OXYGEN SAT: 98.8 MMHG (ref 95–98)
ARTERIAL COHB: 0.3 % (ref 0–3)
ARTERIAL FRACTION OF OXYHGB: 98.3 % (ref 93–99)
ARTERIAL HCO3: 16.8 MMOL/L (ref 22–26)
ARTERIAL METHB: 0.2 % (ref 0–1.5)
ARTERIAL PCO2: 27.8 MMHG (ref 35–45)
BASOPHIL #: 0 10^3/UL (ref 0–0.1)
BASOPHILS %: 0.3 % (ref 0–2)
BLOOD GAS LOW PEEP SETTING: 5 CMH2O
BLOOD GAS TIDAL VOLUME: 400 ML
BLOOD UREA NITROGEN: 3 MG/DL (ref 7–20)
CALCIUM: 8.3 MG/DL (ref 8.4–10.2)
CARBON DIOXIDE: 17 MMOL/L (ref 21–31)
CHLORIDE: 111 MMOL/L (ref 97–110)
CREATININE: 0.28 MG/DL (ref 0.44–1)
EOSINOPHILS #: 0.1 10^3/UL (ref 0–0.5)
EOSINOPHILS %: 0.6 % (ref 0–7)
FIO2: 40 %
GLUCOSE: 201 MG/DL (ref 70–220)
HEMATOCRIT: 32.5 % (ref 37–47)
HEMOGLOBIN: 11.7 G/DL (ref 12–16)
IMMATURE GRANS #M: 0.04 10^3/UL (ref 0–0.03)
IMMATURE GRANS % (M): 0.3 % (ref 0–0.43)
LACTIC ACID: 1.5 MMOL/L (ref 0.5–2)
LACTIC ACID: 1.6 MMOL/L (ref 0.5–2)
LYMPHOCYTES #: 1.3 10^3/UL (ref 0.8–2.9)
LYMPHOCYTES %: 10.9 % (ref 15–51)
MEAN CORPUSCULAR HEMOGLOBIN: 31.6 PG (ref 29–33)
MEAN CORPUSCULAR HGB CONC: 36 G/DL (ref 32–37)
MEAN CORPUSCULAR VOLUME: 87.8 FL (ref 82–101)
MEAN PLATELET VOLUME: 8.9 FL (ref 7.4–10.4)
MODE: (no result)
MONOCYTE #: 0.7 10^3/UL (ref 0.3–0.9)
MONOCYTES %: 6.2 % (ref 0–11)
NEUTROPHIL #: 9.8 10^3/UL (ref 1.6–7.5)
NEUTROPHILS %: 81.7 % (ref 39–77)
NUCLEATED RED BLOOD CELLS #: 0 10^3/UL (ref 0–0)
NUCLEATED RED BLOOD CELLS%: 0 /100WBC (ref 0–0)
O2 A-A PPRESDIFF RESPIRATORY: 38.9 MMHG (ref 7–24)
PHENYTOIN (DILANTIN): 19.2 UG/ML (ref 10–20)
PLATELET COUNT: 290 10^3/UL (ref 140–415)
POTASSIUM: 3.7 MMOL/L (ref 3.5–5.1)
RED BLOOD COUNT: 3.7 10^6/UL (ref 4.2–5.4)
RED CELL DISTRIBUTION WIDTH: 11.7 % (ref 11.5–14.5)
SODIUM: 133 MMOL/L (ref 135–144)
UR AMORPHOUS CRYSTAL: (no result) /HPF
UR ASCORBIC ACID: NEGATIVE MG/DL
UR BACTERIA: (no result) /HPF
UR BILIRUBIN (DIP): NEGATIVE MG/DL
UR BLOOD (DIP): (no result) MG/DL
UR CLARITY: (no result)
UR COLOR: (no result)
UR GLUCOSE (DIP): NEGATIVE MG/DL
UR KETONES (DIP): (no result) MG/DL
UR LEUKOCYTE ESTERASE (DIP): (no result) LEU/UL
UR NITRITE (DIP): NEGATIVE MG/DL
UR PH (DIP): 7 (ref 5–9)
UR RBC: 0 /HPF (ref 0–5)
UR SPECIFIC GRAVITY (DIP): 1.01 (ref 1–1.03)
UR TOTAL PROTEIN (DIP): (no result) MG/DL
UR UROBILINOGEN (DIP): NEGATIVE MG/DL
UR WBC: 0 /HPF (ref 0–5)
URINE PH (DIP): 7 (ref 5–9)
URINE RBCS: >50 /HPF
URINE SPECIFIC GRAVITY (DIP): 1.01 (ref 1–1.03)
WHITE BLOOD COUNT: 12 10^3/UL (ref 4.8–10.8)

## 2019-02-06 PROCEDURE — 02HV33Z INSERTION OF INFUSION DEVICE INTO SUPERIOR VENA CAVA, PERCUTANEOUS APPROACH: ICD-10-PCS

## 2019-02-06 RX ADMIN — HEPARIN SODIUM SCH UNIT: 5000 INJECTION, SOLUTION INTRAVENOUS; SUBCUTANEOUS at 21:19

## 2019-02-06 RX ADMIN — CEFTRIAXONE 1 MLS/HR: 1 INJECTION, SOLUTION INTRAVENOUS at 03:30

## 2019-02-06 RX ADMIN — Medication SCH MLS/HR: at 16:03

## 2019-02-06 RX ADMIN — HEPARIN SODIUM 1 UNIT: 5000 INJECTION, SOLUTION INTRAVENOUS; SUBCUTANEOUS at 09:58

## 2019-02-06 RX ADMIN — COLLAGENASE SANTYL 1 APPLIC: 250 OINTMENT TOPICAL at 14:52

## 2019-02-06 RX ADMIN — PROPOFOL SCH MLS/HR: 10 INJECTION, EMULSION INTRAVENOUS at 09:00

## 2019-02-06 RX ADMIN — PROPOFOL 1 MLS/HR: 10 INJECTION, EMULSION INTRAVENOUS at 21:00

## 2019-02-06 RX ADMIN — PANTOPRAZOLE SODIUM 1 MG: 40 INJECTION, POWDER, FOR SOLUTION INTRAVENOUS at 05:38

## 2019-02-06 RX ADMIN — PHENOBARBITAL SODIUM 1 MG: 65 INJECTION INTRAMUSCULAR; INTRAVENOUS at 16:16

## 2019-02-06 RX ADMIN — FLUCONAZOLE, SODIUM CHLORIDE 1 MLS/HR: 2 INJECTION INTRAVENOUS at 12:25

## 2019-02-06 RX ADMIN — CEFTRIAXONE SCH MLS/HR: 1 INJECTION, SOLUTION INTRAVENOUS at 03:30

## 2019-02-06 RX ADMIN — Medication 1 MLS/HR: at 16:03

## 2019-02-06 RX ADMIN — HEPARIN SODIUM 1 UNIT: 5000 INJECTION, SOLUTION INTRAVENOUS; SUBCUTANEOUS at 21:19

## 2019-02-06 RX ADMIN — PROPOFOL 1 MLS/HR: 10 INJECTION, EMULSION INTRAVENOUS at 09:00

## 2019-02-06 RX ADMIN — COLLAGENASE SANTYL SCH APPLIC: 250 OINTMENT TOPICAL at 14:52

## 2019-02-06 RX ADMIN — FLUCONAZOLE, SODIUM CHLORIDE SCH MLS/HR: 2 INJECTION INTRAVENOUS at 12:25

## 2019-02-06 RX ADMIN — PHENOBARBITAL SODIUM SCH MG: 65 INJECTION INTRAMUSCULAR; INTRAVENOUS at 16:16

## 2019-02-06 RX ADMIN — PANTOPRAZOLE SODIUM SCH MG: 40 INJECTION, POWDER, FOR SOLUTION INTRAVENOUS at 05:38

## 2019-02-06 RX ADMIN — FOLIC ACID SCH MLS/HR: 5 INJECTION, SOLUTION INTRAMUSCULAR; INTRAVENOUS; SUBCUTANEOUS at 19:26

## 2019-02-06 RX ADMIN — LEVETIRACETAM SCH MLS/HR: 5 INJECTION INTRAVENOUS at 09:58

## 2019-02-06 RX ADMIN — HEPARIN SODIUM SCH UNIT: 5000 INJECTION, SOLUTION INTRAVENOUS; SUBCUTANEOUS at 09:58

## 2019-02-06 RX ADMIN — VANCOMYCIN HYDROCHLORIDE 1 MLS/HR: 1 INJECTION, POWDER, LYOPHILIZED, FOR SOLUTION INTRAVENOUS at 05:39

## 2019-02-06 RX ADMIN — LEVETIRACETAM 1 MLS/HR: 5 INJECTION INTRAVENOUS at 09:58

## 2019-02-06 RX ADMIN — PHENOBARBITAL SODIUM 1 MG: 65 INJECTION INTRAMUSCULAR; INTRAVENOUS at 21:15

## 2019-02-06 RX ADMIN — FOLIC ACID SCH MLS/HR: 5 INJECTION, SOLUTION INTRAMUSCULAR; INTRAVENOUS; SUBCUTANEOUS at 05:42

## 2019-02-06 RX ADMIN — PROPOFOL SCH MLS/HR: 10 INJECTION, EMULSION INTRAVENOUS at 21:00

## 2019-02-06 RX ADMIN — ASCORBIC ACID, VITAMIN A PALMITATE, CHOLECALCIFEROL, THIAMINE HYDROCHLORIDE, RIBOFLAVIN-5 PHOSPHATE SODIUM, PYRIDOXINE HYDROCHLORIDE, NIACINAMIDE, DEXPANTHENOL, ALPHA-TOCOPHEROL ACETATE, VITAMIN K1, FOLIC ACID, BIOTIN, CYANOCOBALAMIN 1 MLS/HR: 200; 3300; 200; 6; 3.6; 6; 40; 15; 10; 150; 600; 60; 5 INJECTION, SOLUTION INTRAVENOUS at 19:26

## 2019-02-06 RX ADMIN — THIAMINE HYDROCHLORIDE 1 MLS/HR: 100 INJECTION, SOLUTION INTRAMUSCULAR; INTRAVENOUS at 00:00

## 2019-02-06 RX ADMIN — LIDOCAINE HYDROCHLORIDE 1 ML: 10 INJECTION, SOLUTION EPIDURAL; INFILTRATION; INTRACAUDAL; PERINEURAL at 13:15

## 2019-02-06 RX ADMIN — ASCORBIC ACID, VITAMIN A PALMITATE, CHOLECALCIFEROL, THIAMINE HYDROCHLORIDE, RIBOFLAVIN-5 PHOSPHATE SODIUM, PYRIDOXINE HYDROCHLORIDE, NIACINAMIDE, DEXPANTHENOL, ALPHA-TOCOPHEROL ACETATE, VITAMIN K1, FOLIC ACID, BIOTIN, CYANOCOBALAMIN 1 MLS/HR: 200; 3300; 200; 6; 3.6; 6; 40; 15; 10; 150; 600; 60; 5 INJECTION, SOLUTION INTRAVENOUS at 05:42

## 2019-02-06 RX ADMIN — PHENOBARBITAL SODIUM SCH MG: 65 INJECTION INTRAMUSCULAR; INTRAVENOUS at 21:15

## 2019-02-06 NOTE — CONS
DATE OF ADMISSION: 02/03/2019

DATE OF CONSULTATION:  

 

 

 

REASON FOR CONSULT:  Mechanical ventilation.

 

HISTORY OF PRESENT ILLNESS:  This is an unfortunate 58-year-old lady with advanced dementia, original
ly admitted on 02/04/2019 with altered mental status and seizures.  The patient had seizures diagnose
d approximately 1 month ago, per her  at bedside today.  Yesterday, she had a further seizure,
 became more altered with respiratory distress requiring emergent intubation and initiation of vasopr
essor support with subsequent transfer to intensive care unit.  Chest x-ray, however, demonstrates no
 significant infiltrates or effusions.

 

PAST MEDICAL HISTORY:  

1.  Advanced dementia.

2.  West Nile virus infection.

3.  Significant debilitation.

4.  Seizure history.

 

MEDICATIONS:  Per chart.

 

ALLERGIES:  NONE.

 

SOCIAL HISTORY:  Nonsmoker, no alcohol, no history of drug use.

 

FAMILY HISTORY:  Noncontributory.

 

SYSTEMS REVIEW:  A 12-point review of systems unable to perform.

 

PHYSICAL EXAMINATION:

GENERAL:  Elderly, chronically ill-appearing lady, orally intubated on mechanical ventilation.

VITAL SIGNS:  Currently afebrile, pulse is 70, blood pressure 96/70 on Levophed.

NECK:  Supple.  No JVD, lymphadenopathy.

CARDIAC:  S1, S2, no added sounds or murmurs.

CHEST:  Diminished air entry bilaterally.

ABDOMEN:  Soft, nontender.  No guarding or rebound.

EXTREMITIES:  No cyanosis, clubbing, or edema.

NEUROLOGIC:  Unable to assess, currently sedated.

 

LABORATORY DATA:  White count 12.0, hemoglobin 11.7, platelets of 290, BUN 3, creatinine 0.28.  INR w
as 0.99.  Arterial blood gas pH was 7.39, pCO2 of 28, pO2 of 215, with a bicarbonate of 16.8.

 

DIAGNOSTIC DATA:  Chest x-ray showed no infiltrates or effusions.  MRI of the brain demonstrated no a
cute intracranial abnormalities.

 

IMPRESSION AND PLAN:

1.  Further seizure with altered mental status requiring intubation for airway protection.

2.  Metabolic acidosis likely secondary to hypoperfusion.

3.  Significant lactic acidosis with evidence of septic shock given hypotension and vasopressor suppo
rt.

 

The patient will require:

1.  Continue fluid resuscitation.

2.  Intravenous antibiotics pending culture results.

3.  Continue antiepileptics.

4.  EEG evaluation and neurology recommendations.

5.  DVT and GI prophylaxis.

 

 

Dictated By: RIC CAROLINA MD

 

SV/MAKENNA

DD:    02/06/2019 09:54:13

DT:    02/06/2019 11:23:39

Conf#: [id number]

DID#:  [id number]

## 2019-02-06 NOTE — CONS
Assessment/Plan


Assessment/Plan


Hospital Course


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 


p/w ams.


She was additionally reported to have a seizure... for which neurology is co


nsulted.





Most clinically consistent with new onset epilepsy.





Initial EEG was notable for GPEDs and an electrographic seizure originating in 


the R temporal region..


s/p Dilantin load, with ongoing episodic staring spells (presumed seizures) 


noted clinically..


s/p Phenobarbital load on 2/5..with resolution of episodes suspicious for 


seizure








P:


Await MRI brain with and without contrast for further characterization


Await phb level; start 30 mg bid maintenance for now..


Hold Keppra and Dilantin for now


Limit other sedating medications where possible


Other medical management per primary


Will follow clinically





Consultation Date/Type/Reason


Admit Date/Time


Feb 3, 2019 at 22:12


Type of Consult


Neurology


Requesting Provider:  LAURENCE CASTELLANO


Date/Time of Note


DATE: 2/6/19 


TIME: 14:37





24 HR Interval Summary


Free Text/Dictation


Tx to icu..


s/p phb load..


now on levo


Subjective hx not possible:  pt non-verbal, pt critical





Exam


Vital Signs


Vitals





Vital Signs


  Date      Temp  Pulse  Resp  B/P (MAP)   Pulse Ox  O2          O2 Flow    FiO2


Time                                                 Delivery    Rate


    2/6/19           77    13      123/85       100  Mechanical


     13:00                           (98)            Ventilator


    2/6/19                                                                    40


     12:20


    2/6/19  98.0


     12:00


    2/5/19                                                             2.0


     09:01








Intake and Output





2/5/19 2/5/19 2/6/19





1515:00


23:00


07:00





IntakeIntake Total


0 ml


569 ml


793.25 ml





OutputOutput Total


1450 ml


1420 ml





BalanceBalance


0 ml


-881 ml


-626.75 ml














Exam


PE:


Gen Appearance: No Apparent Distress


HEENT: Intubated


Cardiovascular: Regular rate


Abdomen: Soft


Extremities: Dry





NE:


The patient was comatose.





Cranial nerve examination was limited by mental status. Pupils were equal and 


reactive to light. There was no afferent pupillary defect. Funduscopic 


examination was limited. Face was grossly symmetric, w/ present corneal and 


cough reflexes.





Tone was normal. Muscle bulk was normal. I did not see fasciculations. The 


patient withdrew to noxious stimulation in her LE.





Coordination and gait testing was limited by mental status.





Arm and leg reflexes were within normal limits and symmetric. Richmond's sign was


absent. Plantar responses were flexor.











JOSE FRANCOIS                  Feb 6, 2019 14:42

## 2019-02-06 NOTE — CONS
DATE OF ADMISSION: 02/03/2019

DATE OF CONSULTATION:  02/06/2019

 

 

 

TYPE OF CONSULTATION:  Infectious disease.

 

REASON FOR CONSULTATION:  Antibiotic management.

 

HISTORY OF PRESENT ILLNESS:  Juanjose Hassan is a 58-year-old female, who comes in with altered me
ntal status and new seizures.  The patient was transferred from an outside hospital.  Her past proble
ms include:

1.  Severe dementia.  The patient is bedridden and nonverbal, cared for at home by her .  She 
was brought in from an outside hospital ER with altered mental status.  She apparently also had some 
subjective fevers at home recently.  The family found the patient less responsive on the day prior to
 admission, the 2nd, with possible seizure activity, which the patient had never had.  She received V
ersed in the ER and transferred probably to the ICU where she was found to have a sodium of 127, whit
e count of 11,000.  Lactic acid of 8.4.  She received 2 liters of IV fluids because of low blood pres
sure and came back to her baseline blood pressure.  She was also found to have a UTI at the outside 
ospital.  She received Keppra 500 mg IV piggyback and was transferred to [facility].  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
ion.

 

PAST MEDICAL HISTORY:  As outlined.

 

FAMILY HISTORY:  Positive for Alzheimer disease in the mother.

 

SOCIAL HISTORY:  She does not smoke, drink or abuse drugs.

 

ALLERGIES:  NONE TO PENICILLIN, SULFA OR FOODS.

 

MEDICATIONS:  Per chart.

 

REVIEW OF SYSTEMS:  Noncontributory.

 

LABORATORY:  On admission, her white count was 11.2, H and H of 11.2 and 33, platelet count of 375.  
BUN and creatinine 4/0.89, glucose 147.  Urinalysis shows positive nitrites and leukocyte esterase.  
CT scan of the head in the outside hospital showed no acute intracranial pathology.  The patient was 
felt to have a UTI with loose onset of seizures.  She was placed on broad spectrum antibiotics.  She 
has been on ceftriaxone, but she has also been on Propofol, norepinephrine and fluconazole.  The nore
pinephrine to maintain her blood pressure.  Chest x-ray on the 5th was normal.  Currently, she has an
 endotracheal tube and NG tube.  There is a surgical clip overlying the right hilar region.  No pierce
e from prior chest x-ray done earlier today.  The patient was seen by Dr. Hoffman.  She has seizures 
diagnosed 1 month ago per her .  She has advanced dementia ____ fever, significant debility, s
eizure history.

 

PHYSICAL EXAMINATION:

GENERAL:  The patient is a chronically ill-appearing female, who was intubated on mechanical ventilat
ion, has an NG tube in place as well.

SKIN:  Without generalized rash.

HEENT:  Within normal limits.

NECK:  Supple.

LYMPH NODES:  None palpable.

CHEST:  Decreased breath sounds at the bases.

HEART:  Without murmur or gallop.

ABDOMEN:  Soft, nontender, without organosplenomegaly or masses.

EXTREMITIES:  Without cyanosis, clubbing, or edema.

RECTAL AND GENITAL:  Deferred.

NEUROLOGIC:  No focal neurological abnormality.  She is currently sedated.

 

ANCILLARY LABORATORY DATA:  White count today is 12,000, hemoglobin 11.7, platelets 290,000.  BUN and
 creatinine 3/0.28.  We will continue her on current therapy.  She has no fever at this point in time
.  Her urine culture was negative.  Chest x-ray is negative.  We may end up stopping the ceftriaxone 
shortly.  I will dictate my findings to the hospitalist and the aforementioned consultants.

 

 

Dictated By: JERROLD DREYER MD JD/MAKENNA

DD:    02/06/2019 14:19:42

DT:    02/06/2019 15:33:24

Conf#: [id number]

DID#:  [id number]

CC: LAURENCE CASTELLANO; RIC HOFFMAN MD; TEJAS SALMERON MD;*EndCC*

## 2019-02-06 NOTE — PN
Date/Time of Note


Date/Time of Note


DATE: 2/6/19 


TIME: 11:06





Assessment/Plan


VTE Prophylaxis


Risk score (from Ns)>0 risk:  6


SCD applied (from Ns):  Yes


Pharmacological prophylaxis:  heparin





Lines/Catheters


IV Catheter Type (from Nrsg):  Intraosseous


Central line still needed:  Yes


Urinary Cath still in place:  Yes


Reason Cath still needed:  other (indicate)





Assessment/Plan


Hospital Course


S: Patient got intubated yesterday due to inability to protect her airway 


secondary to antiseizure medication which was required for ongoing seizures.  


Intubation was prophylactic.  She is maintaining the intensive care unit now on 


ventilator support.  She remains sedated only from antiseizure medications.  Khurram


ses have noted milky urine in copious amounts coming from Davila catheter, 


patient also had episodes of hypothermia yesterday. She has been put on a Louis 


hugger, with improvement.            





O:


Constitutional:  sleeping, will barely open eyes with aggressive  stimulation, 


no seizure like activity


Head:  atraumatic, normocephalic, NC 


Neck:  non-tender, supple


Respiratory:  coarse


Cardiovascular:  regular rate and rhythm


Gastrointestinal:  S/ NT / ND / +BS


Extremities:  no edema, good radial pulses, frail, 





assessment and Plan:


59 yo F with a slow early onset dementia, cause unknown who is non verbal at 


baseline who was brought in for abnormal jerking behavior concerning for 


seizures 





1. Altered mentation with new onset seizures


   -acute on chronic 


   -Brain CT at outside hospital negative per report


   -1st EEG confirms ongoing seizure while on keppra


   -Repeat EEG after phenobarbital therapy should abnormal electroencephalogram 


due to severe diffuse slowing.


   -MRI of the brain with and without contrast showed  chronic  parietal and 


frontal lobe cysts.








2.  Sespis 2/2 Nitrite + Urinary tract infection ?


   -was not present on admission


   -multiorganism


   -continue empiric abx, continue to monitor cultures





3.  Chronic dementia/encephalopathy, 


   -nonverbal and bedbound at baseline.  Of gradual onset, no organic cause 


found despite extensive workup per 


   -cared for at home by  and children 24hours





4.  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





5.  Chronic dyslipidemia





6.  Chronic hypertension





7. Chronic psychiatric d/c on risperdone with benztropine for tardive dyskinesia


?


   -dose of benztropine recently reduced 





8.  Respiratory failure s/p prophylactic intubation 2/2 encephalopathy


   -appreciate pulm input


   -continue vent support for now





Plan :


   -At this time continue ICU monitoring and management


   -Await neurology review and recommendations for today


   -Continue antibiotics and antifungal and follow-up blood as well as repeat 


urine cultures commence tube feeds


   -Commence tube feeds


   -Continue serial labs and close monitoring


   -Further interventions will depend on clinical course


   -POC discussed in detail with . 





Care time 40mins


Result Diagram:  


2/6/19 0438                                                                     


          2/6/19 0438





Results 24hrs





Laboratory Tests


Test
             2/5/19
11:28      2/5/19
18:17      2/5/19
20:20  2/6/19
00:04


Phenytoin             27.0  *H


(Dilantin) Level


Bedside Glucose                            153


Blood Gas         
             
                 Blood arterial
   



Specimen Source



Arterial Blood    
             
                 2/5/2019
8:50:47  



Date Drawn
                                                     PM


Arterial Blood    
             
                         7.400  
  



pH


(Temp
corrected)


Arterial Blood    
             
                         26.4  L
  



pCO2


(Temp
correct)


Arterial Blood    
             
                        625.9  H
  



pO2


(Temp
corrected)


Arterial Blood                                             16.0  L


HCO3


Arterial Blood                                             -7.2  L


Base Excess


Arterial Blood    
             
                         99.5  H
  



Oxygen
Saturatio


n


Praveen Test                                        ACCEPTAB


Arterial Blood    
             
                 Right Radial  
   



Gas


Puncture
Site


Arterial          
             
                           0.3  
  



Blood
Carboxyhem


oglobin


Arterial Blood                                               0.4


Methemoglobin


Blood Gas A-a O2                                           60.7  H


Differential


Oxyhemoglobin                                               98.8


Percent


Blood Gas                                                   37.0


Temperature


Blood Gas                                                   14.0


Respiration Rate


Blood Gas Actual  
             
                            14  
  



Respiration
Rate


Blood Gas                                         VENT - AC


Modality


FiO2                                                       100.0


Blood Gas Tidal                                            450.0


Volume


Blood Gas Low                                                5.0


PEEP Setting


Blood Gas                                                   23.0


Inspiratory


Pressure


Blood Gas                                         MG


Notified Whom


Blood Gas         
             
                 2/5/2019
8:59:41  



Notified Time
                                                  PM


Lactic Acid                                                                1.5


Level


Test
             2/6/19
04:38      2/6/19
07:00      2/6/19
07:10  



White Blood            12.0  H


Count


Red Blood Count        3.70  L


Hemoglobin             11.7  L


Hematocrit             32.5  L


Mean Corpuscular        87.8


Volume


Mean Corpuscular        31.6


Hemoglobin


Mean Corpuscular       36.0  
  
                 
                 



Hemoglobin
Carolyn


nt


Red Cell                11.7


Distribution


Width


Platelet Count           290


Mean Platelet            8.9


Volume


Immature               0.300


Granulocytes %


Neutrophils %          81.7  H


Lymphocytes %          10.9  L


Monocytes %              6.2


Eosinophils %            0.6


Basophils %              0.3


Nucleated Red            0.0


Blood Cells %


Immature              0.040  H


Granulocytes #


Neutrophils #           9.8  H


Lymphocytes #            1.3


Monocytes #              0.7


Eosinophils #            0.1


Basophils #              0.0


Nucleated Red            0.0


Blood Cells #


Sodium Level            133  L


Potassium Level          3.7


Chloride Level          111  H


Carbon Dioxide           17  L


Level


Anion Gap                  5


Blood Urea                3  L


Nitrogen


Creatinine             0.28  L


Est Glomerular    > 60  
       
                 
                 



Filtrat


Rate
mL/min


Glucose Level           201  #


Calcium Level           8.3  L


Phenytoin               19.2


(Dilantin) Level


Blood Gas         
             Blood arterial
   
                 



Specimen Source



Arterial Blood    
             2/6/2019
7:10:56  
                 



Date Drawn
                                   AM


Arterial Blood    
                     7.399  
  
                 



pH


(Temp
corrected)


Arterial Blood    
                     27.8  L
  
                 



pCO2


(Temp
correct)


Arterial Blood    
                    214.3  H
  
                 



pO2


(Temp
corrected)


Arterial Blood                           16.8  L


HCO3


Arterial Blood                           -6.7  L


Base Excess


Arterial Blood    
                     98.8  H
  
                 



Oxygen
Saturatio


n


Praveen Test                      ACCEPTAB


Arterial Blood    
             Right Radial  
   
                 



Gas


Puncture
Site


Arterial          
                       0.3  
  
                 



Blood
Carboxyhem


oglobin


Arterial Blood                             0.2


Methemoglobin


Blood Gas A-a O2                         38.9  H


Differential


Oxyhemoglobin                             98.3


Percent


Blood Gas                                 37.0


Temperature


Blood Gas                                 14.0


Respiration Rate


Blood Gas Actual  
                        14  
  
                 



Respiration
Rate


Blood Gas                       VENT - AC


Modality


FiO2                                      40.0


Blood Gas Tidal                          400.0


Volume


Blood Gas Low                              5.0


PEEP Setting


Blood Gas                       TM


Notified Whom


Blood Gas         
             2/6/2019
7:27:04  
                 



Notified Time
                                AM


Lactic Acid                                                  1.6


Level








Exam/Review of Systems


Exam


Vitals





Vital Signs


  Date      Temp  Pulse  Resp  B/P (MAP)   Pulse Ox  O2          O2 Flow    FiO2


Time                                                 Delivery    Rate


    2/6/19                                                                    40


     10:35


    2/6/19           73


     08:00


    2/6/19                 14  86/67 (73)


     06:15


    2/6/19                                           Mechanical


     06:00                                           Ventilator


    2/6/19                                      100


     05:10


    2/6/19  93.6


     04:00


    2/5/19                                                             2.0


     09:01








Intake and Output





2/5/19 2/5/19 2/6/19





1515:00


23:00


07:00





IntakeIntake Total


0 ml


569 ml


793.25 ml





OutputOutput Total


1450 ml


1390 ml





BalanceBalance


0 ml


-881 ml


-596.75 ml














Results


Results 24hrs





Laboratory Tests


Test
             2/5/19
11:28      2/5/19
18:17      2/5/19
20:20  2/6/19
00:04


Phenytoin             27.0  *H


(Dilantin) Level


Bedside Glucose                            153


Blood Gas         
             
                 Blood arterial
   



Specimen Source



Arterial Blood    
             
                 2/5/2019
8:50:47  



Date Drawn
                                                     PM


Arterial Blood    
             
                         7.400  
  



pH


(Temp
corrected)


Arterial Blood    
             
                         26.4  L
  



pCO2


(Temp
correct)


Arterial Blood    
             
                        625.9  H
  



pO2


(Temp
corrected)


Arterial Blood                                             16.0  L


HCO3


Arterial Blood                                             -7.2  L


Base Excess


Arterial Blood    
             
                         99.5  H
  



Oxygen
Saturatio


n


Praveen Test                                        ACCEPTAB


Arterial Blood    
             
                 Right Radial  
   



Gas


Puncture
Site


Arterial          
             
                           0.3  
  



Blood
Carboxyhem


oglobin


Arterial Blood                                               0.4


Methemoglobin


Blood Gas A-a O2                                           60.7  H


Differential


Oxyhemoglobin                                               98.8


Percent


Blood Gas                                                   37.0


Temperature


Blood Gas                                                   14.0


Respiration Rate


Blood Gas Actual  
             
                            14  
  



Respiration
Rate


Blood Gas                                         VENT - AC


Modality


FiO2                                                       100.0


Blood Gas Tidal                                            450.0


Volume


Blood Gas Low                                                5.0


PEEP Setting


Blood Gas                                                   23.0


Inspiratory


Pressure


Blood Gas                                         MG


Notified Whom


Blood Gas         
             
                 2/5/2019
8:59:41  



Notified Time
                                                  PM


Lactic Acid                                                                1.5


Level


Test
             2/6/19
04:38      2/6/19
07:00      2/6/19
07:10  



White Blood            12.0  H


Count


Red Blood Count        3.70  L


Hemoglobin             11.7  L


Hematocrit             32.5  L


Mean Corpuscular        87.8


Volume


Mean Corpuscular        31.6


Hemoglobin


Mean Corpuscular       36.0  
  
                 
                 



Hemoglobin
Carolyn


nt


Red Cell                11.7


Distribution


Width


Platelet Count           290


Mean Platelet            8.9


Volume


Immature               0.300


Granulocytes %


Neutrophils %          81.7  H


Lymphocytes %          10.9  L


Monocytes %              6.2


Eosinophils %            0.6


Basophils %              0.3


Nucleated Red            0.0


Blood Cells %


Immature              0.040  H


Granulocytes #


Neutrophils #           9.8  H


Lymphocytes #            1.3


Monocytes #              0.7


Eosinophils #            0.1


Basophils #              0.0


Nucleated Red            0.0


Blood Cells #


Sodium Level            133  L


Potassium Level          3.7


Chloride Level          111  H


Carbon Dioxide           17  L


Level


Anion Gap                  5


Blood Urea                3  L


Nitrogen


Creatinine             0.28  L


Est Glomerular    > 60  
       
                 
                 



Filtrat


Rate
mL/min


Glucose Level           201  #


Calcium Level           8.3  L


Phenytoin               19.2


(Dilantin) Level


Blood Gas         
             Blood arterial
   
                 



Specimen Source



Arterial Blood    
             2/6/2019
7:10:56  
                 



Date Drawn
                                   AM


Arterial Blood    
                     7.399  
  
                 



pH


(Temp
corrected)


Arterial Blood    
                     27.8  L
  
                 



pCO2


(Temp
correct)


Arterial Blood    
                    214.3  H
  
                 



pO2


(Temp
corrected)


Arterial Blood                           16.8  L


HCO3


Arterial Blood                           -6.7  L


Base Excess


Arterial Blood    
                     98.8  H
  
                 



Oxygen
Saturatio


n


Praveen Test                      ACCEPTAB


Arterial Blood    
             Right Radial  
   
                 



Gas


Puncture
Site


Arterial          
                       0.3  
  
                 



Blood
Carboxyhem


oglobin


Arterial Blood                             0.2


Methemoglobin


Blood Gas A-a O2                         38.9  H


Differential


Oxyhemoglobin                             98.3


Percent


Blood Gas                                 37.0


Temperature


Blood Gas                                 14.0


Respiration Rate


Blood Gas Actual  
                        14  
  
                 



Respiration
Rate


Blood Gas                       VENT - AC


Modality


FiO2                                      40.0


Blood Gas Tidal                          400.0


Volume


Blood Gas Low                              5.0


PEEP Setting


Blood Gas                       TM


Notified Whom


Blood Gas         
             2/6/2019
7:27:04  
                 



Notified Time
                                AM


Lactic Acid                                                  1.6


Level








Medications


Medication





Current Medications


IV Flush (NS 3 ml) 3 ml PER PROTOCOL IV ;  Start 2/4/19 at 03:30


Ondansetron HCl (Zofran Inj) 4 mg Q6H  PRN IV NAUSEA/VOMITING;  Start 2/4/19 at 


03:30


Acetaminophen (Tylenol Tab) 650 mg Q6H  PRN PO .PAIN 1-3 OR TEMP;  Start 2/4/19 


at 03:30


Acetaminophen/ Hydrocodone Bitart (Norco (5/325)) 1 tab Q6H  PRN PO .MOD PAIN 4-


6;  Start 2/4/19 at 03:30


Morphine Sulfate (morphine SULFATE (PF)) 2 mg Q4H  PRN IV .SEVERE PAIN 7-10;  


Start 2/4/19 at 03:30


Docusate Sodium (Colace) 100 mg Q12H  PRN PO .CONSTIPATION;  Start 2/4/19 at 


03:30


Magnesium Hydroxide (Milk Of Mag) 30 ml DAILY  PRN PO .CONSTIPATION;  Start 


2/4/19 at 03:30


Pantoprazole (Protonix Iv) 40 mg DAILY@06 IV  Last administered on 2/6/19at 


05:38; Admin Dose 40 MG;  Start 2/4/19 at 06:00


Heparin Sodium (Porcine) (Heparin  (5000 Units/1ml)) 5,000 unit Q12 SC  Last 


administered on 2/6/19at 09:58; Admin Dose 5,000 UNIT;  Start 2/4/19 at 09:00


Albuterol/ Ipratropium (Duoneb) 3 ml Q4H RESP THERAPY  PRN HHN SHORTNESS OF 


BREATH;  Start 2/4/19 at 03:30


Hydralazine HCl (Apresoline) 10 mg Q6H  PRN IV ELEVATED BLOOD PRESSURE;  Start 


2/4/19 at 03:30


Ceftriaxone Sodium 50 ml @  100 mls/hr Q24H IVPB  Last administered on 2/6/19at 


03:30; Admin Dose 100 MLS/HR;  Start 2/4/19 at 03:30


Nitroglycerin (Nitroglycerin (Sl Tab) 0.4 Mg) 1 tab Q5M  PRN SL ANGINA;  Start 


2/4/19 at 03:30


Lorazepam (Ativan) 1 mg Q2H  PRN IV SEIZURES Last administered on 2/4/19at 


03:57; Admin Dose 1 MG;  Start 2/4/19 at 03:30


Miscellaneous Information (Pending [facility] Order For Wound Care) This patient 


ha... PRN  PRN XX WOUND CARE;  Start 2/4/19 at 05:00


Levetiracetam 100 ml @  400 mls/hr Q12 IVPB  Last administered on 2/6/19at 


09:58; Admin Dose 400 MLS/HR;  Start 2/5/19 at 07:30


Dextrose/Sodium Chloride 1,000 ml @  75 mls/hr [id number] IV  Last administered on 


2/6/19at 05:42; Admin Dose 75 MLS/HR;  Start 2/5/19 at 17:00


Propofol 100 ml @  1.227 mls/ hr Q12H IV ;  Start 2/5/19 at 21:00


Norepinephrine 250 ml @  1.875 mls/ hr TITRATE IV  Last administered on 2/5/19at


21:00; Admin Dose 1.875 MLS/HR;  Start 2/5/19 at 23:00











TEJAS SALMERON                 Feb 6, 2019 11:16

## 2019-02-06 NOTE — EEG
EEG NOTE


Report Details


DATE OF TEST: 2/5/19





HISTORY:


The patient is a 58-year-old F who presents with altered mental status.





This EEG is requested to rule out nonconvulsive status epilepticus.





SEDATION:


None.





CONDITIONS OF RECORDING:


This EEG was recorded digitally on the [employer] machine, using the 


International 10-20 System of electrodes plus anterior temporals and Nz.





STATES SAMPLED:


Obtunded.





FINDINGS:


The background is discontinuous, though grossly symmetric... 





Continuous intervals are predominated by polymorphic theta and delta.





The normal anterior-to-posterior frequency-amplitude gradient was absent.





Photic stimulation does not elicit any definite driving responses or 


epileptiform discharges.





Hyperventilation was not performed.





No epileptiform discharges were seen.








IMPRESSION:


Abnormal electroencephalogram due to: severe diffuse slowing.





COMMENT:


The slowing of the background indicates severe, diffuse cortical dysfunction of 


nonspecific etiology.











JOSE FRANCOIS                  Feb 6, 2019 07:12

## 2019-02-07 VITALS — DIASTOLIC BLOOD PRESSURE: 71 MMHG | RESPIRATION RATE: 15 BRPM | SYSTOLIC BLOOD PRESSURE: 101 MMHG | HEART RATE: 98 BPM

## 2019-02-07 VITALS — SYSTOLIC BLOOD PRESSURE: 99 MMHG | RESPIRATION RATE: 14 BRPM | DIASTOLIC BLOOD PRESSURE: 73 MMHG | HEART RATE: 94 BPM

## 2019-02-07 VITALS — SYSTOLIC BLOOD PRESSURE: 121 MMHG | DIASTOLIC BLOOD PRESSURE: 86 MMHG | RESPIRATION RATE: 14 BRPM | HEART RATE: 100 BPM

## 2019-02-07 VITALS — HEART RATE: 85 BPM | DIASTOLIC BLOOD PRESSURE: 74 MMHG | RESPIRATION RATE: 12 BRPM | SYSTOLIC BLOOD PRESSURE: 95 MMHG

## 2019-02-07 VITALS — RESPIRATION RATE: 14 BRPM

## 2019-02-07 VITALS — RESPIRATION RATE: 14 BRPM | DIASTOLIC BLOOD PRESSURE: 74 MMHG | HEART RATE: 101 BPM | SYSTOLIC BLOOD PRESSURE: 99 MMHG

## 2019-02-07 VITALS — DIASTOLIC BLOOD PRESSURE: 88 MMHG | HEART RATE: 103 BPM | RESPIRATION RATE: 15 BRPM | SYSTOLIC BLOOD PRESSURE: 119 MMHG

## 2019-02-07 VITALS — SYSTOLIC BLOOD PRESSURE: 93 MMHG | HEART RATE: 88 BPM | DIASTOLIC BLOOD PRESSURE: 72 MMHG | RESPIRATION RATE: 12 BRPM

## 2019-02-07 VITALS — DIASTOLIC BLOOD PRESSURE: 78 MMHG | HEART RATE: 81 BPM | SYSTOLIC BLOOD PRESSURE: 97 MMHG

## 2019-02-07 VITALS — RESPIRATION RATE: 14 BRPM | HEART RATE: 93 BPM | SYSTOLIC BLOOD PRESSURE: 107 MMHG | DIASTOLIC BLOOD PRESSURE: 76 MMHG

## 2019-02-07 VITALS — HEART RATE: 86 BPM | DIASTOLIC BLOOD PRESSURE: 73 MMHG | SYSTOLIC BLOOD PRESSURE: 102 MMHG | RESPIRATION RATE: 14 BRPM

## 2019-02-07 VITALS — RESPIRATION RATE: 14 BRPM | SYSTOLIC BLOOD PRESSURE: 94 MMHG | HEART RATE: 108 BPM | DIASTOLIC BLOOD PRESSURE: 72 MMHG

## 2019-02-07 VITALS — SYSTOLIC BLOOD PRESSURE: 96 MMHG | RESPIRATION RATE: 14 BRPM | DIASTOLIC BLOOD PRESSURE: 73 MMHG | HEART RATE: 88 BPM

## 2019-02-07 VITALS — RESPIRATION RATE: 14 BRPM | DIASTOLIC BLOOD PRESSURE: 75 MMHG | HEART RATE: 84 BPM | SYSTOLIC BLOOD PRESSURE: 102 MMHG

## 2019-02-07 VITALS — RESPIRATION RATE: 14 BRPM | HEART RATE: 85 BPM | DIASTOLIC BLOOD PRESSURE: 72 MMHG | SYSTOLIC BLOOD PRESSURE: 94 MMHG

## 2019-02-07 VITALS — RESPIRATION RATE: 14 BRPM | DIASTOLIC BLOOD PRESSURE: 71 MMHG | SYSTOLIC BLOOD PRESSURE: 98 MMHG | HEART RATE: 92 BPM

## 2019-02-07 VITALS — HEART RATE: 86 BPM | RESPIRATION RATE: 14 BRPM | SYSTOLIC BLOOD PRESSURE: 70 MMHG | DIASTOLIC BLOOD PRESSURE: 54 MMHG

## 2019-02-07 VITALS — DIASTOLIC BLOOD PRESSURE: 74 MMHG | HEART RATE: 85 BPM | RESPIRATION RATE: 14 BRPM | SYSTOLIC BLOOD PRESSURE: 100 MMHG

## 2019-02-07 VITALS — HEART RATE: 86 BPM | SYSTOLIC BLOOD PRESSURE: 97 MMHG | RESPIRATION RATE: 15 BRPM | DIASTOLIC BLOOD PRESSURE: 74 MMHG

## 2019-02-07 VITALS — RESPIRATION RATE: 15 BRPM | DIASTOLIC BLOOD PRESSURE: 74 MMHG | SYSTOLIC BLOOD PRESSURE: 90 MMHG | HEART RATE: 106 BPM

## 2019-02-07 VITALS — DIASTOLIC BLOOD PRESSURE: 90 MMHG | HEART RATE: 103 BPM | RESPIRATION RATE: 15 BRPM | SYSTOLIC BLOOD PRESSURE: 119 MMHG

## 2019-02-07 VITALS — RESPIRATION RATE: 12 BRPM | HEART RATE: 83 BPM | DIASTOLIC BLOOD PRESSURE: 76 MMHG | SYSTOLIC BLOOD PRESSURE: 97 MMHG

## 2019-02-07 VITALS — RESPIRATION RATE: 14 BRPM | HEART RATE: 94 BPM | DIASTOLIC BLOOD PRESSURE: 78 MMHG | SYSTOLIC BLOOD PRESSURE: 104 MMHG

## 2019-02-07 VITALS — DIASTOLIC BLOOD PRESSURE: 73 MMHG | SYSTOLIC BLOOD PRESSURE: 95 MMHG | RESPIRATION RATE: 12 BRPM | HEART RATE: 80 BPM

## 2019-02-07 VITALS — RESPIRATION RATE: 12 BRPM | HEART RATE: 88 BPM | SYSTOLIC BLOOD PRESSURE: 97 MMHG | DIASTOLIC BLOOD PRESSURE: 73 MMHG

## 2019-02-07 VITALS — SYSTOLIC BLOOD PRESSURE: 94 MMHG | RESPIRATION RATE: 14 BRPM | DIASTOLIC BLOOD PRESSURE: 74 MMHG | HEART RATE: 91 BPM

## 2019-02-07 VITALS — RESPIRATION RATE: 14 BRPM | HEART RATE: 83 BPM | DIASTOLIC BLOOD PRESSURE: 75 MMHG | SYSTOLIC BLOOD PRESSURE: 100 MMHG

## 2019-02-07 VITALS — HEART RATE: 94 BPM | DIASTOLIC BLOOD PRESSURE: 85 MMHG | SYSTOLIC BLOOD PRESSURE: 108 MMHG | RESPIRATION RATE: 14 BRPM

## 2019-02-07 VITALS — DIASTOLIC BLOOD PRESSURE: 87 MMHG | SYSTOLIC BLOOD PRESSURE: 113 MMHG | HEART RATE: 100 BPM | RESPIRATION RATE: 14 BRPM

## 2019-02-07 VITALS — SYSTOLIC BLOOD PRESSURE: 105 MMHG | RESPIRATION RATE: 14 BRPM | HEART RATE: 94 BPM | DIASTOLIC BLOOD PRESSURE: 81 MMHG

## 2019-02-07 VITALS — SYSTOLIC BLOOD PRESSURE: 119 MMHG | DIASTOLIC BLOOD PRESSURE: 86 MMHG | HEART RATE: 101 BPM | RESPIRATION RATE: 14 BRPM

## 2019-02-07 VITALS — RESPIRATION RATE: 14 BRPM | DIASTOLIC BLOOD PRESSURE: 86 MMHG | HEART RATE: 95 BPM | SYSTOLIC BLOOD PRESSURE: 115 MMHG

## 2019-02-07 VITALS — HEART RATE: 104 BPM | SYSTOLIC BLOOD PRESSURE: 120 MMHG | DIASTOLIC BLOOD PRESSURE: 89 MMHG | RESPIRATION RATE: 15 BRPM

## 2019-02-07 VITALS — SYSTOLIC BLOOD PRESSURE: 101 MMHG | RESPIRATION RATE: 14 BRPM | DIASTOLIC BLOOD PRESSURE: 79 MMHG | HEART RATE: 83 BPM

## 2019-02-07 VITALS — RESPIRATION RATE: 14 BRPM | DIASTOLIC BLOOD PRESSURE: 89 MMHG | SYSTOLIC BLOOD PRESSURE: 116 MMHG | HEART RATE: 103 BPM

## 2019-02-07 VITALS — DIASTOLIC BLOOD PRESSURE: 76 MMHG | SYSTOLIC BLOOD PRESSURE: 100 MMHG | RESPIRATION RATE: 14 BRPM | HEART RATE: 81 BPM

## 2019-02-07 VITALS — RESPIRATION RATE: 14 BRPM | DIASTOLIC BLOOD PRESSURE: 83 MMHG | HEART RATE: 95 BPM | SYSTOLIC BLOOD PRESSURE: 113 MMHG

## 2019-02-07 VITALS — HEART RATE: 87 BPM | RESPIRATION RATE: 14 BRPM | SYSTOLIC BLOOD PRESSURE: 99 MMHG | DIASTOLIC BLOOD PRESSURE: 74 MMHG

## 2019-02-07 VITALS — HEART RATE: 96 BPM | DIASTOLIC BLOOD PRESSURE: 76 MMHG | SYSTOLIC BLOOD PRESSURE: 97 MMHG | RESPIRATION RATE: 14 BRPM

## 2019-02-07 VITALS — RESPIRATION RATE: 14 BRPM | DIASTOLIC BLOOD PRESSURE: 74 MMHG | HEART RATE: 89 BPM | SYSTOLIC BLOOD PRESSURE: 100 MMHG

## 2019-02-07 VITALS — SYSTOLIC BLOOD PRESSURE: 111 MMHG | DIASTOLIC BLOOD PRESSURE: 82 MMHG | RESPIRATION RATE: 14 BRPM | HEART RATE: 93 BPM

## 2019-02-07 VITALS — HEART RATE: 107 BPM | RESPIRATION RATE: 14 BRPM | DIASTOLIC BLOOD PRESSURE: 76 MMHG | SYSTOLIC BLOOD PRESSURE: 98 MMHG

## 2019-02-07 VITALS — SYSTOLIC BLOOD PRESSURE: 111 MMHG | RESPIRATION RATE: 14 BRPM | DIASTOLIC BLOOD PRESSURE: 84 MMHG | HEART RATE: 94 BPM

## 2019-02-07 VITALS — RESPIRATION RATE: 14 BRPM | DIASTOLIC BLOOD PRESSURE: 69 MMHG | HEART RATE: 92 BPM | SYSTOLIC BLOOD PRESSURE: 94 MMHG

## 2019-02-07 VITALS — DIASTOLIC BLOOD PRESSURE: 82 MMHG | HEART RATE: 91 BPM | SYSTOLIC BLOOD PRESSURE: 111 MMHG | RESPIRATION RATE: 14 BRPM

## 2019-02-07 VITALS — RESPIRATION RATE: 14 BRPM | DIASTOLIC BLOOD PRESSURE: 81 MMHG | HEART RATE: 90 BPM | SYSTOLIC BLOOD PRESSURE: 110 MMHG

## 2019-02-07 VITALS — SYSTOLIC BLOOD PRESSURE: 92 MMHG | HEART RATE: 88 BPM | DIASTOLIC BLOOD PRESSURE: 69 MMHG | RESPIRATION RATE: 24 BRPM

## 2019-02-07 VITALS — DIASTOLIC BLOOD PRESSURE: 76 MMHG | SYSTOLIC BLOOD PRESSURE: 97 MMHG | RESPIRATION RATE: 14 BRPM | HEART RATE: 97 BPM

## 2019-02-07 VITALS — RESPIRATION RATE: 12 BRPM | SYSTOLIC BLOOD PRESSURE: 95 MMHG | HEART RATE: 87 BPM | DIASTOLIC BLOOD PRESSURE: 72 MMHG

## 2019-02-07 VITALS — HEART RATE: 89 BPM | SYSTOLIC BLOOD PRESSURE: 92 MMHG | DIASTOLIC BLOOD PRESSURE: 71 MMHG | RESPIRATION RATE: 25 BRPM

## 2019-02-07 VITALS — DIASTOLIC BLOOD PRESSURE: 77 MMHG | RESPIRATION RATE: 14 BRPM | HEART RATE: 83 BPM | SYSTOLIC BLOOD PRESSURE: 95 MMHG

## 2019-02-07 VITALS — DIASTOLIC BLOOD PRESSURE: 79 MMHG | SYSTOLIC BLOOD PRESSURE: 102 MMHG | HEART RATE: 85 BPM

## 2019-02-07 VITALS — RESPIRATION RATE: 14 BRPM | SYSTOLIC BLOOD PRESSURE: 92 MMHG | DIASTOLIC BLOOD PRESSURE: 70 MMHG | HEART RATE: 89 BPM

## 2019-02-07 VITALS — RESPIRATION RATE: 14 BRPM | DIASTOLIC BLOOD PRESSURE: 78 MMHG | HEART RATE: 81 BPM | SYSTOLIC BLOOD PRESSURE: 97 MMHG

## 2019-02-07 VITALS — HEART RATE: 89 BPM | SYSTOLIC BLOOD PRESSURE: 94 MMHG | DIASTOLIC BLOOD PRESSURE: 72 MMHG | RESPIRATION RATE: 14 BRPM

## 2019-02-07 VITALS — RESPIRATION RATE: 14 BRPM | SYSTOLIC BLOOD PRESSURE: 114 MMHG | HEART RATE: 95 BPM | DIASTOLIC BLOOD PRESSURE: 85 MMHG

## 2019-02-07 VITALS — DIASTOLIC BLOOD PRESSURE: 75 MMHG | RESPIRATION RATE: 14 BRPM | HEART RATE: 84 BPM | SYSTOLIC BLOOD PRESSURE: 103 MMHG

## 2019-02-07 VITALS — HEART RATE: 86 BPM | SYSTOLIC BLOOD PRESSURE: 99 MMHG | DIASTOLIC BLOOD PRESSURE: 76 MMHG | RESPIRATION RATE: 13 BRPM

## 2019-02-07 VITALS — SYSTOLIC BLOOD PRESSURE: 95 MMHG | RESPIRATION RATE: 14 BRPM | DIASTOLIC BLOOD PRESSURE: 76 MMHG | HEART RATE: 85 BPM

## 2019-02-07 VITALS — HEART RATE: 95 BPM | SYSTOLIC BLOOD PRESSURE: 102 MMHG | RESPIRATION RATE: 14 BRPM | DIASTOLIC BLOOD PRESSURE: 79 MMHG

## 2019-02-07 VITALS — DIASTOLIC BLOOD PRESSURE: 78 MMHG | HEART RATE: 81 BPM | SYSTOLIC BLOOD PRESSURE: 104 MMHG | RESPIRATION RATE: 14 BRPM

## 2019-02-07 VITALS — SYSTOLIC BLOOD PRESSURE: 90 MMHG | DIASTOLIC BLOOD PRESSURE: 73 MMHG | HEART RATE: 88 BPM | RESPIRATION RATE: 15 BRPM

## 2019-02-07 VITALS — HEART RATE: 87 BPM | RESPIRATION RATE: 14 BRPM | DIASTOLIC BLOOD PRESSURE: 83 MMHG | SYSTOLIC BLOOD PRESSURE: 108 MMHG

## 2019-02-07 VITALS — DIASTOLIC BLOOD PRESSURE: 83 MMHG | HEART RATE: 94 BPM | SYSTOLIC BLOOD PRESSURE: 112 MMHG | RESPIRATION RATE: 14 BRPM

## 2019-02-07 VITALS — DIASTOLIC BLOOD PRESSURE: 84 MMHG | SYSTOLIC BLOOD PRESSURE: 105 MMHG | RESPIRATION RATE: 14 BRPM | HEART RATE: 94 BPM

## 2019-02-07 VITALS — RESPIRATION RATE: 14 BRPM | HEART RATE: 101 BPM | SYSTOLIC BLOOD PRESSURE: 94 MMHG | DIASTOLIC BLOOD PRESSURE: 75 MMHG

## 2019-02-07 VITALS — HEART RATE: 89 BPM | DIASTOLIC BLOOD PRESSURE: 71 MMHG | SYSTOLIC BLOOD PRESSURE: 97 MMHG | RESPIRATION RATE: 12 BRPM

## 2019-02-07 VITALS — HEART RATE: 91 BPM | RESPIRATION RATE: 14 BRPM | SYSTOLIC BLOOD PRESSURE: 108 MMHG | DIASTOLIC BLOOD PRESSURE: 75 MMHG

## 2019-02-07 VITALS — HEART RATE: 100 BPM | SYSTOLIC BLOOD PRESSURE: 105 MMHG | RESPIRATION RATE: 18 BRPM | DIASTOLIC BLOOD PRESSURE: 77 MMHG

## 2019-02-07 VITALS — HEART RATE: 93 BPM | SYSTOLIC BLOOD PRESSURE: 102 MMHG | RESPIRATION RATE: 14 BRPM | DIASTOLIC BLOOD PRESSURE: 73 MMHG

## 2019-02-07 VITALS — RESPIRATION RATE: 10 BRPM | DIASTOLIC BLOOD PRESSURE: 81 MMHG | HEART RATE: 85 BPM | SYSTOLIC BLOOD PRESSURE: 102 MMHG

## 2019-02-07 VITALS — HEART RATE: 81 BPM | DIASTOLIC BLOOD PRESSURE: 74 MMHG | RESPIRATION RATE: 14 BRPM | SYSTOLIC BLOOD PRESSURE: 100 MMHG

## 2019-02-07 VITALS — RESPIRATION RATE: 14 BRPM | SYSTOLIC BLOOD PRESSURE: 103 MMHG | HEART RATE: 82 BPM | DIASTOLIC BLOOD PRESSURE: 78 MMHG

## 2019-02-07 VITALS — DIASTOLIC BLOOD PRESSURE: 81 MMHG | RESPIRATION RATE: 14 BRPM | HEART RATE: 94 BPM | SYSTOLIC BLOOD PRESSURE: 108 MMHG

## 2019-02-07 VITALS — RESPIRATION RATE: 14 BRPM | HEART RATE: 85 BPM | DIASTOLIC BLOOD PRESSURE: 75 MMHG | SYSTOLIC BLOOD PRESSURE: 102 MMHG

## 2019-02-07 VITALS — DIASTOLIC BLOOD PRESSURE: 74 MMHG | RESPIRATION RATE: 14 BRPM | HEART RATE: 95 BPM | SYSTOLIC BLOOD PRESSURE: 97 MMHG

## 2019-02-07 VITALS — SYSTOLIC BLOOD PRESSURE: 95 MMHG | HEART RATE: 85 BPM | DIASTOLIC BLOOD PRESSURE: 72 MMHG

## 2019-02-07 VITALS — RESPIRATION RATE: 14 BRPM | SYSTOLIC BLOOD PRESSURE: 101 MMHG | DIASTOLIC BLOOD PRESSURE: 75 MMHG | HEART RATE: 83 BPM

## 2019-02-07 VITALS — DIASTOLIC BLOOD PRESSURE: 75 MMHG | SYSTOLIC BLOOD PRESSURE: 102 MMHG | HEART RATE: 88 BPM | RESPIRATION RATE: 14 BRPM

## 2019-02-07 VITALS — RESPIRATION RATE: 14 BRPM | SYSTOLIC BLOOD PRESSURE: 112 MMHG | HEART RATE: 94 BPM | DIASTOLIC BLOOD PRESSURE: 83 MMHG

## 2019-02-07 VITALS — SYSTOLIC BLOOD PRESSURE: 97 MMHG | RESPIRATION RATE: 14 BRPM | HEART RATE: 86 BPM | DIASTOLIC BLOOD PRESSURE: 68 MMHG

## 2019-02-07 VITALS — DIASTOLIC BLOOD PRESSURE: 73 MMHG | HEART RATE: 89 BPM | RESPIRATION RATE: 14 BRPM | SYSTOLIC BLOOD PRESSURE: 100 MMHG

## 2019-02-07 VITALS — DIASTOLIC BLOOD PRESSURE: 71 MMHG | RESPIRATION RATE: 14 BRPM | HEART RATE: 88 BPM | SYSTOLIC BLOOD PRESSURE: 95 MMHG

## 2019-02-07 VITALS — SYSTOLIC BLOOD PRESSURE: 88 MMHG | HEART RATE: 104 BPM | DIASTOLIC BLOOD PRESSURE: 69 MMHG | RESPIRATION RATE: 14 BRPM

## 2019-02-07 VITALS — RESPIRATION RATE: 17 BRPM | SYSTOLIC BLOOD PRESSURE: 114 MMHG | HEART RATE: 95 BPM | DIASTOLIC BLOOD PRESSURE: 80 MMHG

## 2019-02-07 VITALS — HEART RATE: 84 BPM | SYSTOLIC BLOOD PRESSURE: 104 MMHG | RESPIRATION RATE: 14 BRPM | DIASTOLIC BLOOD PRESSURE: 80 MMHG

## 2019-02-07 VITALS — SYSTOLIC BLOOD PRESSURE: 108 MMHG | HEART RATE: 93 BPM | DIASTOLIC BLOOD PRESSURE: 82 MMHG | RESPIRATION RATE: 14 BRPM

## 2019-02-07 VITALS — HEART RATE: 84 BPM | SYSTOLIC BLOOD PRESSURE: 97 MMHG | DIASTOLIC BLOOD PRESSURE: 71 MMHG | RESPIRATION RATE: 14 BRPM

## 2019-02-07 VITALS — SYSTOLIC BLOOD PRESSURE: 98 MMHG | HEART RATE: 106 BPM | DIASTOLIC BLOOD PRESSURE: 80 MMHG | RESPIRATION RATE: 14 BRPM

## 2019-02-07 VITALS — RESPIRATION RATE: 14 BRPM | DIASTOLIC BLOOD PRESSURE: 82 MMHG | HEART RATE: 97 BPM | SYSTOLIC BLOOD PRESSURE: 115 MMHG

## 2019-02-07 VITALS — SYSTOLIC BLOOD PRESSURE: 111 MMHG | RESPIRATION RATE: 14 BRPM | HEART RATE: 94 BPM | DIASTOLIC BLOOD PRESSURE: 80 MMHG

## 2019-02-07 VITALS — RESPIRATION RATE: 14 BRPM | SYSTOLIC BLOOD PRESSURE: 98 MMHG | HEART RATE: 84 BPM | DIASTOLIC BLOOD PRESSURE: 74 MMHG

## 2019-02-07 VITALS — HEART RATE: 84 BPM | DIASTOLIC BLOOD PRESSURE: 79 MMHG | SYSTOLIC BLOOD PRESSURE: 105 MMHG | RESPIRATION RATE: 14 BRPM

## 2019-02-07 VITALS — DIASTOLIC BLOOD PRESSURE: 82 MMHG | RESPIRATION RATE: 15 BRPM | HEART RATE: 100 BPM | SYSTOLIC BLOOD PRESSURE: 108 MMHG

## 2019-02-07 LAB
ADD MAN DIFF?: NO
ANION GAP: 10 (ref 5–13)
BASOPHIL #: 0 10^3/UL (ref 0–0.1)
BASOPHILS %: 0.3 % (ref 0–2)
BLOOD UREA NITROGEN: 3 MG/DL (ref 7–20)
CALCIUM: 8.5 MG/DL (ref 8.4–10.2)
CARBON DIOXIDE: 16 MMOL/L (ref 21–31)
CHLORIDE: 110 MMOL/L (ref 97–110)
CREATININE: 0.62 MG/DL (ref 0.44–1)
EOSINOPHILS #: 0.2 10^3/UL (ref 0–0.5)
EOSINOPHILS %: 1.6 % (ref 0–7)
GLUCOSE: 159 MG/DL (ref 70–220)
HEMATOCRIT: 36.3 % (ref 37–47)
HEMOGLOBIN: 12.8 G/DL (ref 12–16)
IMMATURE GRANS #M: 0.04 10^3/UL (ref 0–0.03)
IMMATURE GRANS % (M): 0.3 % (ref 0–0.43)
LYMPHOCYTES #: 1.2 10^3/UL (ref 0.8–2.9)
LYMPHOCYTES %: 10.1 % (ref 15–51)
MEAN CORPUSCULAR HEMOGLOBIN: 32 PG (ref 29–33)
MEAN CORPUSCULAR HGB CONC: 35.3 G/DL (ref 32–37)
MEAN CORPUSCULAR VOLUME: 90.8 FL (ref 82–101)
MEAN PLATELET VOLUME: 8.6 FL (ref 7.4–10.4)
MONOCYTE #: 1.4 10^3/UL (ref 0.3–0.9)
MONOCYTES %: 11.6 % (ref 0–11)
NEUTROPHIL #: 9.1 10^3/UL (ref 1.6–7.5)
NEUTROPHILS %: 76.1 % (ref 39–77)
NUCLEATED RED BLOOD CELLS #: 0 10^3/UL (ref 0–0)
NUCLEATED RED BLOOD CELLS%: 0 /100WBC (ref 0–0)
PHENOBARBITAL: 26.6 MG/L (ref 15–40)
PLATELET COUNT: 257 10^3/UL (ref 140–415)
POTASSIUM: 3.9 MMOL/L (ref 3.5–5.1)
RED BLOOD COUNT: 4 10^6/UL (ref 4.2–5.4)
RED CELL DISTRIBUTION WIDTH: 12.5 % (ref 11.5–14.5)
SODIUM: 136 MMOL/L (ref 135–144)
WHITE BLOOD COUNT: 12 10^3/UL (ref 4.8–10.8)

## 2019-02-07 RX ADMIN — FLUCONAZOLE, SODIUM CHLORIDE 1 MLS/HR: 2 INJECTION INTRAVENOUS at 11:39

## 2019-02-07 RX ADMIN — PHENOBARBITAL SODIUM 1 MG: 65 INJECTION INTRAMUSCULAR; INTRAVENOUS at 20:43

## 2019-02-07 RX ADMIN — PHENOBARBITAL SODIUM 1 MG: 65 INJECTION INTRAMUSCULAR; INTRAVENOUS at 08:43

## 2019-02-07 RX ADMIN — HEPARIN SODIUM SCH UNIT: 5000 INJECTION, SOLUTION INTRAVENOUS; SUBCUTANEOUS at 08:44

## 2019-02-07 RX ADMIN — HEPARIN SODIUM 1 UNIT: 5000 INJECTION, SOLUTION INTRAVENOUS; SUBCUTANEOUS at 08:44

## 2019-02-07 RX ADMIN — CEFTRIAXONE 1 MLS/HR: 1 INJECTION, SOLUTION INTRAVENOUS at 03:32

## 2019-02-07 RX ADMIN — PROPOFOL SCH MLS/HR: 10 INJECTION, EMULSION INTRAVENOUS at 09:00

## 2019-02-07 RX ADMIN — CEFTRIAXONE SCH MLS/HR: 1 INJECTION, SOLUTION INTRAVENOUS at 03:32

## 2019-02-07 RX ADMIN — ASCORBIC ACID, VITAMIN A PALMITATE, CHOLECALCIFEROL, THIAMINE HYDROCHLORIDE, RIBOFLAVIN-5 PHOSPHATE SODIUM, PYRIDOXINE HYDROCHLORIDE, NIACINAMIDE, DEXPANTHENOL, ALPHA-TOCOPHEROL ACETATE, VITAMIN K1, FOLIC ACID, BIOTIN, CYANOCOBALAMIN 1 MLS/HR: 200; 3300; 200; 6; 3.6; 6; 40; 15; 10; 150; 600; 60; 5 INJECTION, SOLUTION INTRAVENOUS at 08:50

## 2019-02-07 RX ADMIN — FOLIC ACID SCH MLS/HR: 5 INJECTION, SOLUTION INTRAMUSCULAR; INTRAVENOUS; SUBCUTANEOUS at 22:32

## 2019-02-07 RX ADMIN — Medication SCH MLS/HR: at 14:09

## 2019-02-07 RX ADMIN — PANTOPRAZOLE SODIUM 1 MG: 40 INJECTION, POWDER, FOR SOLUTION INTRAVENOUS at 06:10

## 2019-02-07 RX ADMIN — FLUCONAZOLE, SODIUM CHLORIDE SCH MLS/HR: 2 INJECTION INTRAVENOUS at 11:39

## 2019-02-07 RX ADMIN — PHENOBARBITAL SODIUM SCH MG: 65 INJECTION INTRAMUSCULAR; INTRAVENOUS at 20:43

## 2019-02-07 RX ADMIN — PANTOPRAZOLE SODIUM SCH MG: 40 INJECTION, POWDER, FOR SOLUTION INTRAVENOUS at 06:10

## 2019-02-07 RX ADMIN — PROPOFOL 1 MLS/HR: 10 INJECTION, EMULSION INTRAVENOUS at 20:44

## 2019-02-07 RX ADMIN — VANCOMYCIN HYDROCHLORIDE 1 MLS/HR: 750 INJECTION, POWDER, LYOPHILIZED, FOR SOLUTION INTRAVENOUS at 16:50

## 2019-02-07 RX ADMIN — HEPARIN SODIUM SCH UNIT: 5000 INJECTION, SOLUTION INTRAVENOUS; SUBCUTANEOUS at 20:44

## 2019-02-07 RX ADMIN — VANCOMYCIN HYDROCHLORIDE SCH MLS/HR: 750 INJECTION, POWDER, LYOPHILIZED, FOR SOLUTION INTRAVENOUS at 16:50

## 2019-02-07 RX ADMIN — FOLIC ACID SCH MLS/HR: 5 INJECTION, SOLUTION INTRAMUSCULAR; INTRAVENOUS; SUBCUTANEOUS at 08:50

## 2019-02-07 RX ADMIN — PHENOBARBITAL SODIUM SCH MG: 65 INJECTION INTRAMUSCULAR; INTRAVENOUS at 08:43

## 2019-02-07 RX ADMIN — ASCORBIC ACID, VITAMIN A PALMITATE, CHOLECALCIFEROL, THIAMINE HYDROCHLORIDE, RIBOFLAVIN-5 PHOSPHATE SODIUM, PYRIDOXINE HYDROCHLORIDE, NIACINAMIDE, DEXPANTHENOL, ALPHA-TOCOPHEROL ACETATE, VITAMIN K1, FOLIC ACID, BIOTIN, CYANOCOBALAMIN 1 MLS/HR: 200; 3300; 200; 6; 3.6; 6; 40; 15; 10; 150; 600; 60; 5 INJECTION, SOLUTION INTRAVENOUS at 22:32

## 2019-02-07 RX ADMIN — HEPARIN SODIUM 1 UNIT: 5000 INJECTION, SOLUTION INTRAVENOUS; SUBCUTANEOUS at 20:44

## 2019-02-07 RX ADMIN — Medication 1 MLS/HR: at 14:09

## 2019-02-07 RX ADMIN — PROPOFOL 1 MLS/HR: 10 INJECTION, EMULSION INTRAVENOUS at 09:00

## 2019-02-07 RX ADMIN — PROPOFOL SCH MLS/HR: 10 INJECTION, EMULSION INTRAVENOUS at 20:44

## 2019-02-07 RX ADMIN — COLLAGENASE SANTYL 1 APPLIC: 250 OINTMENT TOPICAL at 08:47

## 2019-02-07 RX ADMIN — COLLAGENASE SANTYL SCH APPLIC: 250 OINTMENT TOPICAL at 08:47

## 2019-02-07 NOTE — PN
Date/Time of Note


Date/Time of Note


DATE: 2/7/19 


TIME: 08:31





Assessment/Plan


VTE Prophylaxis


Risk score (from Nsg)>0 risk:  8


SCD applied (from Ns):  Yes


Pharmacological prophylaxis:  heparin





Lines/Catheters


IV Catheter Type (from Nrsg):  PICC Line


Central line still needed:  Yes


Urinary Cath still in place:  Yes


Reason Cath still needed:  other (indicate)





Assessment/Plan


Hospital Course


S:  remains intubated and completely unresponsive, now on minimal pressor 


support 





O:


Constitutional:  unresponsive, antiseizures on board but no other sedation, p


upils equal and sluggish 


Head:  atraumatic, normocephalic, NC 


Neck:  non-tender, supple


Respiratory:  coarse


Cardiovascular:  regular rate and rhythm


Gastrointestinal:  S/ NT / ND / +BS


Extremities:  no edema, good radial pulses, frail, 





assessment and Plan:


59 yo F with a slow early onset dementia, cause unknown who is non verbal at 


baseline who was brought in for abnormal jerking behavior concerning for 


seizures 





#Altered mentation with new onset seizures


   -acute on chronic 


   -Brain CT at outside hospital negative per report


   -1st EEG confirms ongoing seizure while on keppra


   -Repeat EEG after phenobarbital therapy should abnormal electroencephalogram 


due to severe diffuse slowing.


   -MRI of the brain with and without contrast showed  chronic  parietal and 


frontal lobe cysts.








#Respiratory failure s/p prophylactic intubation 2/2 encephalopathy


   -appreciate pulm input


   -continue vent support for now








#Sespis 2/2 Nitrite + Urinary tract infection ? now in shock 


   -was not present on admission


   -multiorganism


   -continue empiric abx, continue to monitor cultures





# Chronic dementia/encephalopathy, 


   -nonverbal and bedbound at baseline.  Of gradual onset, no organic cause 


found despite extensive workup per 


   -cared for at home by  and children 24hours





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





#Chronic dyslipidemia





#Chronic hypertension





#Chronic psychiatric d/c on risperdone with benztropine for tardive dyskinesia ?


   -dose of benztropine recently reduced 








Plan :


   -At this time continue ICU monitoring and management


   -Await neurology review and recommendations for today


   -Continue antibiotics and antifungal and follow-up blood as well as repeat 


urine cultures 


   -wean pressors as tolerated 


   -Continue  tube feeds


   -Continue serial labs and close monitoring


   -Further interventions will depend on clinical course


   -POC discussed with nursing staff and consultants 





Care time 40mins


Result Diagram:  


2/7/19 0559                                                                     


          2/7/19 0521





Results 24hrs





Laboratory Tests


        Test
                                2/7/19
05:21  2/7/19
05:59


        Sodium Level                                136


        Potassium Level                             3.9


        Chloride Level                              110


        Carbon Dioxide Level                        16  L


        Anion Gap                                   10  #


        Blood Urea Nitrogen                          3  L


        Creatinine                                 0.62


        Est Glomerular Filtrat Rate
mL/min   > 60  
       



        Glucose Level                               159


        Calcium Level                               8.5


        White Blood Count                                       12.0  H


        Red Blood Count                                         4.00  L


        Hemoglobin                                               12.8


        Hematocrit                                              36.3  L


        Mean Corpuscular Volume                                  90.8


        Mean Corpuscular Hemoglobin                              32.0


        Mean Corpuscular Hemoglobin
Concent  
                  35.3  



        Red Cell Distribution Width                              12.5


        Platelet Count                                            257


        Mean Platelet Volume                                      8.6


        Immature Granulocytes %                                 0.300


        Neutrophils %                                            76.1


        Lymphocytes %                                           10.1  L


        Monocytes %                                             11.6  H


        Eosinophils %                                             1.6


        Basophils %                                               0.3


        Nucleated Red Blood Cells %                               0.0


        Immature Granulocytes #                                0.040  H


        Neutrophils #                                            9.1  H


        Lymphocytes #                                             1.2


        Monocytes #                                              1.4  H


        Eosinophils #                                             0.2


        Basophils #                                               0.0


        Nucleated Red Blood Cells #                               0.0








Exam/Review of Systems


Exam


Vitals





Vital Signs


  Date      Temp  Pulse  Resp  B/P (MAP)   Pulse Ox  O2          O2 Flow    FiO2


Time                                                 Delivery    Rate


    2/7/19           91    14      108/75       100


     18:15                           (86)


    2/7/19  97.6


     16:00


    2/7/19                                           Mechanical


     15:45                                           Ventilator


    2/7/19                                                                    30


     15:20


    2/5/19                                                             2.0


     09:01








Intake and Output





2/6/19 2/6/19 2/7/19





1515:00


23:00


07:00





IntakeIntake Total


748.75 ml


950.00 ml


1070.00 ml





OutputOutput Total


420 ml


440 ml


490 ml





BalanceBalance


328.75 ml


510.00 ml


580.00 ml














Results


Results 24hrs





Laboratory Tests


        Test
                                2/7/19
05:21  2/7/19
05:59


        Sodium Level                                136


        Potassium Level                             3.9


        Chloride Level                              110


        Carbon Dioxide Level                        16  L


        Anion Gap                                   10  #


        Blood Urea Nitrogen                          3  L


        Creatinine                                 0.62


        Est Glomerular Filtrat Rate
mL/min   > 60  
       



        Glucose Level                               159


        Calcium Level                               8.5


        White Blood Count                                       12.0  H


        Red Blood Count                                         4.00  L


        Hemoglobin                                               12.8


        Hematocrit                                              36.3  L


        Mean Corpuscular Volume                                  90.8


        Mean Corpuscular Hemoglobin                              32.0


        Mean Corpuscular Hemoglobin
Concent  
                  35.3  



        Red Cell Distribution Width                              12.5


        Platelet Count                                            257


        Mean Platelet Volume                                      8.6


        Immature Granulocytes %                                 0.300


        Neutrophils %                                            76.1


        Lymphocytes %                                           10.1  L


        Monocytes %                                             11.6  H


        Eosinophils %                                             1.6


        Basophils %                                               0.3


        Nucleated Red Blood Cells %                               0.0


        Immature Granulocytes #                                0.040  H


        Neutrophils #                                            9.1  H


        Lymphocytes #                                             1.2


        Monocytes #                                              1.4  H


        Eosinophils #                                             0.2


        Basophils #                                               0.0


        Nucleated Red Blood Cells #                               0.0








Medications


Medication





Current Medications


IV Flush (NS 3 ml) 3 ml PER PROTOCOL IV ;  Start 2/4/19 at 03:30


Ondansetron HCl (Zofran Inj) 4 mg Q6H  PRN IV NAUSEA/VOMITING;  Start 2/4/19 at 


03:30


Acetaminophen (Tylenol Tab) 650 mg Q6H  PRN PO .PAIN 1-3 OR TEMP;  Start 2/4/19 


at 03:30


Acetaminophen/ Hydrocodone Bitart (Norco (5/325)) 1 tab Q6H  PRN PO .MOD PAIN 4-


6;  Start 2/4/19 at 03:30


Morphine Sulfate (morphine SULFATE (PF)) 2 mg Q4H  PRN IV .SEVERE PAIN 7-10;  


Start 2/4/19 at 03:30


Docusate Sodium (Colace) 100 mg Q12H  PRN PO .CONSTIPATION;  Start 2/4/19 at 


03:30


Magnesium Hydroxide (Milk Of Mag) 30 ml DAILY  PRN PO .CONSTIPATION;  Start 


2/4/19 at 03:30


Pantoprazole (Protonix Iv) 40 mg DAILY@06 IV  Last administered on 2/7/19at 06:


10; Admin Dose 40 MG;  Start 2/4/19 at 06:00


Heparin Sodium (Porcine) (Heparin  (5000 Units/1ml)) 5,000 unit Q12 SC  Last 


administered on 2/7/19at 08:44; Admin Dose 5,000 UNIT;  Start 2/4/19 at 09:00


Albuterol/ Ipratropium (Duoneb) 3 ml Q4H RESP THERAPY  PRN HHN SHORTNESS OF 


BREATH;  Start 2/4/19 at 03:30


Hydralazine HCl (Apresoline) 10 mg Q6H  PRN IV ELEVATED BLOOD PRESSURE;  Start 


2/4/19 at 03:30


Ceftriaxone Sodium 50 ml @  100 mls/hr Q24H IVPB  Last administered on 2/7/19at 


03:32; Admin Dose 100 MLS/HR;  Start 2/4/19 at 03:30


Nitroglycerin (Nitroglycerin (Sl Tab) 0.4 Mg) 1 tab Q5M  PRN SL ANGINA;  Start 


2/4/19 at 03:30


Lorazepam (Ativan) 1 mg Q2H  PRN IV SEIZURES Last administered on 2/4/19at 


03:57; Admin Dose 1 MG;  Start 2/4/19 at 03:30


Miscellaneous Information (Pending Santyl Order For Wound Care) This patient 


ha... PRN  PRN XX WOUND CARE;  Start 2/4/19 at 05:00


Dextrose/Sodium Chloride 1,000 ml @  75 mls/hr [id number] IV  Last administered on 


2/7/19at 08:50; Admin Dose 75 MLS/HR;  Start 2/5/19 at 17:00


Propofol 100 ml @  1.227 mls/ hr Q12H IV ;  Start 2/5/19 at 21:00


Norepinephrine 250 ml @  1.875 mls/ hr TITRATE IV  Last administered on 2/7/19at


14:09; Admin Dose 15 MLS/HR;  Start 2/5/19 at 23:00


Fluconazole 100 ml @  100 mls/hr Q24H IVPB  Last administered on 2/7/19at 11:39;


Admin Dose 100 MLS/HR;  Start 2/6/19 at 12:00


Collagenase (Santyl) 1 applic DAILY TOP  Last administered on 2/7/19at 08:47; 


Admin Dose 1 APPLIC;  Start 2/6/19 at 11:30


IV Flush (NS 10 ml) 10 ml PRN  PRN IV IV PROTOCOL;  Start 2/6/19 at 15:00


Phenobarbital (Luminal) 30 mg Q12 IV  Last administered on 2/7/19at 08:43; Admin


Dose 30 MG;  Start 2/6/19 at 15:00


Vancomycin HCl (Vanco Iv Per Pharmacy) VANCOMYCIN PER PHARMACY PER PROTOCOL XX ;


 Start 2/7/19 at 17:00


Vancomycin/Sodium Chloride 250 ml @  125 mls/hr Q24H IVPB  Last administered on 


2/7/19at 16:50; Admin Dose 125 MLS/HR;  Start 2/7/19 at 17:00











TEJAS SALMERON                 Feb 7, 2019 18:35

## 2019-02-07 NOTE — CONS
Assessment/Plan


Assessment/Plan


Hospital Course


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 


p/w ams.


She was additionally reported to have a seizure... for which neurology is co


nsulted.





Most clinically consistent with new onset epilepsy.





Initial EEG was notable for GPEDs and an electrographic seizure originating in 


the R temporal region..


s/p Dilantin load, with ongoing episodic staring spells (presumed seizures) 


noted clinically..


s/p Phenobarbital load on 2/5..with resolution of episodes suspicious for 


seizure





MRI is unrevealing. 





P:


Cont phb 30 mg bid maintenance for now..


Repeat EEG to evaluate for further epileptiform activity


Hold Keppra and Dilantin for now


Limit other sedating medications where possible


Other medical management per primary


Will follow clinically





Consultation Date/Type/Reason


Admit Date/Time


Feb 3, 2019 at 22:12


Type of Consult


Neurology


Reason for Consultation


new onset seizures


Requesting Provider:  LAURENCE CASTELLANO


Date/Time of Note


DATE: 2/7/19 


TIME: 15:10





24 HR Interval Summary


Free Text/Dictation


Continues critical care. No changes in pt condition reported. No staring spells 


reported today.


Subjective hx not possible:  pt non-verbal, pt critical





Exam


Vital Signs


Vitals





Vital Signs


  Date      Temp  Pulse  Resp  B/P (MAP)   Pulse Ox  O2          O2 Flow    FiO2


Time                                                 Delivery    Rate


    2/7/19          101    14  99/74 (82)       100  Mechanical


     14:15                                           Ventilator


    2/7/19  97.5


     12:00


    2/7/19                                                                    30


     08:00


    2/5/19                                                             2.0


     09:01








Intake and Output





2/6/19 2/6/19 2/7/19





1515:00


23:00


07:00





IntakeIntake Total


748.75 ml


950.00 ml


1070.00 ml





OutputOutput Total


420 ml


440 ml


490 ml





BalanceBalance


328.75 ml


510.00 ml


580.00 ml














Exam


PE:


Gen Appearance: No Apparent Distress


HEENT: Intubated


Cardiovascular: Regular rate


Abdomen: Soft


Extremities: Dry





NE:


The patient was comatose.





Cranial nerve examination was limited by mental status. Pupils were equal and 


reactive to light. There was no afferent pupillary defect. Funduscopic 


examination was limited. Face was grossly symmetric, w/ present corneal and 


cough reflexes.





Tone was normal. Muscle bulk was diminished. I did not see fasciculations. The 


patient minimally withdrew to noxious stimulation in her LE.





Coordination and gait testing was limited by mental status.





Arm and leg reflexes were within normal limits and symmetric. Richmond's sign was


absent. Plantar responses were flexor.











JULIETA SINGH NP           Feb 7, 2019 15:10

## 2019-02-07 NOTE — CONS
Assessment/Plan


Assessment/Plan


Hospital Course (Demo Recall)


No acute events overnight patient is intubated sedated on Levophed drip in no 


distress she is hypothermic with a temperature of 97.5.  WBC 12 H&H 12.8 and 


36.3 platelets 257 neutrophils 76.1 BUN 30 creatinine 0.62





Microbiology: Blood culture grew gram-positive cocci urine culture remains nega


tive sputum culture pending





Indwelling: Endotracheal tube orogastric tube Davila catheter left upper 


extremity PICC line





Antimicrobials: Fluconazole Rocephin





Chest x-ray revealed clear lungs





Physical examination: Well-developed chronically ill-appearing middle-aged woman


who is intubated in no distress.  Head atraumatic normocephalic.  Sclera 


nonicteric.  Neck is supple.  Chest rise symmetrical, breath sounds diminished 


bases.  Heart: S1-S2.  Abdomen soft bowel sounds present, hypoactive.  Ex


tremities cyanotic with trace edema





Assessment:


1.  Septic shock


2.  Gram-positive cocci bacteremia rule out contaminant


3.  Respiratory failure


4.  New onset seizures


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


5.  Chronic encephalopathy





Plan: Add vancomycin, send sputum culture, continue present care, neurology and 


pulmonary recommendations





Consultation Date/Type/Reason


Admit Date/Time


Feb 3, 2019 at 22:12


Initial Consult Date


2/5/19


Type of Consult


id


Requesting Provider:  LAURENCE CASTELLANO


Date/Time of Note


DATE: 2/7/19 


TIME: 13:29





Exam/Review of Systems


Exam


Vitals





Vital Signs


  Date      Temp  Pulse  Resp  B/P (MAP)   Pulse Ox  O2          O2 Flow    FiO2


Time                                                 Delivery    Rate


    2/7/19          101    14      119/86       100  Mechanical


     13:00                           (97)            Ventilator


    2/7/19  97.5


     12:00


    2/7/19                                                                    30


     08:00


    2/5/19                                                             2.0


     09:01








Intake and Output





2/6/19 2/6/19 2/7/19





1515:00


23:00


07:00





IntakeIntake Total


748.75 ml


950.00 ml


1070.00 ml





OutputOutput Total


420 ml


440 ml


490 ml





BalanceBalance


328.75 ml


510.00 ml


580.00 ml














Results


Result Diagram:  


2/7/19 0559                                                                     


          2/7/19 0521





Results 24hrs





Laboratory Tests


        Test
                                2/7/19
05:21  2/7/19
05:59


        Sodium Level                                136


        Potassium Level                             3.9


        Chloride Level                              110


        Carbon Dioxide Level                        16  L


        Anion Gap                                   10  #


        Blood Urea Nitrogen                          3  L


        Creatinine                                 0.62


        Est Glomerular Filtrat Rate
mL/min   > 60  
       



        Glucose Level                               159


        Calcium Level                               8.5


        White Blood Count                                       12.0  H


        Red Blood Count                                         4.00  L


        Hemoglobin                                               12.8


        Hematocrit                                              36.3  L


        Mean Corpuscular Volume                                  90.8


        Mean Corpuscular Hemoglobin                              32.0


        Mean Corpuscular Hemoglobin
Concent  
                  35.3  



        Red Cell Distribution Width                              12.5


        Platelet Count                                            257


        Mean Platelet Volume                                      8.6


        Immature Granulocytes %                                 0.300


        Neutrophils %                                            76.1


        Lymphocytes %                                           10.1  L


        Monocytes %                                             11.6  H


        Eosinophils %                                             1.6


        Basophils %                                               0.3


        Nucleated Red Blood Cells %                               0.0


        Immature Granulocytes #                                0.040  H


        Neutrophils #                                            9.1  H


        Lymphocytes #                                             1.2


        Monocytes #                                              1.4  H


        Eosinophils #                                             0.2


        Basophils #                                               0.0


        Nucleated Red Blood Cells #                               0.0








Medications


Medication





Current Medications


IV Flush (NS 3 ml) 3 ml PER PROTOCOL IV ;  Start 2/4/19 at 03:30


Ondansetron HCl (Zofran Inj) 4 mg Q6H  PRN IV NAUSEA/VOMITING;  Start 2/4/19 at 


03:30


Acetaminophen (Tylenol Tab) 650 mg Q6H  PRN PO .PAIN 1-3 OR TEMP;  Start 2/4/19 


at 03:30


Acetaminophen/ Hydrocodone Bitart (Norco (5/325)) 1 tab Q6H  PRN PO .MOD PAIN 4-


6;  Start 2/4/19 at 03:30


Morphine Sulfate (morphine SULFATE (PF)) 2 mg Q4H  PRN IV .SEVERE PAIN 7-10;  


Start 2/4/19 at 03:30


Docusate Sodium (Colace) 100 mg Q12H  PRN PO .CONSTIPATION;  Start 2/4/19 at 


03:30


Magnesium Hydroxide (Milk Of Mag) 30 ml DAILY  PRN PO .CONSTIPATION;  Start 


2/4/19 at 03:30


Pantoprazole (Protonix Iv) 40 mg DAILY@06 IV  Last administered on 2/7/19at 


06:10; Admin Dose 40 MG;  Start 2/4/19 at 06:00


Heparin Sodium (Porcine) (Heparin  (5000 Units/1ml)) 5,000 unit Q12 SC  Last 


administered on 2/7/19at 08:44; Admin Dose 5,000 UNIT;  Start 2/4/19 at 09:00


Albuterol/ Ipratropium (Duoneb) 3 ml Q4H RESP THERAPY  PRN HHN SHORTNESS OF 


BREATH;  Start 2/4/19 at 03:30


Hydralazine HCl (Apresoline) 10 mg Q6H  PRN IV ELEVATED BLOOD PRESSURE;  Start 


2/4/19 at 03:30


Ceftriaxone Sodium 50 ml @  100 mls/hr Q24H IVPB  Last administered on 2/7/19at 


03:32; Admin Dose 100 MLS/HR;  Start 2/4/19 at 03:30


Nitroglycerin (Nitroglycerin (Sl Tab) 0.4 Mg) 1 tab Q5M  PRN SL ANGINA;  Start 


2/4/19 at 03:30


Lorazepam (Ativan) 1 mg Q2H  PRN IV SEIZURES Last administered on 2/4/19at 


03:57; Admin Dose 1 MG;  Start 2/4/19 at 03:30


Miscellaneous Information (Pending Santyl Order For Wound Care) This patient 


ha... PRN  PRN XX WOUND CARE;  Start 2/4/19 at 05:00


Dextrose/Sodium Chloride 1,000 ml @  75 mls/hr [id number] IV  Last administered on 


2/7/19at 08:50; Admin Dose 75 MLS/HR;  Start 2/5/19 at 17:00


Propofol 100 ml @  1.227 mls/ hr Q12H IV ;  Start 2/5/19 at 21:00


Norepinephrine 250 ml @  1.875 mls/ hr TITRATE IV  Last administered on 2/6/19at


16:03; Admin Dose 11.25 MLS/HR;  Start 2/5/19 at 23:00


Fluconazole 100 ml @  100 mls/hr Q24H IVPB  Last administered on 2/7/19at 11:39;


Admin Dose 100 MLS/HR;  Start 2/6/19 at 12:00


Collagenase (Santyl) 1 applic DAILY TOP  Last administered on 2/7/19at 08:47; 


Admin Dose 1 APPLIC;  Start 2/6/19 at 11:30


IV Flush (NS 10 ml) 10 ml PRN  PRN IV IV PROTOCOL;  Start 2/6/19 at 15:00


Phenobarbital (Luminal) 30 mg Q12 IV  Last administered on 2/7/19at 08:43; Admin


Dose 30 MG;  Start 2/6/19 at 15:00











RUKHSANA ZEPEDA NP             Feb 7, 2019 13:29

## 2019-02-07 NOTE — CONS
Consult Date/Type/Reason


Admit Date/Time


Feb 3, 2019 at 22:12


Initial Consult Date


2/5/19


Type of Consult


Pulmonary


Requesting Provider:  LAURENCE CASTELLANO


Date/Time of Note


DATE: 2/7/19 


TIME: 11:03





Subjective


Patient remained stable this morning.  No new events unresponsive on mechanical 


ventilation





Objective





Vital Signs


  Date      Temp  Pulse  Resp  B/P (MAP)   Pulse Ox  O2          O2 Flow    FiO2


Time                                                 Delivery    Rate


    2/7/19  97.0     81    14      100/76       100  Mechanical


     08:00                           (84)            Ventilator


    2/7/19                                                                    30


     08:00


    2/5/19                                                             2.0


     09:01








Intake and Output





2/6/19 2/6/19 2/7/19





1515:00


23:00


07:00





IntakeIntake Total


748.75 ml


950.00 ml


1040.00 ml





OutputOutput Total


420 ml


440 ml


390 ml





BalanceBalance


328.75 ml


510.00 ml


650.00 ml











Exam


 


PHYSICAL EXAMINATION:


GENERAL:  Elderly, chronically ill-appearing lady, orally intubated on 


mechanical ventilation.


VITAL SIGNS: 


NECK:  Supple.  No JVD, lymphadenopathy.


CARDIAC:  S1, S2, no added sounds or murmurs.


CHEST:  Diminished air entry bilaterally.


ABDOMEN:  Soft, nontender.  No guarding or rebound.


EXTREMITIES:  No cyanosis, clubbing, or edema.


NEUROLOGIC:  Unable to assess, currently sedated.





Vent Setting


Ventilator Support Mode:  AC


Fraction of Inspired Oxygen pe:  30


Positive End Expiratory Pressu:  5.0





Results/Medications


Result Diagram:  


2/7/19 0559                                                                     


          2/7/19 0521





Results 24 hrs





Laboratory Tests


        Test
                                2/7/19
05:21  2/7/19
05:59


        Sodium Level                                136


        Potassium Level                             3.9


        Chloride Level                              110


        Carbon Dioxide Level                        16  L


        Anion Gap                                   10  #


        Blood Urea Nitrogen                          3  L


        Creatinine                                 0.62


        Est Glomerular Filtrat Rate
mL/min   > 60  
       



        Glucose Level                               159


        Calcium Level                               8.5


        White Blood Count                                       12.0  H


        Red Blood Count                                         4.00  L


        Hemoglobin                                               12.8


        Hematocrit                                              36.3  L


        Mean Corpuscular Volume                                  90.8


        Mean Corpuscular Hemoglobin                              32.0


        Mean Corpuscular Hemoglobin
Concent  
                  35.3  



        Red Cell Distribution Width                              12.5


        Platelet Count                                            257


        Mean Platelet Volume                                      8.6


        Immature Granulocytes %                                 0.300


        Neutrophils %                                            76.1


        Lymphocytes %                                           10.1  L


        Monocytes %                                             11.6  H


        Eosinophils %                                             1.6


        Basophils %                                               0.3


        Nucleated Red Blood Cells %                               0.0


        Immature Granulocytes #                                0.040  H


        Neutrophils #                                            9.1  H


        Lymphocytes #                                             1.2


        Monocytes #                                              1.4  H


        Eosinophils #                                             0.2


        Basophils #                                               0.0


        Nucleated Red Blood Cells #                               0.0





Medications





Current Medications


IV Flush (NS 3 ml) 3 ml PER PROTOCOL IV ;  Start 2/4/19 at 03:30


Ondansetron HCl (Zofran Inj) 4 mg Q6H  PRN IV NAUSEA/VOMITING;  Start 2/4/19 at 


03:30


Acetaminophen (Tylenol Tab) 650 mg Q6H  PRN PO .PAIN 1-3 OR TEMP;  Start 2/4/19 


at 03:30


Acetaminophen/ Hydrocodone Bitart (Norco (5/325)) 1 tab Q6H  PRN PO .MOD PAIN 4-


6;  Start 2/4/19 at 03:30


Morphine Sulfate (morphine SULFATE (PF)) 2 mg Q4H  PRN IV .SEVERE PAIN 7-10;  


Start 2/4/19 at 03:30


Docusate Sodium (Colace) 100 mg Q12H  PRN PO .CONSTIPATION;  Start 2/4/19 at 


03:30


Magnesium Hydroxide (Milk Of Mag) 30 ml DAILY  PRN PO .CONSTIPATION;  Start 


2/4/19 at 03:30


Pantoprazole (Protonix Iv) 40 mg DAILY@06 IV  Last administered on 2/7/19at 


06:10; Admin Dose 40 MG;  Start 2/4/19 at 06:00


Heparin Sodium (Porcine) (Heparin  (5000 Units/1ml)) 5,000 unit Q12 SC  Last 


administered on 2/7/19at 08:44; Admin Dose 5,000 UNIT;  Start 2/4/19 at 09:00


Albuterol/ Ipratropium (Duoneb) 3 ml Q4H RESP THERAPY  PRN HHN SHORTNESS OF 


BREATH;  Start 2/4/19 at 03:30


Hydralazine HCl (Apresoline) 10 mg Q6H  PRN IV ELEVATED BLOOD PRESSURE;  Start 


2/4/19 at 03:30


Ceftriaxone Sodium 50 ml @  100 mls/hr Q24H IVPB  Last administered on 2/7/19at 


03:32; Admin Dose 100 MLS/HR;  Start 2/4/19 at 03:30


Nitroglycerin (Nitroglycerin (Sl Tab) 0.4 Mg) 1 tab Q5M  PRN SL ANGINA;  Start 


2/4/19 at 03:30


Lorazepam (Ativan) 1 mg Q2H  PRN IV SEIZURES Last administered on 2/4/19at 


03:57; Admin Dose 1 MG;  Start 2/4/19 at 03:30


Miscellaneous Information (Pending Santyl Order For Wound Care) This patient 


ha... PRN  PRN XX WOUND CARE;  Start 2/4/19 at 05:00


Dextrose/Sodium Chloride 1,000 ml @  75 mls/hr [id number] IV  Last administered on 


2/7/19at 08:50; Admin Dose 75 MLS/HR;  Start 2/5/19 at 17:00


Propofol 100 ml @  1.227 mls/ hr Q12H IV ;  Start 2/5/19 at 21:00


Norepinephrine 250 ml @  1.875 mls/ hr TITRATE IV  Last administered on 2/6/19at


16:03; Admin Dose 11.25 MLS/HR;  Start 2/5/19 at 23:00


Fluconazole 100 ml @  100 mls/hr Q24H IVPB  Last administered on 2/6/19at 12:25;


Admin Dose 100 MLS/HR;  Start 2/6/19 at 12:00


Collagenase (Santyl) 1 applic DAILY TOP  Last administered on 2/7/19at 08:47; 


Admin Dose 1 APPLIC;  Start 2/6/19 at 11:30


IV Flush (NS 10 ml) 10 ml PRN  PRN IV IV PROTOCOL;  Start 2/6/19 at 15:00


Phenobarbital (Luminal) 30 mg Q12 IV  Last administered on 2/7/19at 08:43; Admin


Dose 30 MG;  Start 2/6/19 at 15:00





Assessment/Plan


Hospital Course (Demo Recall)





IMPRESSION 


1.  Further seizure with altered mental status requiring intubation for airway 


protection.


2.  Metabolic acidosis likely secondary to hypoperfusion.


3.  Significant lactic acidosis with evidence of septic shock given hypotension 


and vasopressor support.


 


Plan


1.  Continue fluid resuscitation.


2.  Intravenous antibiotics pending culture results.


3.  Continue antiepileptics.


4.  EEG evaluation and neurology recommendations. MRI noted. 


5.  DVT and GI prophylaxis.





family conference re goals of care. 


cc 40mins.











RIC CAROLINA MD, [facility]      Feb 7, 2019 11:06

## 2019-02-08 VITALS — HEART RATE: 85 BPM | DIASTOLIC BLOOD PRESSURE: 85 MMHG | SYSTOLIC BLOOD PRESSURE: 100 MMHG | RESPIRATION RATE: 15 BRPM

## 2019-02-08 VITALS — HEART RATE: 85 BPM | RESPIRATION RATE: 14 BRPM | SYSTOLIC BLOOD PRESSURE: 98 MMHG | DIASTOLIC BLOOD PRESSURE: 71 MMHG

## 2019-02-08 VITALS — DIASTOLIC BLOOD PRESSURE: 70 MMHG | SYSTOLIC BLOOD PRESSURE: 98 MMHG | HEART RATE: 83 BPM | RESPIRATION RATE: 15 BRPM

## 2019-02-08 VITALS — RESPIRATION RATE: 14 BRPM | HEART RATE: 92 BPM | SYSTOLIC BLOOD PRESSURE: 100 MMHG | DIASTOLIC BLOOD PRESSURE: 78 MMHG

## 2019-02-08 VITALS — SYSTOLIC BLOOD PRESSURE: 90 MMHG | HEART RATE: 82 BPM | DIASTOLIC BLOOD PRESSURE: 67 MMHG | RESPIRATION RATE: 14 BRPM

## 2019-02-08 VITALS — DIASTOLIC BLOOD PRESSURE: 71 MMHG | HEART RATE: 86 BPM | RESPIRATION RATE: 14 BRPM | SYSTOLIC BLOOD PRESSURE: 100 MMHG

## 2019-02-08 VITALS — DIASTOLIC BLOOD PRESSURE: 71 MMHG | HEART RATE: 80 BPM | SYSTOLIC BLOOD PRESSURE: 92 MMHG | RESPIRATION RATE: 14 BRPM

## 2019-02-08 VITALS — SYSTOLIC BLOOD PRESSURE: 114 MMHG | RESPIRATION RATE: 14 BRPM | HEART RATE: 91 BPM | DIASTOLIC BLOOD PRESSURE: 81 MMHG

## 2019-02-08 VITALS — SYSTOLIC BLOOD PRESSURE: 97 MMHG | HEART RATE: 84 BPM | RESPIRATION RATE: 14 BRPM | DIASTOLIC BLOOD PRESSURE: 76 MMHG

## 2019-02-08 VITALS — DIASTOLIC BLOOD PRESSURE: 62 MMHG | RESPIRATION RATE: 15 BRPM | SYSTOLIC BLOOD PRESSURE: 85 MMHG | HEART RATE: 83 BPM

## 2019-02-08 VITALS — SYSTOLIC BLOOD PRESSURE: 99 MMHG | RESPIRATION RATE: 14 BRPM | DIASTOLIC BLOOD PRESSURE: 72 MMHG | HEART RATE: 84 BPM

## 2019-02-08 VITALS — HEART RATE: 82 BPM | SYSTOLIC BLOOD PRESSURE: 92 MMHG | RESPIRATION RATE: 14 BRPM | DIASTOLIC BLOOD PRESSURE: 70 MMHG

## 2019-02-08 VITALS — HEART RATE: 84 BPM | DIASTOLIC BLOOD PRESSURE: 71 MMHG | SYSTOLIC BLOOD PRESSURE: 92 MMHG | RESPIRATION RATE: 14 BRPM

## 2019-02-08 VITALS — HEART RATE: 86 BPM | RESPIRATION RATE: 14 BRPM | DIASTOLIC BLOOD PRESSURE: 70 MMHG | SYSTOLIC BLOOD PRESSURE: 94 MMHG

## 2019-02-08 VITALS — SYSTOLIC BLOOD PRESSURE: 100 MMHG | HEART RATE: 90 BPM | RESPIRATION RATE: 15 BRPM | DIASTOLIC BLOOD PRESSURE: 76 MMHG

## 2019-02-08 VITALS — RESPIRATION RATE: 14 BRPM | DIASTOLIC BLOOD PRESSURE: 73 MMHG | SYSTOLIC BLOOD PRESSURE: 93 MMHG | HEART RATE: 90 BPM

## 2019-02-08 VITALS — DIASTOLIC BLOOD PRESSURE: 65 MMHG | RESPIRATION RATE: 14 BRPM | SYSTOLIC BLOOD PRESSURE: 85 MMHG | HEART RATE: 86 BPM

## 2019-02-08 VITALS — SYSTOLIC BLOOD PRESSURE: 89 MMHG | HEART RATE: 86 BPM | DIASTOLIC BLOOD PRESSURE: 66 MMHG | RESPIRATION RATE: 14 BRPM

## 2019-02-08 VITALS — DIASTOLIC BLOOD PRESSURE: 71 MMHG | SYSTOLIC BLOOD PRESSURE: 100 MMHG | HEART RATE: 87 BPM | RESPIRATION RATE: 14 BRPM

## 2019-02-08 VITALS — HEART RATE: 85 BPM | SYSTOLIC BLOOD PRESSURE: 100 MMHG | DIASTOLIC BLOOD PRESSURE: 74 MMHG | RESPIRATION RATE: 15 BRPM

## 2019-02-08 VITALS — SYSTOLIC BLOOD PRESSURE: 88 MMHG | DIASTOLIC BLOOD PRESSURE: 67 MMHG | RESPIRATION RATE: 14 BRPM | HEART RATE: 85 BPM

## 2019-02-08 VITALS — SYSTOLIC BLOOD PRESSURE: 96 MMHG | DIASTOLIC BLOOD PRESSURE: 69 MMHG | RESPIRATION RATE: 15 BRPM | HEART RATE: 85 BPM

## 2019-02-08 VITALS — RESPIRATION RATE: 14 BRPM

## 2019-02-08 VITALS — HEART RATE: 92 BPM | RESPIRATION RATE: 14 BRPM | DIASTOLIC BLOOD PRESSURE: 76 MMHG | SYSTOLIC BLOOD PRESSURE: 100 MMHG

## 2019-02-08 VITALS — SYSTOLIC BLOOD PRESSURE: 110 MMHG | HEART RATE: 84 BPM | DIASTOLIC BLOOD PRESSURE: 79 MMHG | RESPIRATION RATE: 14 BRPM

## 2019-02-08 VITALS — RESPIRATION RATE: 15 BRPM | HEART RATE: 82 BPM | DIASTOLIC BLOOD PRESSURE: 69 MMHG | SYSTOLIC BLOOD PRESSURE: 100 MMHG

## 2019-02-08 VITALS — HEART RATE: 85 BPM | RESPIRATION RATE: 14 BRPM | SYSTOLIC BLOOD PRESSURE: 92 MMHG | DIASTOLIC BLOOD PRESSURE: 69 MMHG

## 2019-02-08 VITALS — DIASTOLIC BLOOD PRESSURE: 78 MMHG | HEART RATE: 86 BPM | RESPIRATION RATE: 14 BRPM | SYSTOLIC BLOOD PRESSURE: 105 MMHG

## 2019-02-08 VITALS — RESPIRATION RATE: 14 BRPM | DIASTOLIC BLOOD PRESSURE: 79 MMHG | HEART RATE: 88 BPM | SYSTOLIC BLOOD PRESSURE: 112 MMHG

## 2019-02-08 VITALS — DIASTOLIC BLOOD PRESSURE: 73 MMHG | SYSTOLIC BLOOD PRESSURE: 104 MMHG | HEART RATE: 86 BPM | RESPIRATION RATE: 14 BRPM

## 2019-02-08 VITALS — SYSTOLIC BLOOD PRESSURE: 92 MMHG | RESPIRATION RATE: 14 BRPM | HEART RATE: 83 BPM | DIASTOLIC BLOOD PRESSURE: 72 MMHG

## 2019-02-08 VITALS — DIASTOLIC BLOOD PRESSURE: 72 MMHG | HEART RATE: 87 BPM | RESPIRATION RATE: 15 BRPM | SYSTOLIC BLOOD PRESSURE: 93 MMHG

## 2019-02-08 VITALS — DIASTOLIC BLOOD PRESSURE: 78 MMHG | RESPIRATION RATE: 14 BRPM | HEART RATE: 88 BPM | SYSTOLIC BLOOD PRESSURE: 98 MMHG

## 2019-02-08 VITALS — HEART RATE: 87 BPM | SYSTOLIC BLOOD PRESSURE: 101 MMHG | DIASTOLIC BLOOD PRESSURE: 78 MMHG | RESPIRATION RATE: 14 BRPM

## 2019-02-08 VITALS — DIASTOLIC BLOOD PRESSURE: 69 MMHG | RESPIRATION RATE: 16 BRPM | HEART RATE: 85 BPM | SYSTOLIC BLOOD PRESSURE: 94 MMHG

## 2019-02-08 VITALS — SYSTOLIC BLOOD PRESSURE: 96 MMHG | HEART RATE: 84 BPM | RESPIRATION RATE: 15 BRPM | DIASTOLIC BLOOD PRESSURE: 65 MMHG

## 2019-02-08 VITALS — RESPIRATION RATE: 15 BRPM | DIASTOLIC BLOOD PRESSURE: 73 MMHG | SYSTOLIC BLOOD PRESSURE: 98 MMHG | HEART RATE: 85 BPM

## 2019-02-08 VITALS — SYSTOLIC BLOOD PRESSURE: 117 MMHG | DIASTOLIC BLOOD PRESSURE: 87 MMHG | HEART RATE: 94 BPM | RESPIRATION RATE: 14 BRPM

## 2019-02-08 VITALS — SYSTOLIC BLOOD PRESSURE: 109 MMHG | HEART RATE: 86 BPM | DIASTOLIC BLOOD PRESSURE: 78 MMHG | RESPIRATION RATE: 14 BRPM

## 2019-02-08 VITALS — SYSTOLIC BLOOD PRESSURE: 120 MMHG | DIASTOLIC BLOOD PRESSURE: 80 MMHG | HEART RATE: 92 BPM | RESPIRATION RATE: 14 BRPM

## 2019-02-08 VITALS — DIASTOLIC BLOOD PRESSURE: 71 MMHG | HEART RATE: 84 BPM | SYSTOLIC BLOOD PRESSURE: 97 MMHG | RESPIRATION RATE: 15 BRPM

## 2019-02-08 VITALS — DIASTOLIC BLOOD PRESSURE: 66 MMHG | HEART RATE: 84 BPM | SYSTOLIC BLOOD PRESSURE: 93 MMHG | RESPIRATION RATE: 14 BRPM

## 2019-02-08 VITALS — HEART RATE: 81 BPM | SYSTOLIC BLOOD PRESSURE: 109 MMHG | RESPIRATION RATE: 14 BRPM | DIASTOLIC BLOOD PRESSURE: 83 MMHG

## 2019-02-08 VITALS — DIASTOLIC BLOOD PRESSURE: 70 MMHG | HEART RATE: 87 BPM | RESPIRATION RATE: 14 BRPM | SYSTOLIC BLOOD PRESSURE: 94 MMHG

## 2019-02-08 VITALS — HEART RATE: 89 BPM | RESPIRATION RATE: 14 BRPM | DIASTOLIC BLOOD PRESSURE: 81 MMHG | SYSTOLIC BLOOD PRESSURE: 105 MMHG

## 2019-02-08 VITALS — DIASTOLIC BLOOD PRESSURE: 74 MMHG | SYSTOLIC BLOOD PRESSURE: 103 MMHG | HEART RATE: 86 BPM | RESPIRATION RATE: 14 BRPM

## 2019-02-08 VITALS — RESPIRATION RATE: 19 BRPM | HEART RATE: 90 BPM | DIASTOLIC BLOOD PRESSURE: 67 MMHG | SYSTOLIC BLOOD PRESSURE: 88 MMHG

## 2019-02-08 VITALS — SYSTOLIC BLOOD PRESSURE: 97 MMHG | HEART RATE: 86 BPM | DIASTOLIC BLOOD PRESSURE: 66 MMHG | RESPIRATION RATE: 14 BRPM

## 2019-02-08 VITALS — RESPIRATION RATE: 14 BRPM | SYSTOLIC BLOOD PRESSURE: 109 MMHG | HEART RATE: 85 BPM | DIASTOLIC BLOOD PRESSURE: 77 MMHG

## 2019-02-08 VITALS — HEART RATE: 92 BPM | SYSTOLIC BLOOD PRESSURE: 116 MMHG | RESPIRATION RATE: 14 BRPM | DIASTOLIC BLOOD PRESSURE: 85 MMHG

## 2019-02-08 VITALS — RESPIRATION RATE: 14 BRPM | HEART RATE: 93 BPM | DIASTOLIC BLOOD PRESSURE: 86 MMHG | SYSTOLIC BLOOD PRESSURE: 118 MMHG

## 2019-02-08 VITALS — RESPIRATION RATE: 14 BRPM | SYSTOLIC BLOOD PRESSURE: 98 MMHG | DIASTOLIC BLOOD PRESSURE: 72 MMHG | HEART RATE: 84 BPM

## 2019-02-08 VITALS — HEART RATE: 81 BPM | SYSTOLIC BLOOD PRESSURE: 94 MMHG | DIASTOLIC BLOOD PRESSURE: 68 MMHG | RESPIRATION RATE: 15 BRPM

## 2019-02-08 VITALS — RESPIRATION RATE: 14 BRPM | HEART RATE: 84 BPM | DIASTOLIC BLOOD PRESSURE: 64 MMHG | SYSTOLIC BLOOD PRESSURE: 90 MMHG

## 2019-02-08 VITALS — HEART RATE: 82 BPM | DIASTOLIC BLOOD PRESSURE: 72 MMHG | SYSTOLIC BLOOD PRESSURE: 96 MMHG | RESPIRATION RATE: 14 BRPM

## 2019-02-08 VITALS — RESPIRATION RATE: 14 BRPM | DIASTOLIC BLOOD PRESSURE: 68 MMHG | SYSTOLIC BLOOD PRESSURE: 88 MMHG | HEART RATE: 87 BPM

## 2019-02-08 VITALS — DIASTOLIC BLOOD PRESSURE: 70 MMHG | SYSTOLIC BLOOD PRESSURE: 87 MMHG | RESPIRATION RATE: 14 BRPM | HEART RATE: 84 BPM

## 2019-02-08 VITALS — HEART RATE: 85 BPM | DIASTOLIC BLOOD PRESSURE: 78 MMHG | SYSTOLIC BLOOD PRESSURE: 105 MMHG | RESPIRATION RATE: 14 BRPM

## 2019-02-08 VITALS — HEART RATE: 89 BPM | SYSTOLIC BLOOD PRESSURE: 100 MMHG | RESPIRATION RATE: 14 BRPM | DIASTOLIC BLOOD PRESSURE: 75 MMHG

## 2019-02-08 VITALS — DIASTOLIC BLOOD PRESSURE: 72 MMHG | RESPIRATION RATE: 14 BRPM | HEART RATE: 86 BPM | SYSTOLIC BLOOD PRESSURE: 102 MMHG

## 2019-02-08 VITALS — DIASTOLIC BLOOD PRESSURE: 69 MMHG | SYSTOLIC BLOOD PRESSURE: 91 MMHG | HEART RATE: 83 BPM | RESPIRATION RATE: 14 BRPM

## 2019-02-08 VITALS — RESPIRATION RATE: 14 BRPM | DIASTOLIC BLOOD PRESSURE: 78 MMHG | HEART RATE: 87 BPM | SYSTOLIC BLOOD PRESSURE: 98 MMHG

## 2019-02-08 VITALS — SYSTOLIC BLOOD PRESSURE: 99 MMHG | HEART RATE: 85 BPM | RESPIRATION RATE: 16 BRPM | DIASTOLIC BLOOD PRESSURE: 76 MMHG

## 2019-02-08 VITALS — SYSTOLIC BLOOD PRESSURE: 103 MMHG | DIASTOLIC BLOOD PRESSURE: 77 MMHG | HEART RATE: 83 BPM | RESPIRATION RATE: 14 BRPM

## 2019-02-08 VITALS — DIASTOLIC BLOOD PRESSURE: 80 MMHG | RESPIRATION RATE: 14 BRPM | HEART RATE: 88 BPM | SYSTOLIC BLOOD PRESSURE: 108 MMHG

## 2019-02-08 VITALS — DIASTOLIC BLOOD PRESSURE: 69 MMHG | RESPIRATION RATE: 14 BRPM | HEART RATE: 85 BPM | SYSTOLIC BLOOD PRESSURE: 89 MMHG

## 2019-02-08 VITALS — RESPIRATION RATE: 14 BRPM | DIASTOLIC BLOOD PRESSURE: 67 MMHG | SYSTOLIC BLOOD PRESSURE: 88 MMHG | HEART RATE: 86 BPM

## 2019-02-08 VITALS — DIASTOLIC BLOOD PRESSURE: 73 MMHG | SYSTOLIC BLOOD PRESSURE: 98 MMHG | RESPIRATION RATE: 14 BRPM | HEART RATE: 86 BPM

## 2019-02-08 VITALS — RESPIRATION RATE: 14 BRPM | DIASTOLIC BLOOD PRESSURE: 71 MMHG | HEART RATE: 89 BPM | SYSTOLIC BLOOD PRESSURE: 92 MMHG

## 2019-02-08 VITALS — RESPIRATION RATE: 15 BRPM

## 2019-02-08 VITALS — SYSTOLIC BLOOD PRESSURE: 93 MMHG | RESPIRATION RATE: 14 BRPM | HEART RATE: 87 BPM | DIASTOLIC BLOOD PRESSURE: 74 MMHG

## 2019-02-08 VITALS — DIASTOLIC BLOOD PRESSURE: 73 MMHG | SYSTOLIC BLOOD PRESSURE: 95 MMHG | HEART RATE: 83 BPM | RESPIRATION RATE: 14 BRPM

## 2019-02-08 VITALS — SYSTOLIC BLOOD PRESSURE: 92 MMHG | HEART RATE: 88 BPM | RESPIRATION RATE: 17 BRPM | DIASTOLIC BLOOD PRESSURE: 71 MMHG

## 2019-02-08 VITALS — HEART RATE: 92 BPM | SYSTOLIC BLOOD PRESSURE: 78 MMHG | RESPIRATION RATE: 14 BRPM | DIASTOLIC BLOOD PRESSURE: 64 MMHG

## 2019-02-08 VITALS — RESPIRATION RATE: 14 BRPM | SYSTOLIC BLOOD PRESSURE: 96 MMHG | DIASTOLIC BLOOD PRESSURE: 69 MMHG | HEART RATE: 83 BPM

## 2019-02-08 VITALS — SYSTOLIC BLOOD PRESSURE: 97 MMHG | DIASTOLIC BLOOD PRESSURE: 69 MMHG | HEART RATE: 85 BPM | RESPIRATION RATE: 15 BRPM

## 2019-02-08 VITALS — DIASTOLIC BLOOD PRESSURE: 76 MMHG | SYSTOLIC BLOOD PRESSURE: 97 MMHG | HEART RATE: 85 BPM | RESPIRATION RATE: 14 BRPM

## 2019-02-08 VITALS — RESPIRATION RATE: 14 BRPM | SYSTOLIC BLOOD PRESSURE: 95 MMHG | HEART RATE: 80 BPM | DIASTOLIC BLOOD PRESSURE: 67 MMHG

## 2019-02-08 VITALS — HEART RATE: 83 BPM | SYSTOLIC BLOOD PRESSURE: 90 MMHG | DIASTOLIC BLOOD PRESSURE: 66 MMHG | RESPIRATION RATE: 14 BRPM

## 2019-02-08 VITALS — DIASTOLIC BLOOD PRESSURE: 73 MMHG | SYSTOLIC BLOOD PRESSURE: 95 MMHG | RESPIRATION RATE: 14 BRPM | HEART RATE: 86 BPM

## 2019-02-08 VITALS — RESPIRATION RATE: 14 BRPM | SYSTOLIC BLOOD PRESSURE: 103 MMHG | DIASTOLIC BLOOD PRESSURE: 80 MMHG | HEART RATE: 84 BPM

## 2019-02-08 VITALS — HEART RATE: 91 BPM | DIASTOLIC BLOOD PRESSURE: 85 MMHG | SYSTOLIC BLOOD PRESSURE: 114 MMHG | RESPIRATION RATE: 14 BRPM

## 2019-02-08 VITALS — HEART RATE: 87 BPM | RESPIRATION RATE: 14 BRPM | DIASTOLIC BLOOD PRESSURE: 75 MMHG | SYSTOLIC BLOOD PRESSURE: 90 MMHG

## 2019-02-08 VITALS — RESPIRATION RATE: 15 BRPM | DIASTOLIC BLOOD PRESSURE: 72 MMHG | HEART RATE: 81 BPM | SYSTOLIC BLOOD PRESSURE: 96 MMHG

## 2019-02-08 VITALS — HEART RATE: 90 BPM | RESPIRATION RATE: 14 BRPM | DIASTOLIC BLOOD PRESSURE: 80 MMHG | SYSTOLIC BLOOD PRESSURE: 102 MMHG

## 2019-02-08 VITALS — DIASTOLIC BLOOD PRESSURE: 66 MMHG | RESPIRATION RATE: 14 BRPM | SYSTOLIC BLOOD PRESSURE: 89 MMHG | HEART RATE: 83 BPM

## 2019-02-08 VITALS — DIASTOLIC BLOOD PRESSURE: 71 MMHG | HEART RATE: 86 BPM | RESPIRATION RATE: 14 BRPM | SYSTOLIC BLOOD PRESSURE: 94 MMHG

## 2019-02-08 VITALS — RESPIRATION RATE: 14 BRPM | HEART RATE: 88 BPM | SYSTOLIC BLOOD PRESSURE: 98 MMHG | DIASTOLIC BLOOD PRESSURE: 75 MMHG

## 2019-02-08 VITALS — SYSTOLIC BLOOD PRESSURE: 100 MMHG | HEART RATE: 86 BPM | DIASTOLIC BLOOD PRESSURE: 73 MMHG | RESPIRATION RATE: 15 BRPM

## 2019-02-08 VITALS — DIASTOLIC BLOOD PRESSURE: 119 MMHG | SYSTOLIC BLOOD PRESSURE: 163 MMHG | HEART RATE: 100 BPM | RESPIRATION RATE: 14 BRPM

## 2019-02-08 VITALS — SYSTOLIC BLOOD PRESSURE: 108 MMHG | RESPIRATION RATE: 14 BRPM | HEART RATE: 85 BPM | DIASTOLIC BLOOD PRESSURE: 75 MMHG

## 2019-02-08 VITALS — RESPIRATION RATE: 8 BRPM | HEART RATE: 93 BPM

## 2019-02-08 VITALS — SYSTOLIC BLOOD PRESSURE: 111 MMHG | RESPIRATION RATE: 14 BRPM | DIASTOLIC BLOOD PRESSURE: 77 MMHG | HEART RATE: 85 BPM

## 2019-02-08 VITALS — RESPIRATION RATE: 14 BRPM | DIASTOLIC BLOOD PRESSURE: 66 MMHG | HEART RATE: 83 BPM | SYSTOLIC BLOOD PRESSURE: 91 MMHG

## 2019-02-08 LAB
ADD MAN DIFF?: NO
ANION GAP: 4 (ref 5–13)
BASOPHIL #: 0.1 10^3/UL (ref 0–0.1)
BASOPHILS %: 0.5 % (ref 0–2)
BLOOD UREA NITROGEN: 6 MG/DL (ref 7–20)
CALCIUM: 7.8 MG/DL (ref 8.4–10.2)
CARBON DIOXIDE: 24 MMOL/L (ref 21–31)
CHLORIDE: 111 MMOL/L (ref 97–110)
CREATININE: 0.58 MG/DL (ref 0.44–1)
EOSINOPHILS #: 0.3 10^3/UL (ref 0–0.5)
EOSINOPHILS %: 3 % (ref 0–7)
GLUCOSE: 148 MG/DL (ref 70–220)
HEMATOCRIT: 31.8 % (ref 37–47)
HEMOGLOBIN: 11.1 G/DL (ref 12–16)
IMMATURE GRANS #M: 0.05 10^3/UL (ref 0–0.03)
IMMATURE GRANS % (M): 0.4 % (ref 0–0.43)
LYMPHOCYTES #: 1.2 10^3/UL (ref 0.8–2.9)
LYMPHOCYTES %: 10.5 % (ref 15–51)
MAGNESIUM: 1.5 MG/DL (ref 1.7–2.5)
MEAN CORPUSCULAR HEMOGLOBIN: 31.3 PG (ref 29–33)
MEAN CORPUSCULAR HGB CONC: 34.9 G/DL (ref 32–37)
MEAN CORPUSCULAR VOLUME: 89.6 FL (ref 82–101)
MEAN PLATELET VOLUME: 10 FL (ref 7.4–10.4)
MONOCYTE #: 1 10^3/UL (ref 0.3–0.9)
MONOCYTES %: 9.2 % (ref 0–11)
NEUTROPHIL #: 8.5 10^3/UL (ref 1.6–7.5)
NEUTROPHILS %: 76.4 % (ref 39–77)
NUCLEATED RED BLOOD CELLS #: 0 10^3/UL (ref 0–0)
NUCLEATED RED BLOOD CELLS%: 0 /100WBC (ref 0–0)
PHOSPHORUS: 1.8 MG/DL (ref 2.5–4.9)
PLATELET COUNT: 166 10^3/UL (ref 140–415)
POTASSIUM: 2.6 MMOL/L (ref 3.5–5.1)
RED BLOOD COUNT: 3.55 10^6/UL (ref 4.2–5.4)
RED CELL DISTRIBUTION WIDTH: 12.6 % (ref 11.5–14.5)
SODIUM: 139 MMOL/L (ref 135–144)
WHITE BLOOD COUNT: 11.2 10^3/UL (ref 4.8–10.8)

## 2019-02-08 RX ADMIN — PROPOFOL SCH MLS/HR: 10 INJECTION, EMULSION INTRAVENOUS at 19:37

## 2019-02-08 RX ADMIN — COLLAGENASE SANTYL 1 APPLIC: 250 OINTMENT TOPICAL at 08:32

## 2019-02-08 RX ADMIN — COLLAGENASE SANTYL SCH APPLIC: 250 OINTMENT TOPICAL at 08:32

## 2019-02-08 RX ADMIN — HEPARIN SODIUM SCH UNIT: 5000 INJECTION, SOLUTION INTRAVENOUS; SUBCUTANEOUS at 08:32

## 2019-02-08 RX ADMIN — FLUCONAZOLE, SODIUM CHLORIDE 1 MLS/HR: 2 INJECTION INTRAVENOUS at 12:36

## 2019-02-08 RX ADMIN — PANTOPRAZOLE SODIUM SCH MG: 40 INJECTION, POWDER, FOR SOLUTION INTRAVENOUS at 05:12

## 2019-02-08 RX ADMIN — POTASSIUM CHLORIDE SCH MLS/HR: 200 INJECTION, SOLUTION INTRAVENOUS at 05:54

## 2019-02-08 RX ADMIN — Medication SCH MLS/HR: at 15:22

## 2019-02-08 RX ADMIN — HEPARIN SODIUM SCH UNIT: 5000 INJECTION, SOLUTION INTRAVENOUS; SUBCUTANEOUS at 21:07

## 2019-02-08 RX ADMIN — FOLIC ACID SCH MLS/HR: 5 INJECTION, SOLUTION INTRAMUSCULAR; INTRAVENOUS; SUBCUTANEOUS at 11:46

## 2019-02-08 RX ADMIN — PROPOFOL 1 MLS/HR: 10 INJECTION, EMULSION INTRAVENOUS at 09:00

## 2019-02-08 RX ADMIN — POTASSIUM CHLORIDE 1 MLS/HR: 200 INJECTION, SOLUTION INTRAVENOUS at 05:54

## 2019-02-08 RX ADMIN — PROPOFOL 1 MLS/HR: 10 INJECTION, EMULSION INTRAVENOUS at 19:37

## 2019-02-08 RX ADMIN — VANCOMYCIN HYDROCHLORIDE 1 MLS/HR: 750 INJECTION, POWDER, LYOPHILIZED, FOR SOLUTION INTRAVENOUS at 16:44

## 2019-02-08 RX ADMIN — CEFEPIME 1 MLS/HR: 1 INJECTION, POWDER, FOR SOLUTION INTRAMUSCULAR; INTRAVENOUS at 13:46

## 2019-02-08 RX ADMIN — HEPARIN SODIUM 1 UNIT: 5000 INJECTION, SOLUTION INTRAVENOUS; SUBCUTANEOUS at 08:32

## 2019-02-08 RX ADMIN — PANTOPRAZOLE SODIUM 1 MG: 40 INJECTION, POWDER, FOR SOLUTION INTRAVENOUS at 05:12

## 2019-02-08 RX ADMIN — POTASSIUM CHLORIDE 1 MLS/HR: 200 INJECTION, SOLUTION INTRAVENOUS at 09:50

## 2019-02-08 RX ADMIN — CEFEPIME 1 MLS/HR: 1 INJECTION, POWDER, FOR SOLUTION INTRAMUSCULAR; INTRAVENOUS at 21:06

## 2019-02-08 RX ADMIN — Medication 1 MLS/HR: at 15:22

## 2019-02-08 RX ADMIN — ASCORBIC ACID, VITAMIN A PALMITATE, CHOLECALCIFEROL, THIAMINE HYDROCHLORIDE, RIBOFLAVIN-5 PHOSPHATE SODIUM, PYRIDOXINE HYDROCHLORIDE, NIACINAMIDE, DEXPANTHENOL, ALPHA-TOCOPHEROL ACETATE, VITAMIN K1, FOLIC ACID, BIOTIN, CYANOCOBALAMIN 1 MLS/HR: 200; 3300; 200; 6; 3.6; 6; 40; 15; 10; 150; 600; 60; 5 INJECTION, SOLUTION INTRAVENOUS at 11:46

## 2019-02-08 RX ADMIN — POTASSIUM CHLORIDE 1 MLS/HR: 200 INJECTION, SOLUTION INTRAVENOUS at 07:46

## 2019-02-08 RX ADMIN — POTASSIUM CHLORIDE SCH MLS/HR: 200 INJECTION, SOLUTION INTRAVENOUS at 07:46

## 2019-02-08 RX ADMIN — POTASSIUM CHLORIDE SCH MLS/HR: 200 INJECTION, SOLUTION INTRAVENOUS at 09:50

## 2019-02-08 RX ADMIN — HEPARIN SODIUM 1 UNIT: 5000 INJECTION, SOLUTION INTRAVENOUS; SUBCUTANEOUS at 21:07

## 2019-02-08 RX ADMIN — CEFEPIME SCH MLS/HR: 1 INJECTION, POWDER, FOR SOLUTION INTRAMUSCULAR; INTRAVENOUS at 21:06

## 2019-02-08 RX ADMIN — CEFTRIAXONE 1 MLS/HR: 1 INJECTION, SOLUTION INTRAVENOUS at 02:56

## 2019-02-08 RX ADMIN — PHENOBARBITAL SODIUM SCH MG: 65 INJECTION INTRAMUSCULAR; INTRAVENOUS at 08:32

## 2019-02-08 RX ADMIN — CEFTRIAXONE SCH MLS/HR: 1 INJECTION, SOLUTION INTRAVENOUS at 02:56

## 2019-02-08 RX ADMIN — MAGNESIUM SULFATE HEPTAHYDRATE 1 MLS/HR: 500 INJECTION, SOLUTION INTRAMUSCULAR; INTRAVENOUS at 05:54

## 2019-02-08 RX ADMIN — VANCOMYCIN HYDROCHLORIDE SCH MLS/HR: 750 INJECTION, POWDER, LYOPHILIZED, FOR SOLUTION INTRAVENOUS at 16:44

## 2019-02-08 RX ADMIN — PROPOFOL SCH MLS/HR: 10 INJECTION, EMULSION INTRAVENOUS at 09:00

## 2019-02-08 RX ADMIN — FLUCONAZOLE, SODIUM CHLORIDE SCH MLS/HR: 2 INJECTION INTRAVENOUS at 12:36

## 2019-02-08 RX ADMIN — PHENOBARBITAL SODIUM 1 MG: 65 INJECTION INTRAMUSCULAR; INTRAVENOUS at 08:32

## 2019-02-08 RX ADMIN — CEFEPIME SCH MLS/HR: 1 INJECTION, POWDER, FOR SOLUTION INTRAMUSCULAR; INTRAVENOUS at 13:46

## 2019-02-08 NOTE — PN
Date/Time of Note


Date/Time of Note


DATE: 2/8/19 


TIME: 16:39





Assessment/Plan


VTE Prophylaxis


Risk score (from Nsg)>0 risk:  5


SCD applied (from Nsg):  Yes


Pharmacological prophylaxis:  heparin





Lines/Catheters


IV Catheter Type (from Nrsg):  PICC Line


Central line still needed:  Yes


Urinary Cath still in place:  Yes


Reason Cath still needed:  other (indicate)





Assessment/Plan


Hospital Course


S:  remains intubated and completely unresponsive, and  still on minimal pressor


support 





O:


Constitutional:  barely responsive, frail, chronically ill looking


Head:  atraumatic, normocephalic, NC 


Neck:  non-tender, supple


Respiratory:  coarse, diminished 


Cardiovascular:  regular rate and rhythm


Gastrointestinal:  S/ NT / ND / +BS


Extremities:  no edema, good radial pulses, frail, 





assessment and Plan:


57 yo F with a slow early onset dementia, cause unknown who is non verbal at [facility] who was brought in for abnormal jerking behavior concerning for seizures 





# Seizure requiring intubation for airway protection


   -continue vent support for now





#Sespis shock with aspiration PNA and UTI 








# Chronic dementia/encephalopathy, 


   -nonverbal and bedbound at baseline.  Of gradual onset, no organic cause 


found despite extensive workup per 


   -cared for at home by  and children 24hours





# Remote History of West Nile virus infection





#Chronic dyslipidemia





#Chronic hypertension: currently hypotensive





#Chronic psychiatric d/c on risperdone with benztropine for tardive dyskinesia ?


   -dose of benztropine recently reduced 


   all pysch meds on hold for now 








Plan :


   -At this time continue ICU monitoring and management


   -Patient has been having serial EEGS, last EEG showed no further seizure, all


antiseizure and sedating meds have been stopped at this time, await clinical 


improvement of mental status 


   -Continue antibiotics and antifungal  and wean pressors as tolerated 


   -Continue  tube feeds


   -Continue serial labs and close monitoring


   -Further interventions will depend on clinical course


   -POC discussed with nursing staff and consultants 


   - was also called via telephone and given update





Care time 40mins


Result Diagram:  


2/8/19 0445                                                                     


          2/8/19 0445





Results 24hrs





Laboratory Tests


               Test
                                2/8/19
04:45


               White Blood Count                         11.2  H


               Red Blood Count                           3.55  L


               Hemoglobin                                11.1  L


               Hematocrit                                31.8  L


               Mean Corpuscular Volume                    89.6


               Mean Corpuscular Hemoglobin                31.3


               Mean Corpuscular Hemoglobin
Concent       34.9  



               Red Cell Distribution Width                12.6


               Platelet Count                             166  #


               Mean Platelet Volume                       10.0


               Immature Granulocytes %                   0.400


               Neutrophils %                              76.4


               Lymphocytes %                             10.5  L


               Monocytes %                                 9.2


               Eosinophils %                               3.0


               Basophils %                                 0.5


               Nucleated Red Blood Cells %                 0.0


               Immature Granulocytes #                  0.050  H


               Neutrophils #                              8.5  H


               Lymphocytes #                               1.2


               Monocytes #                                1.0  H


               Eosinophils #                               0.3


               Basophils #                                 0.1


               Nucleated Red Blood Cells #                 0.0


               Sodium Level                                139


               Potassium Level                           2.6  *L


               Chloride Level                             111  H


               Carbon Dioxide Level                         24


               Anion Gap                                    4  L


               Blood Urea Nitrogen                          6  L


               Creatinine                                 0.58


               Est Glomerular Filtrat Rate
mL/min   > 60  



               Glucose Level                               148


               Calcium Level                              7.8  L


               Phosphorus Level                           1.8  L


               Magnesium Level                            1.5  L








Exam/Review of Systems


Exam


Vitals





Vital Signs


  Date      Temp  Pulse  Resp  B/P (MAP)   Pulse Ox  O2          O2 Flow    FiO2


Time                                                 Delivery    Rate


    2/8/19           88    14      112/79       100


     14:30                           (90)


    2/8/19                                           Mechanical


     14:00                                           Ventilator


    2/8/19  97.8


     12:00


    2/8/19                                                                    30


     08:00


    2/5/19                                                             2.0


     09:01








Intake and Output





2/7/19 2/7/19 2/8/19





1414:59


22:59


06:59





IntakeIntake Total


1155.50 ml


1465.00 ml


1281.25 ml





OutputOutput Total


1130 ml


1665 ml


1235 ml





BalanceBalance


25.50 ml


-200.00 ml


46.25 ml














Results


Results 24hrs





Laboratory Tests


               Test
                                2/8/19
04:45


               White Blood Count                         11.2  H


               Red Blood Count                           3.55  L


               Hemoglobin                                11.1  L


               Hematocrit                                31.8  L


               Mean Corpuscular Volume                    89.6


               Mean Corpuscular Hemoglobin                31.3


               Mean Corpuscular Hemoglobin
Concent       34.9  



               Red Cell Distribution Width                12.6


               Platelet Count                             166  #


               Mean Platelet Volume                       10.0


               Immature Granulocytes %                   0.400


               Neutrophils %                              76.4


               Lymphocytes %                             10.5  L


               Monocytes %                                 9.2


               Eosinophils %                               3.0


               Basophils %                                 0.5


               Nucleated Red Blood Cells %                 0.0


               Immature Granulocytes #                  0.050  H


               Neutrophils #                              8.5  H


               Lymphocytes #                               1.2


               Monocytes #                                1.0  H


               Eosinophils #                               0.3


               Basophils #                                 0.1


               Nucleated Red Blood Cells #                 0.0


               Sodium Level                                139


               Potassium Level                           2.6  *L


               Chloride Level                             111  H


               Carbon Dioxide Level                         24


               Anion Gap                                    4  L


               Blood Urea Nitrogen                          6  L


               Creatinine                                 0.58


               Est Glomerular Filtrat Rate
mL/min   > 60  



               Glucose Level                               148


               Calcium Level                              7.8  L


               Phosphorus Level                           1.8  L


               Magnesium Level                            1.5  L








Medications


Medication





Current Medications


IV Flush (NS 3 ml) 3 ml PER PROTOCOL IV ;  Start 2/4/19 at 03:30


Ondansetron HCl (Zofran Inj) 4 mg Q6H  PRN IV NAUSEA/VOMITING;  Start 2/4/19 at 


03:30


Acetaminophen (Tylenol Tab) 650 mg Q6H  PRN PO .PAIN 1-3 OR TEMP;  Start 2/4/19 


at 03:30


Acetaminophen/ Hydrocodone Bitart (Norco (5/325)) 1 tab Q6H  PRN PO .MOD PAIN 4-


6;  Start 2/4/19 at 03:30


Docusate Sodium (Colace) 100 mg Q12H  PRN PO .CONSTIPATION;  Start 2/4/19 at 


03:30


Magnesium Hydroxide (Milk Of Mag) 30 ml DAILY  PRN PO .CONSTIPATION;  Start 2/4/ 19 at 03:30


Pantoprazole (Protonix Iv) 40 mg DAILY@06 IV  Last administered on 2/8/19at 


05:12; Admin Dose 40 MG;  Start 2/4/19 at 06:00


Heparin Sodium (Porcine) (Heparin  (5000 Units/1ml)) 5,000 unit Q12 SC  Last 


administered on 2/8/19at 08:32; Admin Dose 5,000 UNIT;  Start 2/4/19 at 09:00


Albuterol/ Ipratropium (Duoneb) 3 ml Q4H RESP THERAPY  PRN HHN SHORTNESS OF 


BREATH;  Start 2/4/19 at 03:30


Hydralazine HCl (Apresoline) 10 mg Q6H  PRN IV ELEVATED BLOOD PRESSURE;  Start 


2/4/19 at 03:30


Nitroglycerin (Nitroglycerin (Sl Tab) 0.4 Mg) 1 tab Q5M  PRN SL ANGINA;  Start 


2/4/19 at 03:30


Lorazepam (Ativan) 1 mg Q2H  PRN IV SEIZURES Last administered on 2/4/19at 


03:57; Admin Dose 1 MG;  Start 2/4/19 at 03:30


Miscellaneous Information (Pending Santyl Order For Wound Care) This patient 


ha... PRN  PRN XX WOUND CARE;  Start 2/4/19 at 05:00


Dextrose/Sodium Chloride 1,000 ml @  75 mls/hr [id number] IV  Last administered on 


2/8/19at 11:46; Admin Dose 75 MLS/HR;  Start 2/5/19 at 17:00


Propofol 100 ml @  1.227 mls/ hr Q12H IV ;  Start 2/5/19 at 21:00


Norepinephrine 250 ml @  1.875 mls/ hr TITRATE IV  Last administered on 2/8/19at


15:22; Admin Dose 7.5 MLS/HR;  Start 2/5/19 at 23:00


Fluconazole 100 ml @  100 mls/hr Q24H IVPB  Last administered on 2/8/19at 12:36;


Admin Dose 100 MLS/HR;  Start 2/6/19 at 12:00


Collagenase (Santyl) 1 applic DAILY TOP  Last administered on 2/8/19at 08:32; 


Admin Dose 1 APPLIC;  Start 2/6/19 at 11:30


IV Flush (NS 10 ml) 10 ml PRN  PRN IV IV PROTOCOL;  Start 2/6/19 at 15:00


Vancomycin HCl (Vanco Iv Per Pharmacy) VANCOMYCIN PER PHARMACY PER PROTOCOL XX ;


 Start 2/7/19 at 17:00


Vancomycin/Sodium Chloride 250 ml @  125 mls/hr Q24H IVPB  Last administered on 


2/7/19at 16:50; Admin Dose 125 MLS/HR;  Start 2/7/19 at 17:00


Morphine Sulfate (morphine) 2 mg Q4H  PRN IV .SEVERE PAIN 7-10;  Start 2/8/19 at


11:30


Miscellaneous Information (*Rx Drug Level Order Reminder*) VANCOMYCIN TROUGH 2/9


AT 1600 ONCE  ONCE XX ;  Start 2/9/19 at 16:00;  Stop 2/9/19 at 16:01


Cefepime HCl 50 ml @  100 mls/hr Q12 IVPB  Last administered on 2/8/19at 13:46; 


Admin Dose 100 MLS/HR;  Start 2/8/19 at 13:00











TEJAS SALMERON                 Feb 8, 2019 16:40

## 2019-02-08 NOTE — CONS
Assessment/Plan


Assessment/Plan


Hospital Course (Demo Recall)


No acute events overnight patient remains intubated on Levophed drip, no fevers 


WBC 11.2 platelets 166 no shift no bands BUN 6 creatinine 0.58





Microbiology: Blood culture growing staph species, urine culture negative, 


sputum culture growing gram-negative rods





Antimicrobials: Vancomycin fluconazole Rocephin





Indwelling: Endotracheal tube orogastric tube Davila catheter left upper 


extremity PICC line





Physical examination: Well-developed chronically ill-appearing middle-aged woman


who is intubated in no distress.  Head atraumatic normocephalic.  Sclera 


nonicteric.  Neck is supple.  Chest rise symmetrical, breath sounds diminished 


bases.  Heart: S1-S2.  Abdomen soft bowel sounds present, hypoactive.  


Extremities cyanotic with trace edema





Assessment:


1.  Septic shock


2.  Gram-positive cocci bacteremia cw contaminant


3.  Respiratory failure


4.  New onset seizures


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


5.  Chronic encephalopathy





Plan: Clinically unchanged, we will will change Rocephin to cefepime, await for 


final cultures, continue present care, neurology and pulmonary recommendations





Consultation Date/Type/Reason


Admit Date/Time


Feb 3, 2019 at 22:12


Initial Consult Date


2/5/19


Type of Consult


id


Requesting Provider:  LAURENEC CASTELLANO


Date/Time of Note


DATE: 2/8/19 


TIME: 12:26





Exam/Review of Systems


Exam


Vitals





Vital Signs


  Date      Temp  Pulse  Resp  B/P (MAP)   Pulse Ox  O2          O2 Flow    FiO2


Time                                                 Delivery    Rate


    2/8/19           84    14  98/72 (81)       100


     11:45


    2/8/19                                           Mechanical


     11:00                                           Ventilator


    2/8/19  97.3


     08:00


    2/8/19                                                                    30


     08:00


    2/5/19                                                             2.0


     09:01








Intake and Output





2/7/19 2/7/19 2/8/19





1515:00


23:00


07:00





IntakeIntake Total


1155.50 ml


1471.25 ml


1202.50 ml





OutputOutput Total


1180 ml


1550 ml


1400 ml





BalanceBalance


-24.50 ml


-78.75 ml


-197.50 ml














Results


Result Diagram:  


2/8/19 0445                                                                     


          2/8/19 0445





Results 24hrs





Laboratory Tests


               Test
                                2/8/19
04:45


               White Blood Count                         11.2  H


               Red Blood Count                           3.55  L


               Hemoglobin                                11.1  L


               Hematocrit                                31.8  L


               Mean Corpuscular Volume                    89.6


               Mean Corpuscular Hemoglobin                31.3


               Mean Corpuscular Hemoglobin
Concent       34.9  



               Red Cell Distribution Width                12.6


               Platelet Count                             166  #


               Mean Platelet Volume                       10.0


               Immature Granulocytes %                   0.400


               Neutrophils %                              76.4


               Lymphocytes %                             10.5  L


               Monocytes %                                 9.2


               Eosinophils %                               3.0


               Basophils %                                 0.5


               Nucleated Red Blood Cells %                 0.0


               Immature Granulocytes #                  0.050  H


               Neutrophils #                              8.5  H


               Lymphocytes #                               1.2


               Monocytes #                                1.0  H


               Eosinophils #                               0.3


               Basophils #                                 0.1


               Nucleated Red Blood Cells #                 0.0


               Sodium Level                                139


               Potassium Level                           2.6  *L


               Chloride Level                             111  H


               Carbon Dioxide Level                         24


               Anion Gap                                    4  L


               Blood Urea Nitrogen                          6  L


               Creatinine                                 0.58


               Est Glomerular Filtrat Rate
mL/min   > 60  



               Glucose Level                               148


               Calcium Level                              7.8  L


               Phosphorus Level                           1.8  L


               Magnesium Level                            1.5  L








Medications


Medication





Current Medications


IV Flush (NS 3 ml) 3 ml PER PROTOCOL IV ;  Start 2/4/19 at 03:30


Ondansetron HCl (Zofran Inj) 4 mg Q6H  PRN IV NAUSEA/VOMITING;  Start 2/4/19 at 


03:30


Acetaminophen (Tylenol Tab) 650 mg Q6H  PRN PO .PAIN 1-3 OR TEMP;  Start 2/4/19 


at 03:30


Acetaminophen/ Hydrocodone Bitart (Norco (5/325)) 1 tab Q6H  PRN PO .MOD PAIN 4-


6;  Start 2/4/19 at 03:30


Docusate Sodium (Colace) 100 mg Q12H  PRN PO .CONSTIPATION;  Start 2/4/19 at 


03:30


Magnesium Hydroxide (Milk Of Mag) 30 ml DAILY  PRN PO .CONSTIPATION;  Start 


2/4/19 at 03:30


Pantoprazole (Protonix Iv) 40 mg DAILY@06 IV  Last administered on 2/8/19at 


05:12; Admin Dose 40 MG;  Start 2/4/19 at 06:00


Heparin Sodium (Porcine) (Heparin  (5000 Units/1ml)) 5,000 unit Q12 SC  Last 


administered on 2/8/19at 08:32; Admin Dose 5,000 UNIT;  Start 2/4/19 at 09:00


Albuterol/ Ipratropium (Duoneb) 3 ml Q4H RESP THERAPY  PRN HHN SHORTNESS OF 


BREATH;  Start 2/4/19 at 03:30


Hydralazine HCl (Apresoline) 10 mg Q6H  PRN IV ELEVATED BLOOD PRESSURE;  Start 


2/4/19 at 03:30


Ceftriaxone Sodium 50 ml @  100 mls/hr Q24H IVPB  Last administered on 2/8/19at 


02:56; Admin Dose 100 MLS/HR;  Start 2/4/19 at 03:30


Nitroglycerin (Nitroglycerin (Sl Tab) 0.4 Mg) 1 tab Q5M  PRN SL ANGINA;  Start 


2/4/19 at 03:30


Lorazepam (Ativan) 1 mg Q2H  PRN IV SEIZURES Last administered on 2/4/19at 


03:57; Admin Dose 1 MG;  Start 2/4/19 at 03:30


Miscellaneous Information (Pending Santyl Order For Wound Care) This patient 


ha... PRN  PRN XX WOUND CARE;  Start 2/4/19 at 05:00


Dextrose/Sodium Chloride 1,000 ml @  75 mls/hr [id number] IV  Last administered on 


2/8/19at 11:46; Admin Dose 75 MLS/HR;  Start 2/5/19 at 17:00


Propofol 100 ml @  1.227 mls/ hr Q12H IV ;  Start 2/5/19 at 21:00


Norepinephrine 250 ml @  1.875 mls/ hr TITRATE IV  Last administered on 2/7/19at


14:09; Admin Dose 15 MLS/HR;  Start 2/5/19 at 23:00


Fluconazole 100 ml @  100 mls/hr Q24H IVPB  Last administered on 2/7/19at 11:39;


Admin Dose 100 MLS/HR;  Start 2/6/19 at 12:00


Collagenase (Santyl) 1 applic DAILY TOP  Last administered on 2/8/19at 08:32; 


Admin Dose 1 APPLIC;  Start 2/6/19 at 11:30


IV Flush (NS 10 ml) 10 ml PRN  PRN IV IV PROTOCOL;  Start 2/6/19 at 15:00


Vancomycin HCl (Vanco Iv Per Pharmacy) VANCOMYCIN PER PHARMACY PER PROTOCOL XX ;


 Start 2/7/19 at 17:00


Vancomycin/Sodium Chloride 250 ml @  125 mls/hr Q24H IVPB  Last administered on 


2/7/19at 16:50; Admin Dose 125 MLS/HR;  Start 2/7/19 at 17:00


Morphine Sulfate (morphine) 2 mg Q4H  PRN IV .SEVERE PAIN 7-10;  Start 2/8/19 at


11:30


Miscellaneous Information (*Rx Drug Level Order Reminder*) VANCOMYCIN TROUGH 2/9


AT 1600 ONCE  ONCE XX ;  Start 2/9/19 at 16:00;  Stop 2/9/19 at 16:01











RUKHSANA ZEPEDA NP             Feb 8, 2019 12:27

## 2019-02-08 NOTE — EEG
EEG NOTE


Report Details


DATE OF TEST: 2/7/19





HISTORY:


The patient is a 58-year-old F in coma.





This EEG is requested to rule out nonconvulsive status epilepticus.





SEDATION:


None.





CONDITIONS OF RECORDING:


This EEG was recorded digitally on the [employer] machine, using the 


International 10-20 System of electrodes plus anterior temporals and Nz.





STATES SAMPLED:


Comatose.





FINDINGS:


The background is discontinuous and symmetric.





Rare and brief active intervals are predominated by polymorphic theta activity.





The normal anterior-to-posterior frequency-amplitude gradient was absent.





Photic stimulation does not elicit any definite driving responses or 


epileptiform discharges.





Hyperventilation was not performed.





No asymmetries, focal abnormalities or epileptiform discharges were seen.








IMPRESSION:


Abnormal electroencephalogram due to: severe diffuse slowing.





COMMENT:


The slowing of the background indicates severe, diffuse cortical dysfunction of 


nonspecific etiology.











JOSE FRANCOIS                  Feb 8, 2019 08:59

## 2019-02-08 NOTE — CONS
Assessment/Plan


Assessment/Plan


Hospital Course


59 yo F with hx of West Nile Virus NOS and reported chronic encephalopathy who 


p/w ams.


She was additionally reported to have a seizure... for which neurology is co


nsulted.





Most clinically consistent with new onset epilepsy.





Initial EEG was notable for GPEDs and an electrographic seizure originating in 


the R temporal region..


s/p Dilantin load, with ongoing episodic staring spells (presumed seizures) 


noted clinically..


s/p Phenobarbital load on 2/5..with resolution of episodes suspicious for 


seizure





MRI is unrevealing.


Repeat EEG is without epileptiform activity 





P:


Hold phb


Hold Keppra and Dilantin for now


Limit other sedating medications where possible


Other medical management per primary


Will follow clinically





Consultation Date/Type/Reason


Admit Date/Time


Feb 3, 2019 at 22:12


Type of Consult


Neurology


Reason for Consultation


new onset seizures


Requesting Provider:  LAURENCE CASTELLANO


Date/Time of Note


DATE: 2/8/19 


TIME: 16:04





24 HR Interval Summary


Free Text/Dictation


Continues critical care. S/p EEG. No acute events or changes in pt condition 


reported.


Subjective hx not possible:  pt non-verbal, pt critical status





Exam


Vital Signs


Vitals





Vital Signs


  Date      Temp  Pulse  Resp  B/P (MAP)   Pulse Ox  O2          O2 Flow    FiO2


Time                                                 Delivery    Rate


    2/8/19           88    14      112/79       100


     14:30                           (90)


    2/8/19                                           Mechanical


     14:00                                           Ventilator


    2/8/19  97.8


     12:00


    2/8/19                                                                    30


     08:00


    2/5/19                                                             2.0


     09:01








Intake and Output





2/7/19 2/7/19 2/8/19





1515:00


23:00


07:00





IntakeIntake Total


1155.50 ml


1471.25 ml


1202.50 ml





OutputOutput Total


1180 ml


1550 ml


1400 ml





BalanceBalance


-24.50 ml


-78.75 ml


-197.50 ml














Exam


PE:


Gen Appearance: No Apparent Distress


HEENT: Intubated


Cardiovascular: Regular rate


Abdomen: Soft


Extremities: Dry





NE:


The patient was obtunded. Grimaced to noxious stimuli





Cranial nerve examination was limited by mental status. Pupils were equal and 


reactive to light. There was no afferent pupillary defect. Funduscopic 


examination was limited. Face was grossly symmetric, w/ present corneal and 


cough reflexes.





Tone was normal. Muscle bulk was diminished. I did not see fasciculations. The 


patient minimally withdrew to noxious stimulation in her RUE and LLE.





Coordination and gait testing was limited by mental status.





Arm and leg reflexes were within normal limits and symmetric. Richmond's sign was


absent. Plantar responses were flexor.











JULIETA SINGH NP           Feb 8, 2019 16:04

## 2019-02-08 NOTE — CONS
Consult Date/Type/Reason


Admit Date/Time


Feb 3, 2019 at 22:12


Initial Consult Date


2/5/19


Type of Consult


Pulmonary


Requesting Provider:  LAURENCE CASTELLANO


Date/Time of Note


DATE: 2/8/19 


TIME: 13:29





Subjective


Remains unresponsive on mechanical ventilation.





Objective





Vital Signs


  Date      Temp  Pulse  Resp  B/P (MAP)   Pulse Ox  O2          O2 Flow    FiO2


Time                                                 Delivery    Rate


    2/8/19           79


     12:00


    2/8/19                 14  98/72 (81)       100


     11:45


    2/8/19                                           Mechanical


     11:00                                           Ventilator


    2/8/19  97.3


     08:00


    2/8/19                                                                    30


     08:00


    2/5/19                                                             2.0


     09:01








Intake and Output





2/7/19 2/7/19 2/8/19





1515:00


23:00


07:00





IntakeIntake Total


1155.50 ml


1471.25 ml


1202.50 ml





OutputOutput Total


1180 ml


1550 ml


1400 ml





BalanceBalance


-24.50 ml


-78.75 ml


-197.50 ml











Exam


PHYSICAL EXAMINATION:


GENERAL:  Elderly, chronically ill-appearing lady, orally intubated on 


mechanical ventilation.


VITAL SIGNS: 


NECK:  Supple.  No JVD, lymphadenopathy.


CARDIAC:  S1, S2, no added sounds or murmurs.


CHEST:  Diminished air entry bilaterally.


ABDOMEN:  Soft, nontender.  No guarding or rebound.


EXTREMITIES:  No cyanosis, clubbing, or edema.


NEUROLOGIC:  Unable to assess, currently sedated.





Vent Setting


Ventilator Support Mode:  AC


Fraction of Inspired Oxygen pe:  30


Positive End Expiratory Pressu:  5.0





Results/Medications


Result Diagram:  


2/8/19 0445                                                                     


          2/8/19 0445





Results 24 hrs





Laboratory Tests


               Test
                                2/8/19
04:45


               White Blood Count                         11.2  H


               Red Blood Count                           3.55  L


               Hemoglobin                                11.1  L


               Hematocrit                                31.8  L


               Mean Corpuscular Volume                    89.6


               Mean Corpuscular Hemoglobin                31.3


               Mean Corpuscular Hemoglobin
Concent       34.9  



               Red Cell Distribution Width                12.6


               Platelet Count                             166  #


               Mean Platelet Volume                       10.0


               Immature Granulocytes %                   0.400


               Neutrophils %                              76.4


               Lymphocytes %                             10.5  L


               Monocytes %                                 9.2


               Eosinophils %                               3.0


               Basophils %                                 0.5


               Nucleated Red Blood Cells %                 0.0


               Immature Granulocytes #                  0.050  H


               Neutrophils #                              8.5  H


               Lymphocytes #                               1.2


               Monocytes #                                1.0  H


               Eosinophils #                               0.3


               Basophils #                                 0.1


               Nucleated Red Blood Cells #                 0.0


               Sodium Level                                139


               Potassium Level                           2.6  *L


               Chloride Level                             111  H


               Carbon Dioxide Level                         24


               Anion Gap                                    4  L


               Blood Urea Nitrogen                          6  L


               Creatinine                                 0.58


               Est Glomerular Filtrat Rate
mL/min   > 60  



               Glucose Level                               148


               Calcium Level                              7.8  L


               Phosphorus Level                           1.8  L


               Magnesium Level                            1.5  L





Medications





Current Medications


IV Flush (NS 3 ml) 3 ml PER PROTOCOL IV ;  Start 2/4/19 at 03:30


Ondansetron HCl (Zofran Inj) 4 mg Q6H  PRN IV NAUSEA/VOMITING;  Start 2/4/19 at 


03:30


Acetaminophen (Tylenol Tab) 650 mg Q6H  PRN PO .PAIN 1-3 OR TEMP;  Start 2/4/19 


at 03:30


Acetaminophen/ Hydrocodone Bitart (Norco (5/325)) 1 tab Q6H  PRN PO .MOD PAIN 4-


6;  Start 2/4/19 at 03:30


Docusate Sodium (Colace) 100 mg Q12H  PRN PO .CONSTIPATION;  Start 2/4/19 at 


03:30


Magnesium Hydroxide (Milk Of Mag) 30 ml DAILY  PRN PO .CONSTIPATION;  Start 


2/4/19 at 03:30


Pantoprazole (Protonix Iv) 40 mg DAILY@06 IV  Last administered on 2/8/19at 


05:12; Admin Dose 40 MG;  Start 2/4/19 at 06:00


Heparin Sodium (Porcine) (Heparin  (5000 Units/1ml)) 5,000 unit Q12 SC  Last 


administered on 2/8/19at 08:32; Admin Dose 5,000 UNIT;  Start 2/4/19 at 09:00


Albuterol/ Ipratropium (Duoneb) 3 ml Q4H RESP THERAPY  PRN HHN SHORTNESS OF 


BREATH;  Start 2/4/19 at 03:30


Hydralazine HCl (Apresoline) 10 mg Q6H  PRN IV ELEVATED BLOOD PRESSURE;  Start 


2/4/19 at 03:30


Nitroglycerin (Nitroglycerin (Sl Tab) 0.4 Mg) 1 tab Q5M  PRN SL ANGINA;  Start 


2/4/19 at 03:30


Lorazepam (Ativan) 1 mg Q2H  PRN IV SEIZURES Last administered on 2/4/19at 


03:57; Admin Dose 1 MG;  Start 2/4/19 at 03:30


Miscellaneous Information (Pending Santyl Order For Wound Care) This patient 


ha... PRN  PRN XX WOUND CARE;  Start 2/4/19 at 05:00


Dextrose/Sodium Chloride 1,000 ml @  75 mls/hr [id number] IV  Last administered on 


2/8/19at 11:46; Admin Dose 75 MLS/HR;  Start 2/5/19 at 17:00


Propofol 100 ml @  1.227 mls/ hr Q12H IV ;  Start 2/5/19 at 21:00


Norepinephrine 250 ml @  1.875 mls/ hr TITRATE IV  Last administered on 2/7/19at


14:09; Admin Dose 15 MLS/HR;  Start 2/5/19 at 23:00


Fluconazole 100 ml @  100 mls/hr Q24H IVPB  Last administered on 2/8/19at 12:36;


Admin Dose 100 MLS/HR;  Start 2/6/19 at 12:00


Collagenase (Santyl) 1 applic DAILY TOP  Last administered on 2/8/19at 08:32; 


Admin Dose 1 APPLIC;  Start 2/6/19 at 11:30


IV Flush (NS 10 ml) 10 ml PRN  PRN IV IV PROTOCOL;  Start 2/6/19 at 15:00


Vancomycin HCl (Vanco Iv Per Pharmacy) VANCOMYCIN PER PHARMACY PER PROTOCOL XX ;


 Start 2/7/19 at 17:00


Vancomycin/Sodium Chloride 250 ml @  125 mls/hr Q24H IVPB  Last administered on 


2/7/19at 16:50; Admin Dose 125 MLS/HR;  Start 2/7/19 at 17:00


Morphine Sulfate (morphine) 2 mg Q4H  PRN IV .SEVERE PAIN 7-10;  Start 2/8/19 at


11:30


Miscellaneous Information (*Rx Drug Level Order Reminder*) VANCOMYCIN TROUGH 2/9


AT 1600 ONCE  ONCE XX ;  Start 2/9/19 at 16:00;  Stop 2/9/19 at 16:01


Cefepime HCl 50 ml @  100 mls/hr Q12 IVPB ;  Start 2/8/19 at 13:00





Assessment/Plan


Hospital Course (Demo Recall)





IMPRESSION 


1.  Seizure requiring intubation for airway protection now patient remains 


significantly encephalopathic MRI negative for acute bleed or CVA 


2.  Metabolic acidosis likely secondary to hypoperfusion.  Now improved


3.  Significant lactic acidosis with evidence of septic shock given hypotension 


and vasopressor support.


 


Plan


1.  Continue fluid resuscitation.


2.  Intravenous antibiotics pending culture results.


3.  Continue antiepileptics.


4.  EEG evaluation and neurology recommendations. MRI noted.  Extremely poor 


prognosis.


5.  DVT and GI prophylaxis.





family conference re goals of care. 


cc 40mins.











RIC CAROLINA MD, [facility]      Feb 8, 2019 13:32

## 2019-02-09 VITALS — DIASTOLIC BLOOD PRESSURE: 62 MMHG | SYSTOLIC BLOOD PRESSURE: 92 MMHG | HEART RATE: 73 BPM | RESPIRATION RATE: 14 BRPM

## 2019-02-09 VITALS — SYSTOLIC BLOOD PRESSURE: 96 MMHG | HEART RATE: 85 BPM | DIASTOLIC BLOOD PRESSURE: 70 MMHG | RESPIRATION RATE: 15 BRPM

## 2019-02-09 VITALS — RESPIRATION RATE: 14 BRPM | DIASTOLIC BLOOD PRESSURE: 67 MMHG | HEART RATE: 70 BPM | SYSTOLIC BLOOD PRESSURE: 90 MMHG

## 2019-02-09 VITALS — RESPIRATION RATE: 14 BRPM | SYSTOLIC BLOOD PRESSURE: 101 MMHG | HEART RATE: 74 BPM | DIASTOLIC BLOOD PRESSURE: 71 MMHG

## 2019-02-09 VITALS — RESPIRATION RATE: 14 BRPM | HEART RATE: 71 BPM | SYSTOLIC BLOOD PRESSURE: 91 MMHG | DIASTOLIC BLOOD PRESSURE: 69 MMHG

## 2019-02-09 VITALS — SYSTOLIC BLOOD PRESSURE: 94 MMHG | RESPIRATION RATE: 14 BRPM | DIASTOLIC BLOOD PRESSURE: 66 MMHG | HEART RATE: 74 BPM

## 2019-02-09 VITALS — RESPIRATION RATE: 14 BRPM | SYSTOLIC BLOOD PRESSURE: 90 MMHG | DIASTOLIC BLOOD PRESSURE: 64 MMHG | HEART RATE: 78 BPM

## 2019-02-09 VITALS — RESPIRATION RATE: 14 BRPM | HEART RATE: 70 BPM | DIASTOLIC BLOOD PRESSURE: 63 MMHG | SYSTOLIC BLOOD PRESSURE: 88 MMHG

## 2019-02-09 VITALS — SYSTOLIC BLOOD PRESSURE: 90 MMHG | HEART RATE: 73 BPM | DIASTOLIC BLOOD PRESSURE: 61 MMHG | RESPIRATION RATE: 14 BRPM

## 2019-02-09 VITALS — SYSTOLIC BLOOD PRESSURE: 93 MMHG | DIASTOLIC BLOOD PRESSURE: 66 MMHG | HEART RATE: 72 BPM | RESPIRATION RATE: 14 BRPM

## 2019-02-09 VITALS — DIASTOLIC BLOOD PRESSURE: 68 MMHG | RESPIRATION RATE: 14 BRPM | HEART RATE: 68 BPM | SYSTOLIC BLOOD PRESSURE: 94 MMHG

## 2019-02-09 VITALS — DIASTOLIC BLOOD PRESSURE: 71 MMHG | RESPIRATION RATE: 14 BRPM | HEART RATE: 88 BPM | SYSTOLIC BLOOD PRESSURE: 100 MMHG

## 2019-02-09 VITALS — HEART RATE: 81 BPM | SYSTOLIC BLOOD PRESSURE: 98 MMHG | DIASTOLIC BLOOD PRESSURE: 70 MMHG | RESPIRATION RATE: 14 BRPM

## 2019-02-09 VITALS — SYSTOLIC BLOOD PRESSURE: 90 MMHG | RESPIRATION RATE: 14 BRPM | HEART RATE: 72 BPM | DIASTOLIC BLOOD PRESSURE: 64 MMHG

## 2019-02-09 VITALS — DIASTOLIC BLOOD PRESSURE: 70 MMHG | HEART RATE: 82 BPM | RESPIRATION RATE: 14 BRPM | SYSTOLIC BLOOD PRESSURE: 96 MMHG

## 2019-02-09 VITALS — RESPIRATION RATE: 14 BRPM | HEART RATE: 70 BPM | DIASTOLIC BLOOD PRESSURE: 63 MMHG | SYSTOLIC BLOOD PRESSURE: 92 MMHG

## 2019-02-09 VITALS — RESPIRATION RATE: 14 BRPM | SYSTOLIC BLOOD PRESSURE: 97 MMHG | DIASTOLIC BLOOD PRESSURE: 72 MMHG | HEART RATE: 72 BPM

## 2019-02-09 VITALS — HEART RATE: 70 BPM | SYSTOLIC BLOOD PRESSURE: 96 MMHG | RESPIRATION RATE: 14 BRPM | DIASTOLIC BLOOD PRESSURE: 67 MMHG

## 2019-02-09 VITALS — HEART RATE: 79 BPM | DIASTOLIC BLOOD PRESSURE: 60 MMHG | RESPIRATION RATE: 15 BRPM | SYSTOLIC BLOOD PRESSURE: 95 MMHG

## 2019-02-09 VITALS — RESPIRATION RATE: 14 BRPM

## 2019-02-09 VITALS — DIASTOLIC BLOOD PRESSURE: 62 MMHG | SYSTOLIC BLOOD PRESSURE: 85 MMHG | RESPIRATION RATE: 14 BRPM | HEART RATE: 78 BPM

## 2019-02-09 VITALS — RESPIRATION RATE: 14 BRPM | DIASTOLIC BLOOD PRESSURE: 69 MMHG | HEART RATE: 83 BPM | SYSTOLIC BLOOD PRESSURE: 92 MMHG

## 2019-02-09 VITALS — RESPIRATION RATE: 14 BRPM | DIASTOLIC BLOOD PRESSURE: 70 MMHG | HEART RATE: 76 BPM | SYSTOLIC BLOOD PRESSURE: 95 MMHG

## 2019-02-09 VITALS — DIASTOLIC BLOOD PRESSURE: 76 MMHG | HEART RATE: 83 BPM | RESPIRATION RATE: 15 BRPM | SYSTOLIC BLOOD PRESSURE: 109 MMHG

## 2019-02-09 VITALS — SYSTOLIC BLOOD PRESSURE: 106 MMHG | RESPIRATION RATE: 14 BRPM | DIASTOLIC BLOOD PRESSURE: 78 MMHG | HEART RATE: 91 BPM

## 2019-02-09 VITALS — SYSTOLIC BLOOD PRESSURE: 91 MMHG | DIASTOLIC BLOOD PRESSURE: 70 MMHG | HEART RATE: 87 BPM | RESPIRATION RATE: 14 BRPM

## 2019-02-09 VITALS — SYSTOLIC BLOOD PRESSURE: 90 MMHG | DIASTOLIC BLOOD PRESSURE: 67 MMHG | HEART RATE: 84 BPM | RESPIRATION RATE: 14 BRPM

## 2019-02-09 VITALS — RESPIRATION RATE: 14 BRPM | SYSTOLIC BLOOD PRESSURE: 93 MMHG | DIASTOLIC BLOOD PRESSURE: 71 MMHG | HEART RATE: 72 BPM

## 2019-02-09 VITALS — DIASTOLIC BLOOD PRESSURE: 58 MMHG | RESPIRATION RATE: 14 BRPM | HEART RATE: 73 BPM | SYSTOLIC BLOOD PRESSURE: 87 MMHG

## 2019-02-09 VITALS — RESPIRATION RATE: 14 BRPM | DIASTOLIC BLOOD PRESSURE: 72 MMHG | HEART RATE: 72 BPM | SYSTOLIC BLOOD PRESSURE: 95 MMHG

## 2019-02-09 VITALS — SYSTOLIC BLOOD PRESSURE: 91 MMHG | RESPIRATION RATE: 14 BRPM | HEART RATE: 85 BPM | DIASTOLIC BLOOD PRESSURE: 64 MMHG

## 2019-02-09 VITALS — RESPIRATION RATE: 14 BRPM | SYSTOLIC BLOOD PRESSURE: 101 MMHG | HEART RATE: 70 BPM | DIASTOLIC BLOOD PRESSURE: 74 MMHG

## 2019-02-09 VITALS — RESPIRATION RATE: 16 BRPM | HEART RATE: 77 BPM | SYSTOLIC BLOOD PRESSURE: 87 MMHG | DIASTOLIC BLOOD PRESSURE: 59 MMHG

## 2019-02-09 VITALS — SYSTOLIC BLOOD PRESSURE: 88 MMHG | HEART RATE: 72 BPM | DIASTOLIC BLOOD PRESSURE: 69 MMHG | RESPIRATION RATE: 14 BRPM

## 2019-02-09 VITALS — RESPIRATION RATE: 14 BRPM | HEART RATE: 74 BPM | DIASTOLIC BLOOD PRESSURE: 62 MMHG | SYSTOLIC BLOOD PRESSURE: 86 MMHG

## 2019-02-09 VITALS — HEART RATE: 78 BPM | DIASTOLIC BLOOD PRESSURE: 71 MMHG | SYSTOLIC BLOOD PRESSURE: 95 MMHG | RESPIRATION RATE: 14 BRPM

## 2019-02-09 VITALS — RESPIRATION RATE: 14 BRPM | SYSTOLIC BLOOD PRESSURE: 108 MMHG | HEART RATE: 69 BPM | DIASTOLIC BLOOD PRESSURE: 81 MMHG

## 2019-02-09 VITALS — DIASTOLIC BLOOD PRESSURE: 60 MMHG | SYSTOLIC BLOOD PRESSURE: 85 MMHG | RESPIRATION RATE: 14 BRPM | HEART RATE: 77 BPM

## 2019-02-09 VITALS — DIASTOLIC BLOOD PRESSURE: 73 MMHG | HEART RATE: 73 BPM | RESPIRATION RATE: 14 BRPM | SYSTOLIC BLOOD PRESSURE: 97 MMHG

## 2019-02-09 VITALS — RESPIRATION RATE: 17 BRPM | SYSTOLIC BLOOD PRESSURE: 97 MMHG | HEART RATE: 78 BPM | DIASTOLIC BLOOD PRESSURE: 71 MMHG

## 2019-02-09 VITALS — RESPIRATION RATE: 14 BRPM | SYSTOLIC BLOOD PRESSURE: 88 MMHG | HEART RATE: 80 BPM | DIASTOLIC BLOOD PRESSURE: 62 MMHG

## 2019-02-09 VITALS — HEART RATE: 69 BPM | DIASTOLIC BLOOD PRESSURE: 74 MMHG | SYSTOLIC BLOOD PRESSURE: 100 MMHG | RESPIRATION RATE: 14 BRPM

## 2019-02-09 VITALS — RESPIRATION RATE: 14 BRPM | HEART RATE: 73 BPM | SYSTOLIC BLOOD PRESSURE: 97 MMHG | DIASTOLIC BLOOD PRESSURE: 72 MMHG

## 2019-02-09 VITALS — HEART RATE: 72 BPM | SYSTOLIC BLOOD PRESSURE: 95 MMHG | RESPIRATION RATE: 14 BRPM | DIASTOLIC BLOOD PRESSURE: 68 MMHG

## 2019-02-09 VITALS — DIASTOLIC BLOOD PRESSURE: 69 MMHG | RESPIRATION RATE: 14 BRPM | HEART RATE: 83 BPM | SYSTOLIC BLOOD PRESSURE: 92 MMHG

## 2019-02-09 VITALS — HEART RATE: 73 BPM | RESPIRATION RATE: 14 BRPM | SYSTOLIC BLOOD PRESSURE: 96 MMHG | DIASTOLIC BLOOD PRESSURE: 71 MMHG

## 2019-02-09 VITALS — DIASTOLIC BLOOD PRESSURE: 83 MMHG | SYSTOLIC BLOOD PRESSURE: 104 MMHG | RESPIRATION RATE: 17 BRPM | HEART RATE: 87 BPM

## 2019-02-09 VITALS — RESPIRATION RATE: 14 BRPM | SYSTOLIC BLOOD PRESSURE: 99 MMHG | DIASTOLIC BLOOD PRESSURE: 75 MMHG | HEART RATE: 68 BPM

## 2019-02-09 VITALS — DIASTOLIC BLOOD PRESSURE: 70 MMHG | HEART RATE: 70 BPM | SYSTOLIC BLOOD PRESSURE: 98 MMHG | RESPIRATION RATE: 14 BRPM

## 2019-02-09 VITALS — SYSTOLIC BLOOD PRESSURE: 99 MMHG | HEART RATE: 69 BPM | RESPIRATION RATE: 15 BRPM | DIASTOLIC BLOOD PRESSURE: 69 MMHG

## 2019-02-09 VITALS — DIASTOLIC BLOOD PRESSURE: 72 MMHG | HEART RATE: 81 BPM | SYSTOLIC BLOOD PRESSURE: 96 MMHG | RESPIRATION RATE: 14 BRPM

## 2019-02-09 VITALS — HEART RATE: 69 BPM | SYSTOLIC BLOOD PRESSURE: 92 MMHG | DIASTOLIC BLOOD PRESSURE: 62 MMHG | RESPIRATION RATE: 14 BRPM

## 2019-02-09 VITALS — SYSTOLIC BLOOD PRESSURE: 96 MMHG | DIASTOLIC BLOOD PRESSURE: 65 MMHG | RESPIRATION RATE: 14 BRPM | HEART RATE: 85 BPM

## 2019-02-09 VITALS — HEART RATE: 85 BPM | RESPIRATION RATE: 14 BRPM | DIASTOLIC BLOOD PRESSURE: 73 MMHG | SYSTOLIC BLOOD PRESSURE: 96 MMHG

## 2019-02-09 VITALS — DIASTOLIC BLOOD PRESSURE: 69 MMHG | SYSTOLIC BLOOD PRESSURE: 87 MMHG | HEART RATE: 82 BPM | RESPIRATION RATE: 15 BRPM

## 2019-02-09 VITALS — DIASTOLIC BLOOD PRESSURE: 69 MMHG | SYSTOLIC BLOOD PRESSURE: 92 MMHG | HEART RATE: 83 BPM | RESPIRATION RATE: 14 BRPM

## 2019-02-09 VITALS — DIASTOLIC BLOOD PRESSURE: 63 MMHG | SYSTOLIC BLOOD PRESSURE: 84 MMHG | HEART RATE: 74 BPM | RESPIRATION RATE: 14 BRPM

## 2019-02-09 VITALS — HEART RATE: 73 BPM | SYSTOLIC BLOOD PRESSURE: 98 MMHG | DIASTOLIC BLOOD PRESSURE: 70 MMHG | RESPIRATION RATE: 14 BRPM

## 2019-02-09 VITALS — HEART RATE: 84 BPM | RESPIRATION RATE: 14 BRPM | SYSTOLIC BLOOD PRESSURE: 91 MMHG | DIASTOLIC BLOOD PRESSURE: 67 MMHG

## 2019-02-09 VITALS — RESPIRATION RATE: 14 BRPM | HEART RATE: 70 BPM | DIASTOLIC BLOOD PRESSURE: 79 MMHG | SYSTOLIC BLOOD PRESSURE: 107 MMHG

## 2019-02-09 VITALS — RESPIRATION RATE: 14 BRPM | SYSTOLIC BLOOD PRESSURE: 94 MMHG | DIASTOLIC BLOOD PRESSURE: 68 MMHG | HEART RATE: 69 BPM

## 2019-02-09 VITALS — RESPIRATION RATE: 14 BRPM | HEART RATE: 73 BPM | SYSTOLIC BLOOD PRESSURE: 94 MMHG | DIASTOLIC BLOOD PRESSURE: 69 MMHG

## 2019-02-09 VITALS — DIASTOLIC BLOOD PRESSURE: 60 MMHG | SYSTOLIC BLOOD PRESSURE: 88 MMHG | HEART RATE: 81 BPM | RESPIRATION RATE: 21 BRPM

## 2019-02-09 VITALS — SYSTOLIC BLOOD PRESSURE: 85 MMHG | HEART RATE: 76 BPM | DIASTOLIC BLOOD PRESSURE: 67 MMHG | RESPIRATION RATE: 14 BRPM

## 2019-02-09 VITALS — SYSTOLIC BLOOD PRESSURE: 109 MMHG | DIASTOLIC BLOOD PRESSURE: 74 MMHG | HEART RATE: 72 BPM | RESPIRATION RATE: 14 BRPM

## 2019-02-09 VITALS — DIASTOLIC BLOOD PRESSURE: 63 MMHG | HEART RATE: 71 BPM | SYSTOLIC BLOOD PRESSURE: 88 MMHG | RESPIRATION RATE: 14 BRPM

## 2019-02-09 VITALS — HEART RATE: 76 BPM | RESPIRATION RATE: 14 BRPM | SYSTOLIC BLOOD PRESSURE: 85 MMHG | DIASTOLIC BLOOD PRESSURE: 61 MMHG

## 2019-02-09 VITALS — DIASTOLIC BLOOD PRESSURE: 72 MMHG | HEART RATE: 72 BPM | RESPIRATION RATE: 15 BRPM | SYSTOLIC BLOOD PRESSURE: 96 MMHG

## 2019-02-09 VITALS — DIASTOLIC BLOOD PRESSURE: 64 MMHG | HEART RATE: 82 BPM | SYSTOLIC BLOOD PRESSURE: 92 MMHG | RESPIRATION RATE: 15 BRPM

## 2019-02-09 VITALS — SYSTOLIC BLOOD PRESSURE: 90 MMHG | HEART RATE: 80 BPM | RESPIRATION RATE: 14 BRPM | DIASTOLIC BLOOD PRESSURE: 62 MMHG

## 2019-02-09 VITALS — SYSTOLIC BLOOD PRESSURE: 102 MMHG | DIASTOLIC BLOOD PRESSURE: 71 MMHG | RESPIRATION RATE: 14 BRPM | HEART RATE: 71 BPM

## 2019-02-09 VITALS — SYSTOLIC BLOOD PRESSURE: 93 MMHG | HEART RATE: 85 BPM | DIASTOLIC BLOOD PRESSURE: 62 MMHG | RESPIRATION RATE: 14 BRPM

## 2019-02-09 VITALS — HEART RATE: 66 BPM | SYSTOLIC BLOOD PRESSURE: 100 MMHG | DIASTOLIC BLOOD PRESSURE: 74 MMHG | RESPIRATION RATE: 14 BRPM

## 2019-02-09 VITALS — SYSTOLIC BLOOD PRESSURE: 90 MMHG | DIASTOLIC BLOOD PRESSURE: 68 MMHG | HEART RATE: 70 BPM | RESPIRATION RATE: 14 BRPM

## 2019-02-09 VITALS — RESPIRATION RATE: 14 BRPM | HEART RATE: 73 BPM | DIASTOLIC BLOOD PRESSURE: 64 MMHG | SYSTOLIC BLOOD PRESSURE: 88 MMHG

## 2019-02-09 VITALS — SYSTOLIC BLOOD PRESSURE: 84 MMHG | DIASTOLIC BLOOD PRESSURE: 55 MMHG | RESPIRATION RATE: 18 BRPM | HEART RATE: 77 BPM

## 2019-02-09 VITALS — RESPIRATION RATE: 14 BRPM | HEART RATE: 72 BPM | DIASTOLIC BLOOD PRESSURE: 64 MMHG | SYSTOLIC BLOOD PRESSURE: 88 MMHG

## 2019-02-09 VITALS — DIASTOLIC BLOOD PRESSURE: 68 MMHG | HEART RATE: 72 BPM | SYSTOLIC BLOOD PRESSURE: 90 MMHG | RESPIRATION RATE: 14 BRPM

## 2019-02-09 VITALS — HEART RATE: 78 BPM | RESPIRATION RATE: 14 BRPM | DIASTOLIC BLOOD PRESSURE: 68 MMHG | SYSTOLIC BLOOD PRESSURE: 93 MMHG

## 2019-02-09 VITALS — RESPIRATION RATE: 14 BRPM | SYSTOLIC BLOOD PRESSURE: 98 MMHG | HEART RATE: 69 BPM | DIASTOLIC BLOOD PRESSURE: 72 MMHG

## 2019-02-09 VITALS — RESPIRATION RATE: 14 BRPM | SYSTOLIC BLOOD PRESSURE: 95 MMHG | DIASTOLIC BLOOD PRESSURE: 68 MMHG | HEART RATE: 73 BPM

## 2019-02-09 VITALS — SYSTOLIC BLOOD PRESSURE: 80 MMHG | RESPIRATION RATE: 14 BRPM | HEART RATE: 77 BPM | DIASTOLIC BLOOD PRESSURE: 60 MMHG

## 2019-02-09 VITALS — RESPIRATION RATE: 14 BRPM | SYSTOLIC BLOOD PRESSURE: 87 MMHG | DIASTOLIC BLOOD PRESSURE: 60 MMHG | HEART RATE: 74 BPM

## 2019-02-09 VITALS — SYSTOLIC BLOOD PRESSURE: 89 MMHG | DIASTOLIC BLOOD PRESSURE: 63 MMHG | HEART RATE: 72 BPM | RESPIRATION RATE: 14 BRPM

## 2019-02-09 VITALS — HEART RATE: 70 BPM | RESPIRATION RATE: 14 BRPM | SYSTOLIC BLOOD PRESSURE: 109 MMHG | DIASTOLIC BLOOD PRESSURE: 80 MMHG

## 2019-02-09 VITALS — SYSTOLIC BLOOD PRESSURE: 75 MMHG | RESPIRATION RATE: 14 BRPM | DIASTOLIC BLOOD PRESSURE: 55 MMHG | HEART RATE: 75 BPM

## 2019-02-09 VITALS — HEART RATE: 73 BPM | RESPIRATION RATE: 14 BRPM | SYSTOLIC BLOOD PRESSURE: 94 MMHG | DIASTOLIC BLOOD PRESSURE: 72 MMHG

## 2019-02-09 VITALS — RESPIRATION RATE: 14 BRPM | SYSTOLIC BLOOD PRESSURE: 88 MMHG | DIASTOLIC BLOOD PRESSURE: 67 MMHG | HEART RATE: 85 BPM

## 2019-02-09 VITALS — SYSTOLIC BLOOD PRESSURE: 89 MMHG | HEART RATE: 76 BPM | DIASTOLIC BLOOD PRESSURE: 62 MMHG | RESPIRATION RATE: 14 BRPM

## 2019-02-09 VITALS — DIASTOLIC BLOOD PRESSURE: 64 MMHG | SYSTOLIC BLOOD PRESSURE: 89 MMHG | HEART RATE: 70 BPM | RESPIRATION RATE: 14 BRPM

## 2019-02-09 VITALS — DIASTOLIC BLOOD PRESSURE: 62 MMHG | SYSTOLIC BLOOD PRESSURE: 87 MMHG | RESPIRATION RATE: 14 BRPM | HEART RATE: 70 BPM

## 2019-02-09 VITALS — SYSTOLIC BLOOD PRESSURE: 92 MMHG | RESPIRATION RATE: 14 BRPM | HEART RATE: 84 BPM | DIASTOLIC BLOOD PRESSURE: 68 MMHG

## 2019-02-09 VITALS — RESPIRATION RATE: 14 BRPM | DIASTOLIC BLOOD PRESSURE: 60 MMHG | SYSTOLIC BLOOD PRESSURE: 87 MMHG | HEART RATE: 76 BPM

## 2019-02-09 VITALS — SYSTOLIC BLOOD PRESSURE: 84 MMHG | DIASTOLIC BLOOD PRESSURE: 63 MMHG | HEART RATE: 79 BPM | RESPIRATION RATE: 14 BRPM

## 2019-02-09 VITALS — SYSTOLIC BLOOD PRESSURE: 86 MMHG | RESPIRATION RATE: 14 BRPM | HEART RATE: 82 BPM | DIASTOLIC BLOOD PRESSURE: 55 MMHG

## 2019-02-09 VITALS — SYSTOLIC BLOOD PRESSURE: 86 MMHG | DIASTOLIC BLOOD PRESSURE: 57 MMHG | HEART RATE: 72 BPM | RESPIRATION RATE: 14 BRPM

## 2019-02-09 LAB
ADD MAN DIFF?: NO
ANION GAP: 4 (ref 5–13)
BASOPHIL #: 0 10^3/UL (ref 0–0.1)
BASOPHILS %: 0.4 % (ref 0–2)
BLOOD UREA NITROGEN: 10 MG/DL (ref 7–20)
CALCIUM: 7.8 MG/DL (ref 8.4–10.2)
CARBON DIOXIDE: 26 MMOL/L (ref 21–31)
CHLORIDE: 103 MMOL/L (ref 97–110)
CREATININE: 0.5 MG/DL (ref 0.44–1)
EOSINOPHILS #: 0.6 10^3/UL (ref 0–0.5)
EOSINOPHILS %: 5.3 % (ref 0–7)
GLUCOSE: 157 MG/DL (ref 70–220)
HEMATOCRIT: 27.6 % (ref 37–47)
HEMOGLOBIN: 9.6 G/DL (ref 12–16)
HEPATITIS B SURFACE ANTIGEN: POSITIVE
HEPATITIS C VIRAL ANTIBODY: NEGATIVE
IMMATURE GRANS #M: 0.05 10^3/UL (ref 0–0.03)
IMMATURE GRANS % (M): 0.5 % (ref 0–0.43)
LYMPHOCYTES #: 0.9 10^3/UL (ref 0.8–2.9)
LYMPHOCYTES %: 8.5 % (ref 15–51)
MAGNESIUM: 2.1 MG/DL (ref 1.7–2.5)
MEAN CORPUSCULAR HEMOGLOBIN: 31.6 PG (ref 29–33)
MEAN CORPUSCULAR HGB CONC: 34.8 G/DL (ref 32–37)
MEAN CORPUSCULAR VOLUME: 90.8 FL (ref 82–101)
MEAN PLATELET VOLUME: 9.6 FL (ref 7.4–10.4)
MONOCYTE #: 1.1 10^3/UL (ref 0.3–0.9)
MONOCYTES %: 10.7 % (ref 0–11)
NEUTROPHIL #: 7.9 10^3/UL (ref 1.6–7.5)
NEUTROPHILS %: 74.6 % (ref 39–77)
NUCLEATED RED BLOOD CELLS #: 0 10^3/UL (ref 0–0)
NUCLEATED RED BLOOD CELLS%: 0 /100WBC (ref 0–0)
PHOSPHORUS: 1.6 MG/DL (ref 2.5–4.9)
PLATELET COUNT: 160 10^3/UL (ref 140–415)
POTASSIUM: 3 MMOL/L (ref 3.5–5.1)
RAPID PLASMA REAGIN: NONREACTIVE
RED BLOOD COUNT: 3.04 10^6/UL (ref 4.2–5.4)
RED CELL DISTRIBUTION WIDTH: 13 % (ref 11.5–14.5)
SODIUM: 133 MMOL/L (ref 135–144)
WHITE BLOOD COUNT: 10.5 10^3/UL (ref 4.8–10.8)

## 2019-02-09 RX ADMIN — POTASSIUM CHLORIDE 1 MLS/HR: 200 INJECTION, SOLUTION INTRAVENOUS at 12:28

## 2019-02-09 RX ADMIN — CEFEPIME 1 MLS/HR: 1 INJECTION, POWDER, FOR SOLUTION INTRAMUSCULAR; INTRAVENOUS at 10:22

## 2019-02-09 RX ADMIN — PANTOPRAZOLE SODIUM SCH MG: 40 INJECTION, POWDER, FOR SOLUTION INTRAVENOUS at 05:09

## 2019-02-09 RX ADMIN — POTASSIUM CHLORIDE 1 MLS/HR: 200 INJECTION, SOLUTION INTRAVENOUS at 08:32

## 2019-02-09 RX ADMIN — FLUCONAZOLE, SODIUM CHLORIDE SCH MLS/HR: 2 INJECTION INTRAVENOUS at 12:29

## 2019-02-09 RX ADMIN — CEFEPIME SCH MLS/HR: 1 INJECTION, POWDER, FOR SOLUTION INTRAMUSCULAR; INTRAVENOUS at 10:22

## 2019-02-09 RX ADMIN — ASCORBIC ACID, VITAMIN A PALMITATE, CHOLECALCIFEROL, THIAMINE HYDROCHLORIDE, RIBOFLAVIN-5 PHOSPHATE SODIUM, PYRIDOXINE HYDROCHLORIDE, NIACINAMIDE, DEXPANTHENOL, ALPHA-TOCOPHEROL ACETATE, VITAMIN K1, FOLIC ACID, BIOTIN, CYANOCOBALAMIN 1 MLS/HR: 200; 3300; 200; 6; 3.6; 6; 40; 15; 10; 150; 600; 60; 5 INJECTION, SOLUTION INTRAVENOUS at 20:16

## 2019-02-09 RX ADMIN — LEVETIRACETAM 1 MLS/HR: 5 INJECTION INTRAVENOUS at 20:13

## 2019-02-09 RX ADMIN — HEPARIN SODIUM 1 UNIT: 5000 INJECTION, SOLUTION INTRAVENOUS; SUBCUTANEOUS at 10:27

## 2019-02-09 RX ADMIN — PROPOFOL SCH MLS/HR: 10 INJECTION, EMULSION INTRAVENOUS at 20:09

## 2019-02-09 RX ADMIN — HEPARIN SODIUM 1 UNIT: 5000 INJECTION, SOLUTION INTRAVENOUS; SUBCUTANEOUS at 20:18

## 2019-02-09 RX ADMIN — LEVETIRACETAM SCH MLS/HR: 5 INJECTION INTRAVENOUS at 20:13

## 2019-02-09 RX ADMIN — POTASSIUM CHLORIDE 1 MLS/HR: 200 INJECTION, SOLUTION INTRAVENOUS at 14:25

## 2019-02-09 RX ADMIN — PROPOFOL 1 MLS/HR: 10 INJECTION, EMULSION INTRAVENOUS at 08:33

## 2019-02-09 RX ADMIN — SODIUM PHOSPHATE, DIBASIC, ANHYDROUS, POTASSIUM PHOSPHATE, MONOBASIC, AND SODIUM PHOSPHATE, MONOBASIC, MONOHYDRATE 1 MG: 852; 155; 130 TABLET, COATED ORAL at 10:22

## 2019-02-09 RX ADMIN — CEFEPIME SCH MLS/HR: 1 INJECTION, POWDER, FOR SOLUTION INTRAMUSCULAR; INTRAVENOUS at 20:16

## 2019-02-09 RX ADMIN — PANTOPRAZOLE SODIUM 1 MG: 40 INJECTION, POWDER, FOR SOLUTION INTRAVENOUS at 05:09

## 2019-02-09 RX ADMIN — POTASSIUM CHLORIDE SCH MLS/HR: 200 INJECTION, SOLUTION INTRAVENOUS at 08:32

## 2019-02-09 RX ADMIN — COLLAGENASE SANTYL SCH APPLIC: 250 OINTMENT TOPICAL at 10:22

## 2019-02-09 RX ADMIN — FLUCONAZOLE, SODIUM CHLORIDE 1 MLS/HR: 2 INJECTION INTRAVENOUS at 12:29

## 2019-02-09 RX ADMIN — PROPOFOL SCH MLS/HR: 10 INJECTION, EMULSION INTRAVENOUS at 08:33

## 2019-02-09 RX ADMIN — HEPARIN SODIUM SCH UNIT: 5000 INJECTION, SOLUTION INTRAVENOUS; SUBCUTANEOUS at 10:27

## 2019-02-09 RX ADMIN — PROPOFOL 1 MLS/HR: 10 INJECTION, EMULSION INTRAVENOUS at 20:09

## 2019-02-09 RX ADMIN — FOLIC ACID SCH MLS/HR: 5 INJECTION, SOLUTION INTRAMUSCULAR; INTRAVENOUS; SUBCUTANEOUS at 01:59

## 2019-02-09 RX ADMIN — CEFEPIME 1 MLS/HR: 1 INJECTION, POWDER, FOR SOLUTION INTRAMUSCULAR; INTRAVENOUS at 20:16

## 2019-02-09 RX ADMIN — POTASSIUM CHLORIDE SCH MLS/HR: 200 INJECTION, SOLUTION INTRAVENOUS at 14:25

## 2019-02-09 RX ADMIN — COLLAGENASE SANTYL 1 APPLIC: 250 OINTMENT TOPICAL at 10:22

## 2019-02-09 RX ADMIN — ASCORBIC ACID, VITAMIN A PALMITATE, CHOLECALCIFEROL, THIAMINE HYDROCHLORIDE, RIBOFLAVIN-5 PHOSPHATE SODIUM, PYRIDOXINE HYDROCHLORIDE, NIACINAMIDE, DEXPANTHENOL, ALPHA-TOCOPHEROL ACETATE, VITAMIN K1, FOLIC ACID, BIOTIN, CYANOCOBALAMIN 1 MLS/HR: 200; 3300; 200; 6; 3.6; 6; 40; 15; 10; 150; 600; 60; 5 INJECTION, SOLUTION INTRAVENOUS at 01:59

## 2019-02-09 RX ADMIN — POTASSIUM CHLORIDE SCH MLS/HR: 200 INJECTION, SOLUTION INTRAVENOUS at 12:28

## 2019-02-09 RX ADMIN — FOLIC ACID SCH MLS/HR: 5 INJECTION, SOLUTION INTRAMUSCULAR; INTRAVENOUS; SUBCUTANEOUS at 20:16

## 2019-02-09 RX ADMIN — HEPARIN SODIUM SCH UNIT: 5000 INJECTION, SOLUTION INTRAVENOUS; SUBCUTANEOUS at 20:18

## 2019-02-09 RX ADMIN — POTASSIUM PHOSPHATE, MONOBASIC AND POTASSIUM PHOSPHATE, DIBASIC 1 MLS/HR: 224; 236 INJECTION, SOLUTION, CONCENTRATE INTRAVENOUS at 17:15

## 2019-02-09 NOTE — CONS
Consult Date/Type/Reason


Admit Date/Time


Feb 3, 2019 at 22:12


Initial Consult Date


2/5/19


Type of Consultation:  Pulm/CCM


Requesting Provider:  LAURENCE CASTELLANO


Date/Time of Note


DATE: 2/9/19 


TIME: 11:15





Subjective


Sedated on the vent. No events overnight.





Objective


Vitals





Vital Signs


  Date      Temp  Pulse  Resp  B/P (MAP)   Pulse Ox  O2          O2 Flow    FiO2


Time                                                 Delivery    Rate


    2/9/19           72    14  93/71 (78)       100


     10:45


    2/9/19                                           Mechanical


     10:00                                           Ventilator


    2/9/19                                                                    30


     08:00


    2/9/19  98.3


     08:00


    2/5/19                                                             2.0


     09:01








Intake and Output





2/8/19 2/8/19 2/9/19





1515:00


23:00


07:00





IntakeIntake Total


995.00 ml


1457.451 ml


1004.377 ml





OutputOutput Total


1125 ml


650 ml


200 ml





BalanceBalance


-130.00 ml


807.451 ml


804.377 ml











Exam


HEENT: Neck supple; no JVD; no LAD; + ET tube 


CVS: RRR, S1 and S2; 


CHEST: Clear


ABD: Soft, NT, + BS


EXT: No c/c/e





Results/Medications


Result Diagram:  


2/9/19 0400 2/9/19 0400





Results 24 hrs





Laboratory Tests


               Test
                                2/9/19
04:00


               White Blood Count                          10.5


               Red Blood Count                           3.04  L


               Hemoglobin                                 9.6  L


               Hematocrit                                27.6  L


               Mean Corpuscular Volume                    90.8


               Mean Corpuscular Hemoglobin                31.6


               Mean Corpuscular Hemoglobin
Concent       34.8  



               Red Cell Distribution Width                13.0


               Platelet Count                              160


               Mean Platelet Volume                        9.6


               Immature Granulocytes %                  0.500  H


               Neutrophils %                              74.6


               Lymphocytes %                              8.5  L


               Monocytes %                                10.7


               Eosinophils %                               5.3


               Basophils %                                 0.4


               Nucleated Red Blood Cells %                 0.0


               Immature Granulocytes #                  0.050  H


               Neutrophils #                              7.9  H


               Lymphocytes #                               0.9


               Monocytes #                                1.1  H


               Eosinophils #                              0.6  H


               Basophils #                                 0.0


               Nucleated Red Blood Cells #                 0.0


               Sodium Level                               133  L


               Potassium Level                            3.0  L


               Chloride Level                              103


               Carbon Dioxide Level                         26


               Anion Gap                                    4  L


               Blood Urea Nitrogen                          10


               Creatinine                                 0.50


               Est Glomerular Filtrat Rate
mL/min   > 60  



               Glucose Level                               157


               Calcium Level                              7.8  L


               Phosphorus Level                           1.6  L


               Magnesium Level                             2.1





Home Meds


Reported Medications


Cyanocobalamin* (Vitamin B-12*) 50 Mcg Tablet, 50 MCG PO DAILY, TAB


   2/4/19


Amlodipine Besylate* (Amlodipine Besylate*) 2.5 Mg Tablet, 5 MG PO DAILY, #30 


TAB


   2/4/19


Risperidone* (Risperidone*) 0.5 Mg Tablet, 0.5 MG PO DAILY, TAB


   2/4/19


Lamotrigine* (Lamotrigine*) 25 Mg Tablet, 25 MG PO DAILY, TAB


   2/4/19


Benztropine Mesylate* (Benztropine Mesylate*) 2 Mg Tablet, 2 MG PO BID, TAB


   2/4/19


Medications





Current Medications


IV Flush (NS 3 ml) 3 ml PER PROTOCOL IV ;  Start 2/4/19 at 03:30


Ondansetron HCl (Zofran Inj) 4 mg Q6H  PRN IV NAUSEA/VOMITING;  Start 2/4/19 at 


03:30


Acetaminophen (Tylenol Tab) 650 mg Q6H  PRN PO .PAIN 1-3 OR TEMP;  Start 2/4/19 


at 03:30


Docusate Sodium (Colace) 100 mg Q12H  PRN PO .CONSTIPATION;  Start 2/4/19 at 


03:30


Magnesium Hydroxide (Milk Of Mag) 30 ml DAILY  PRN PO .CONSTIPATION;  Start 


2/4/19 at 03:30


Pantoprazole (Protonix Iv) 40 mg DAILY@06 IV  Last administered on 2/9/19at 


05:09; Admin Dose 40 MG;  Start 2/4/19 at 06:00


Heparin Sodium (Porcine) (Heparin  (5000 Units/1ml)) 5,000 unit Q12 SC  Last 


administered on 2/9/19at 10:27; Admin Dose 5,000 UNIT;  Start 2/4/19 at 09:00


Albuterol/ Ipratropium (Duoneb) 3 ml Q4H RESP THERAPY  PRN HHN SHORTNESS OF 


BREATH;  Start 2/4/19 at 03:30


Hydralazine HCl (Apresoline) 10 mg Q6H  PRN IV ELEVATED BLOOD PRESSURE;  Start 


2/4/19 at 03:30


Nitroglycerin (Nitroglycerin (Sl Tab) 0.4 Mg) 1 tab Q5M  PRN SL ANGINA;  Start 


2/4/19 at 03:30


Lorazepam (Ativan) 1 mg Q2H  PRN IV SEIZURES Last administered on 2/4/19at 0


3:57; Admin Dose 1 MG;  Start 2/4/19 at 03:30


Miscellaneous Information (Pending Santyl Order For Wound Care) This patient 


ha... PRN  PRN XX WOUND CARE;  Start 2/4/19 at 05:00


Dextrose/Sodium Chloride 1,000 ml @  75 mls/hr [id number] IV  Last administered on 


2/9/19at 01:59; Admin Dose 75 MLS/HR;  Start 2/5/19 at 17:00


Propofol 100 ml @  1.227 mls/ hr Q12H IV ;  Start 2/5/19 at 21:00


Norepinephrine 250 ml @  1.875 mls/ hr TITRATE IV  Last administered on 2/8/19at


15:22; Admin Dose 7.5 MLS/HR;  Start 2/5/19 at 23:00


Fluconazole 100 ml @  100 mls/hr Q24H IVPB  Last administered on 2/8/19at 12:36;


Admin Dose 100 MLS/HR;  Start 2/6/19 at 12:00


Collagenase (Santyl) 1 applic DAILY TOP  Last administered on 2/9/19at 10:22; 


Admin Dose 1 APPLIC;  Start 2/6/19 at 11:30


IV Flush (NS 10 ml) 10 ml PRN  PRN IV IV PROTOCOL;  Start 2/6/19 at 15:00


Vancomycin HCl (Vanco Iv Per Pharmacy) VANCOMYCIN PER PHARMACY PER PROTOCOL XX ;


 Start 2/7/19 at 17:00


Vancomycin/Sodium Chloride 250 ml @  125 mls/hr Q24H IVPB  Last administered on 


2/8/19at 16:44; Admin Dose 125 MLS/HR;  Start 2/7/19 at 17:00


Miscellaneous Information (*Rx Drug Level Order Reminder*) VANCOMYCIN TROUGH 2/9


AT 1600 ONCE  ONCE XX ;  Start 2/9/19 at 16:00;  Stop 2/9/19 at 16:01


Cefepime HCl 50 ml @  100 mls/hr Q12 IVPB  Last administered on 2/9/19at 10:22; 


Admin Dose 100 MLS/HR;  Start 2/8/19 at 13:00


Potassium Phosphate 20 meq/ Sodium Chloride 254.5455 ml @  63.636 m... ONCE  


ONCE IVPB ;  Start 2/9/19 at 14:00;  Stop 2/9/19 at 17:59


Potassium Chloride 100 ml @  50 mls/hr Q2H IVPB  Last administered on 2/9/19at 


08:32; Admin Dose 50 MLS/HR;  Start 2/9/19 at 08:00;  Stop 2/9/19 at 13:59





Assessment/Plan


Assessment/Plan (Daily)


IMP:


1.  Seizure requiring intubation for airway protection now patient remains 


significantly encephalopathic MRI negative for acute bleed or CVA 


2.  Metabolic acidosis likely secondary to hypoperfusion.  Now improved


3.  Septic Shock


4.  s/p lactic acidosis 


 


RECS: 


1.  Titrate levophed to MAP > 65 mm Hg


2.  Intravenous antibiotics; de-escalate cultures


3.  Continue antiepileptics.


4.  Minimize sedation; continue to follow neuro exam


5.  DVT and GI prophylaxis.





cc 40mins.











MARCIE WRIGHT MD               Feb 9, 2019 11:20

## 2019-02-09 NOTE — CONS
Assessment/Plan


Assessment/Plan


Hospital Course


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 


p/w ams.


She was additionally reported to have a seizure... for which neurology is co


nsulted.





Most clinically consistent with new onset epilepsy.





Initial EEG was notable for GPEDs and an electrographic seizure originating in 


the R temporal region..


s/p Dilantin load, with ongoing episodic staring spells (presumed seizures) 


noted clinically..


s/p Phenobarbital load on 2/5..with resolution of episodes suspicious for 


seizure





MRI is unrevealing.


Repeat EEG is without epileptiform activity 





P:


Resume Keppra 500 BID


Cont to hold Phb and Dilantin for now


Ativan IV PRN seizure > 5 min or for cluster


Limit other sedating medications where possible


Other medical management per primary


Will follow clinically





Consultation Date/Type/Reason


Admit Date/Time


Feb 3, 2019 at 22:12


Type of Consult


Neurology


Reason for Consultation


new onset seizures


Requesting Provider:  LAURENCE CASTELLANO


Date/Time of Note


DATE: 2/9/19 


TIME: 17:26





24 HR Interval Summary


Free Text/Dictation


Continues critical care. Reportedly opening her eyes to discomfort.


Subjective hx not possible:  pt non-verbal, pt critical





Exam


Vital Signs


Vitals





Vital Signs


  Date      Temp  Pulse  Resp  B/P (MAP)   Pulse Ox  O2          O2 Flow    FiO2


Time                                                 Delivery    Rate


    2/9/19           77


     16:00


    2/9/19                 14                   100                           30


     15:20


    2/9/19                     98/72 (81)


     14:45


    2/9/19                                           Mechanical


     14:00                                           Ventilator


    2/9/19  98.5


     12:00


    2/5/19                                                             2.0


     09:01








Intake and Output





2/8/19 2/8/19 2/9/19





1515:00


23:00


07:00





IntakeIntake Total


995.00 ml


1457.451 ml


1044.377 ml





OutputOutput Total


1125 ml


650 ml


200 ml





BalanceBalance


-130.00 ml


807.451 ml


844.377 ml














Exam


PE:


Gen Appearance: No Apparent Distress


HEENT: Intubated


Cardiovascular: Regular rate


Abdomen: Soft


Extremities: Dry





NE:


The patient was obtunded. Grimaced to noxious stimuli





Cranial nerve examination was limited by mental status. Pupils were equal and 


reactive to light. There was no afferent pupillary defect. Funduscopic 


examination was limited. Face was grossly symmetric, w/ present corneal and 


cough reflexes.





Tone was normal. Muscle bulk was diminished. I did not see fasciculations. The 


patient minimally withdrew to noxious stimulation in her RUE and LLE.





Coordination and gait testing was limited by mental status.





Arm and leg reflexes were within normal limits and symmetric. Richmond's sign was


absent. Plantar responses were flexor.











JULIETA SINGH NP           Feb 9, 2019 17:29

## 2019-02-09 NOTE — PN
Date/Time of Note


Date/Time of Note


DATE: 2/9/19 


TIME: 08:41





Assessment/Plan


VTE Prophylaxis


Risk score (from Ns)>0 risk:  2


SCD applied (from Ns):  Yes


Pharmacological prophylaxis:  heparin





Lines/Catheters


IV Catheter Type (from Nrs):  PICC Line


Central line still needed:  Yes


Urinary Cath still in place:  Yes


Reason Cath still needed:  urinary retention





Assessment/Plan


Problems:  


(1) Mental status alteration


Status:  Acute


Comment:  This is a combination of her having been with epilepsy at the time of 


admission with a chronic encephalopathy as evidenced by the EEGs, and her 


metabolic status which includes sepsis.  Do maximum that we can to support her. 


However long-term prognosis for meaningful recovery has profound limitations.


Qualifiers:  


   Altered mental status type:  coma  Coma depth:  Simone coma 3-8  Coma 


timing:  in the field (EMT or ambulance)  Qualified Codes:  R40.2431 - Simone 


coma scale score 3-8, in the field [emt or ambulance]


(2) Seizure disorder


Status:  Acute


Comment:  The best of our information this is an acute onset of a seizure 


disorder.  Neurology is guiding us with anti-epilepsy medicines which is 


modestly tricky due to the concerns of further declining her limited mental 


status.





(3) Dementia


Status:  Chronic


Comment:  This apparently has been going on for some time and is postulated to 


be due to the sequelae of West Nile virus infection.


Qualifiers:  


   Dementia type:  unspecified type  Dementia behavioral disturbance:  without 


behavioral disturbance  Qualified Codes:  F03.90 - Unspecified dementia without 


behavioral disturbance


(4) Bedbound


Status:  Chronic


Comment:  Noted.  Wound and skin management





(5) SIRS (systemic inflammatory response syndrome)


Status:  Acute


Comment:  Remains on pressor support at this time.  Based on the results of the 


cultures she is on appropriate antibiotic therapy and infectious diseases giving


guidance





(6) Hyperlipidemia


Status:  Chronic


Comment:  Noted.  Not of an urgent indication to intervene


Qualifiers:  


   Hyperlipidemia type:  pure hypercholesterolemia  Qualified Codes:  E78.00 - 


Pure hypercholesterolemia, unspecified


(7) Acute hypokalemia


Status:  Acute


Comment:  Receiving supplementation now.





(8) Essential hypertension


Status:  Chronic


Comment:  Hypotensive and on pressors at this time





(9) History of West Nile virus infection


Status:  Chronic


Comment:  Noted.  This is historical and we do not have lab data support the





(10) Staphylococcal sepsis


Status:  Acute


Comment:  On appropriate antibiotic therapy.  Please note the blood cultures 


were 2 out of 2 positive





(11) Pseudomonas aeruginosa infection


Status:  Acute


Comment:  Patient is on appropriate antibiotic therapy based on cultures and 


sensitivities





(12) Hypophosphatemia


Status:  Acute


Comment:  Try to replete





Result Diagram:  


2/9/19 0400                                                                     


          2/9/19 0400





Results 24hrs





Laboratory Tests


               Test
                                2/9/19
04:00


               White Blood Count                          10.5


               Red Blood Count                           3.04  L


               Hemoglobin                                 9.6  L


               Hematocrit                                27.6  L


               Mean Corpuscular Volume                    90.8


               Mean Corpuscular Hemoglobin                31.6


               Mean Corpuscular Hemoglobin
Concent       34.8  



               Red Cell Distribution Width                13.0


               Platelet Count                              160


               Mean Platelet Volume                        9.6


               Immature Granulocytes %                  0.500  H


               Neutrophils %                              74.6


               Lymphocytes %                              8.5  L


               Monocytes %                                10.7


               Eosinophils %                               5.3


               Basophils %                                 0.4


               Nucleated Red Blood Cells %                 0.0


               Immature Granulocytes #                  0.050  H


               Neutrophils #                              7.9  H


               Lymphocytes #                               0.9


               Monocytes #                                1.1  H


               Eosinophils #                              0.6  H


               Basophils #                                 0.0


               Nucleated Red Blood Cells #                 0.0


               Sodium Level                               133  L


               Potassium Level                            3.0  L


               Chloride Level                              103


               Carbon Dioxide Level                         26


               Anion Gap                                    4  L


               Blood Urea Nitrogen                          10


               Creatinine                                 0.50


               Est Glomerular Filtrat Rate
mL/min   > 60  



               Glucose Level                               157


               Calcium Level                              7.8  L


               Phosphorus Level                           1.6  L


               Magnesium Level                             2.1








Subjective


24 Hr Interval Summary


Subjective hx not possible:  pt non-verbal, pt critical status


ENT:  other (Endotracheal intubation and orogastric tube feeding)





Exam/Review of Systems


Exam


Vitals





Vital Signs


  Date      Temp  Pulse  Resp  B/P (MAP)   Pulse Ox  O2          O2 Flow    FiO2


Time                                                 Delivery    Rate


    2/9/19           73


     08:00


    2/9/19                 14  90/68 (75)       100


     06:30


    2/9/19                                           Mechanical


     06:00                                           Ventilator


    2/9/19                                                                    30


     05:07


    2/9/19  97.7


     04:00


    2/5/19                                                             2.0


     09:01








Intake and Output





2/8/19 2/8/19 2/9/19





1515:00


23:00


07:00





IntakeIntake Total


995.00 ml


1457.451 ml


1004.377 ml





OutputOutput Total


1125 ml


650 ml


200 ml





BalanceBalance


-130.00 ml


807.451 ml


804.377 ml











Constitutional:  non-verbal


Psych:  other (Nonresponsive)


ENMT:  intubated, other (Oral gastric tube for feeding)


Cardiovascular:  regular rate and rhythm, nl pulses


Gastrointestinal:  soft, nl liver, spleen, non-tender


Musculoskeletal:  nl extremities to inspection, nl gait and stance


Extremities:  normal pulses


Neurological:  other (Nonresponsive)





Results


Results 24hrs





Laboratory Tests


               Test
                                2/9/19
04:00


               White Blood Count                          10.5


               Red Blood Count                           3.04  L


               Hemoglobin                                 9.6  L


               Hematocrit                                27.6  L


               Mean Corpuscular Volume                    90.8


               Mean Corpuscular Hemoglobin                31.6


               Mean Corpuscular Hemoglobin
Concent       34.8  



               Red Cell Distribution Width                13.0


               Platelet Count                              160


               Mean Platelet Volume                        9.6


               Immature Granulocytes %                  0.500  H


               Neutrophils %                              74.6


               Lymphocytes %                              8.5  L


               Monocytes %                                10.7


               Eosinophils %                               5.3


               Basophils %                                 0.4


               Nucleated Red Blood Cells %                 0.0


               Immature Granulocytes #                  0.050  H


               Neutrophils #                              7.9  H


               Lymphocytes #                               0.9


               Monocytes #                                1.1  H


               Eosinophils #                              0.6  H


               Basophils #                                 0.0


               Nucleated Red Blood Cells #                 0.0


               Sodium Level                               133  L


               Potassium Level                            3.0  L


               Chloride Level                              103


               Carbon Dioxide Level                         26


               Anion Gap                                    4  L


               Blood Urea Nitrogen                          10


               Creatinine                                 0.50


               Est Glomerular Filtrat Rate
mL/min   > 60  



               Glucose Level                               157


               Calcium Level                              7.8  L


               Phosphorus Level                           1.6  L


               Magnesium Level                             2.1








Medications


Medication





Current Medications


IV Flush (NS 3 ml) 3 ml PER PROTOCOL IV ;  Start 2/4/19 at 03:30


Ondansetron HCl (Zofran Inj) 4 mg Q6H  PRN IV NAUSEA/VOMITING;  Start 2/4/19 at 


03:30


Acetaminophen (Tylenol Tab) 650 mg Q6H  PRN PO .PAIN 1-3 OR TEMP;  Start 2/4/19 


at 03:30


Docusate Sodium (Colace) 100 mg Q12H  PRN PO .CONSTIPATION;  Start 2/4/19 at 03


:30


Magnesium Hydroxide (Milk Of Mag) 30 ml DAILY  PRN PO .CONSTIPATION;  Start 


2/4/19 at 03:30


Pantoprazole (Protonix Iv) 40 mg DAILY@06 IV  Last administered on 2/9/19at 


05:09; Admin Dose 40 MG;  Start 2/4/19 at 06:00


Heparin Sodium (Porcine) (Heparin  (5000 Units/1ml)) 5,000 unit Q12 SC  Last adm


inistered on 2/8/19at 21:07; Admin Dose 5,000 UNIT;  Start 2/4/19 at 09:00


Albuterol/ Ipratropium (Duoneb) 3 ml Q4H RESP THERAPY  PRN HHN SHORTNESS OF 


BREATH;  Start 2/4/19 at 03:30


Hydralazine HCl (Apresoline) 10 mg Q6H  PRN IV ELEVATED BLOOD PRESSURE;  Start 


2/4/19 at 03:30


Nitroglycerin (Nitroglycerin (Sl Tab) 0.4 Mg) 1 tab Q5M  PRN SL ANGINA;  Start 


2/4/19 at 03:30


Lorazepam (Ativan) 1 mg Q2H  PRN IV SEIZURES Last administered on 2/4/19at 


03:57; Admin Dose 1 MG;  Start 2/4/19 at 03:30


Miscellaneous Information (Pending Santyl Order For Wound Care) This patient 


ha... PRN  PRN XX WOUND CARE;  Start 2/4/19 at 05:00


Dextrose/Sodium Chloride 1,000 ml @  75 mls/hr [id number] IV  Last administered on 


2/9/19at 01:59; Admin Dose 75 MLS/HR;  Start 2/5/19 at 17:00


Propofol 100 ml @  1.227 mls/ hr Q12H IV ;  Start 2/5/19 at 21:00


Norepinephrine 250 ml @  1.875 mls/ hr TITRATE IV  Last administered on 2/8/19at


15:22; Admin Dose 7.5 MLS/HR;  Start 2/5/19 at 23:00


Fluconazole 100 ml @  100 mls/hr Q24H IVPB  Last administered on 2/8/19at 12:36;


Admin Dose 100 MLS/HR;  Start 2/6/19 at 12:00


Collagenase (Santyl) 1 applic DAILY TOP  Last administered on 2/8/19at 08:32; 


Admin Dose 1 APPLIC;  Start 2/6/19 at 11:30


IV Flush (NS 10 ml) 10 ml PRN  PRN IV IV PROTOCOL;  Start 2/6/19 at 15:00


Vancomycin HCl (Vanco Iv Per Pharmacy) VANCOMYCIN PER PHARMACY PER PROTOCOL XX ;


 Start 2/7/19 at 17:00


Vancomycin/Sodium Chloride 250 ml @  125 mls/hr Q24H IVPB  Last administered on 


2/8/19at 16:44; Admin Dose 125 MLS/HR;  Start 2/7/19 at 17:00


Miscellaneous Information (*Rx Drug Level Order Reminder*) VANCOMYCIN TROUGH 2/9


AT 1600 ONCE  ONCE XX ;  Start 2/9/19 at 16:00;  Stop 2/9/19 at 16:01


Cefepime HCl 50 ml @  100 mls/hr Q12 IVPB  Last administered on 2/8/19at 21:06; 


Admin Dose 100 MLS/HR;  Start 2/8/19 at 13:00


Potassium Phosphate 20 meq/ Sodium Chloride 254.5455 ml @  63.636 m... ONCE  


ONCE IVPB ;  Start 2/9/19 at 14:00;  Stop 2/9/19 at 17:59


Potassium Chloride 100 ml @  50 mls/hr Q2H IVPB  Last administered on 2/9/19at 


08:32; Admin Dose 50 MLS/HR;  Start 2/9/19 at 08:00;  Stop 2/9/19 at 13:59











EULA PHILLIP MD               Feb 9, 2019 08:51

## 2019-02-09 NOTE — CONS
Assessment/Plan


Assessment/Plan


Hospital Course (Demo Recall)


Patient is more responsive Levophed titrated down to 2 mcg, she is in no 


distress





Temperature 98.3 pulse 60 respirations 14 blood pressure 93/71 saturation 100 on


vent





WBC 10.5 H&H 9.6 and 27.6 platelets 160 no shift BUN 10 creatinine 0.50





Microbiology: Urine culture grew Streptococcus, sputum culture growing 


pseudomonas aeruginosa





Antimicrobials: Cefepime, vancomycin, fluconazole





Indwelling: Endotracheal tube orogastric tube Daivla catheter left upper 


extremity PICC line





Physical examination: Well-developed chronically ill-appearing middle-aged woman


who is intubated in no distress.  Head atraumatic normocephalic.  Sclera 


nonicteric.  Neck is supple.  Chest rise symmetrical, breath sounds diminished 


bases.  Heart: S1-S2.  Abdomen soft bowel sounds present, hypoactive.  Extremit


ies cyanotic with trace edema





Assessment:


1.  Septic shock


2.  Gram-positive cocci bacteremia cw contaminant


3.  Respiratory failure


4.  New onset seizures


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


5.  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


6.  Urinary tract infection





Plan: Patient is doing better, Levophed being titrated down, she is more 


responsive, we will will keep her on cefepime and discontinue other antibiotics





Consultation Date/Type/Reason


Admit Date/Time


Feb 3, 2019 at 22:12


Initial Consult Date


2/5/19


Type of Consult


id


Requesting Provider:  LAURENCE CASTELLANO


Date/Time of Note


DATE: 2/9/19 


TIME: 12:47





Exam/Review of Systems


Exam


Vitals





Vital Signs


  Date      Temp  Pulse  Resp  B/P (MAP)   Pulse Ox  O2          O2 Flow    FiO2


Time                                                 Delivery    Rate


    2/9/19           69


     12:00


    2/9/19                 14  93/71 (78)       100


     10:45


    2/9/19                                           Mechanical


     10:00                                           Ventilator


    2/9/19                                                                    30


     08:00


    2/9/19  98.3


     08:00


    2/5/19                                                             2.0


     09:01








Intake and Output





2/8/19 2/8/19 2/9/19





1515:00


23:00


07:00





IntakeIntake Total


995.00 ml


1457.451 ml


1044.377 ml





OutputOutput Total


1125 ml


650 ml


200 ml





BalanceBalance


-130.00 ml


807.451 ml


844.377 ml














Results


Result Diagram:  


2/9/19 0400 2/9/19 0400





Results 24hrs





Laboratory Tests


               Test
                                2/9/19
04:00


               White Blood Count                          10.5


               Red Blood Count                           3.04  L


               Hemoglobin                                 9.6  L


               Hematocrit                                27.6  L


               Mean Corpuscular Volume                    90.8


               Mean Corpuscular Hemoglobin                31.6


               Mean Corpuscular Hemoglobin
Concent       34.8  



               Red Cell Distribution Width                13.0


               Platelet Count                              160


               Mean Platelet Volume                        9.6


               Immature Granulocytes %                  0.500  H


               Neutrophils %                              74.6


               Lymphocytes %                              8.5  L


               Monocytes %                                10.7


               Eosinophils %                               5.3


               Basophils %                                 0.4


               Nucleated Red Blood Cells %                 0.0


               Immature Granulocytes #                  0.050  H


               Neutrophils #                              7.9  H


               Lymphocytes #                               0.9


               Monocytes #                                1.1  H


               Eosinophils #                              0.6  H


               Basophils #                                 0.0


               Nucleated Red Blood Cells #                 0.0


               Sodium Level                               133  L


               Potassium Level                            3.0  L


               Chloride Level                              103


               Carbon Dioxide Level                         26


               Anion Gap                                    4  L


               Blood Urea Nitrogen                          10


               Creatinine                                 0.50


               Est Glomerular Filtrat Rate
mL/min   > 60  



               Glucose Level                               157


               Calcium Level                              7.8  L


               Phosphorus Level                           1.6  L


               Magnesium Level                             2.1








Medications


Medication





Current Medications


IV Flush (NS 3 ml) 3 ml PER PROTOCOL IV ;  Start 2/4/19 at 03:30


Ondansetron HCl (Zofran Inj) 4 mg Q6H  PRN IV NAUSEA/VOMITING;  Start 2/4/19 at 


03:30


Acetaminophen (Tylenol Tab) 650 mg Q6H  PRN PO .PAIN 1-3 OR TEMP;  Start 2/4/19 


at 03:30


Docusate Sodium (Colace) 100 mg Q12H  PRN PO .CONSTIPATION;  Start 2/4/19 at 


03:30


Magnesium Hydroxide (Milk Of Mag) 30 ml DAILY  PRN PO .CONSTIPATION;  Start 


2/4/19 at 03:30


Pantoprazole (Protonix Iv) 40 mg DAILY@06 IV  Last administered on 2/9/19at 


05:09; Admin Dose 40 MG;  Start 2/4/19 at 06:00


Heparin Sodium (Porcine) (Heparin  (5000 Units/1ml)) 5,000 unit Q12 SC  Last 


administered on 2/9/19at 10:27; Admin Dose 5,000 UNIT;  Start 2/4/19 at 09:00


Albuterol/ Ipratropium (Duoneb) 3 ml Q4H RESP THERAPY  PRN HHN SHORTNESS OF 


BREATH;  Start 2/4/19 at 03:30


Hydralazine HCl (Apresoline) 10 mg Q6H  PRN IV ELEVATED BLOOD PRESSURE;  Start 


2/4/19 at 03:30


Nitroglycerin (Nitroglycerin (Sl Tab) 0.4 Mg) 1 tab Q5M  PRN SL ANGINA;  Start 


2/4/19 at 03:30


Lorazepam (Ativan) 1 mg Q2H  PRN IV SEIZURES Last administered on 2/4/19at 


03:57; Admin Dose 1 MG;  Start 2/4/19 at 03:30


Miscellaneous Information (Pending Santyl Order For Wound Care) This patient 


ha... PRN  PRN XX WOUND CARE;  Start 2/4/19 at 05:00


Dextrose/Sodium Chloride 1,000 ml @  75 mls/hr [id number] IV  Last administered on 


2/9/19at 01:59; Admin Dose 75 MLS/HR;  Start 2/5/19 at 17:00


Propofol 100 ml @  1.227 mls/ hr Q12H IV ;  Start 2/5/19 at 21:00


Norepinephrine 250 ml @  1.875 mls/ hr TITRATE IV  Last administered on 2/8/19at


15:22; Admin Dose 7.5 MLS/HR;  Start 2/5/19 at 23:00


Fluconazole 100 ml @  100 mls/hr Q24H IVPB  Last administered on 2/9/19at 12:29;


Admin Dose 100 MLS/HR;  Start 2/6/19 at 12:00


Collagenase (Santyl) 1 applic DAILY TOP  Last administered on 2/9/19at 10:22; 


Admin Dose 1 APPLIC;  Start 2/6/19 at 11:30


IV Flush (NS 10 ml) 10 ml PRN  PRN IV IV PROTOCOL;  Start 2/6/19 at 15:00


Vancomycin HCl (Vanco Iv Per Pharmacy) VANCOMYCIN PER PHARMACY PER PROTOCOL XX ;


 Start 2/7/19 at 17:00


Vancomycin/Sodium Chloride 250 ml @  125 mls/hr Q24H IVPB  Last administered on 


2/8/19at 16:44; Admin Dose 125 MLS/HR;  Start 2/7/19 at 17:00


Miscellaneous Information (*Rx Drug Level Order Reminder*) VANCOMYCIN TROUGH 2/9


AT 1600 ONCE  ONCE XX ;  Start 2/9/19 at 16:00;  Stop 2/9/19 at 16:01


Cefepime HCl 50 ml @  100 mls/hr Q12 IVPB  Last administered on 2/9/19at 10:22; 


Admin Dose 100 MLS/HR;  Start 2/8/19 at 13:00


Potassium Phosphate 20 meq/ Sodium Chloride 254.5455 ml @  63.636 m... ONCE  


ONCE IVPB ;  Start 2/9/19 at 14:00;  Stop 2/9/19 at 17:59


Potassium Chloride 100 ml @  50 mls/hr Q2H IVPB  Last administered on 2/9/19at 


12:28; Admin Dose 50 MLS/HR;  Start 2/9/19 at 08:00;  Stop 2/9/19 at 13:59











RUKHSANA ZEPEDA NP             Feb 9, 2019 12:48

## 2019-02-10 VITALS — DIASTOLIC BLOOD PRESSURE: 74 MMHG | HEART RATE: 79 BPM | SYSTOLIC BLOOD PRESSURE: 104 MMHG | RESPIRATION RATE: 11 BRPM

## 2019-02-10 VITALS — DIASTOLIC BLOOD PRESSURE: 65 MMHG | SYSTOLIC BLOOD PRESSURE: 94 MMHG | RESPIRATION RATE: 15 BRPM | HEART RATE: 89 BPM

## 2019-02-10 VITALS — RESPIRATION RATE: 14 BRPM | HEART RATE: 82 BPM | SYSTOLIC BLOOD PRESSURE: 91 MMHG | DIASTOLIC BLOOD PRESSURE: 68 MMHG

## 2019-02-10 VITALS — RESPIRATION RATE: 16 BRPM | HEART RATE: 87 BPM | DIASTOLIC BLOOD PRESSURE: 66 MMHG | SYSTOLIC BLOOD PRESSURE: 91 MMHG

## 2019-02-10 VITALS — HEART RATE: 73 BPM | DIASTOLIC BLOOD PRESSURE: 65 MMHG | SYSTOLIC BLOOD PRESSURE: 92 MMHG | RESPIRATION RATE: 14 BRPM

## 2019-02-10 VITALS — RESPIRATION RATE: 14 BRPM | DIASTOLIC BLOOD PRESSURE: 70 MMHG | SYSTOLIC BLOOD PRESSURE: 99 MMHG | HEART RATE: 89 BPM

## 2019-02-10 VITALS — DIASTOLIC BLOOD PRESSURE: 63 MMHG | HEART RATE: 84 BPM | SYSTOLIC BLOOD PRESSURE: 88 MMHG | RESPIRATION RATE: 15 BRPM

## 2019-02-10 VITALS — SYSTOLIC BLOOD PRESSURE: 97 MMHG | DIASTOLIC BLOOD PRESSURE: 67 MMHG | RESPIRATION RATE: 14 BRPM | HEART RATE: 75 BPM

## 2019-02-10 VITALS — HEART RATE: 90 BPM | DIASTOLIC BLOOD PRESSURE: 63 MMHG | RESPIRATION RATE: 14 BRPM | SYSTOLIC BLOOD PRESSURE: 90 MMHG

## 2019-02-10 VITALS — HEART RATE: 84 BPM | SYSTOLIC BLOOD PRESSURE: 90 MMHG | RESPIRATION RATE: 19 BRPM | DIASTOLIC BLOOD PRESSURE: 67 MMHG

## 2019-02-10 VITALS — RESPIRATION RATE: 14 BRPM | SYSTOLIC BLOOD PRESSURE: 95 MMHG | HEART RATE: 74 BPM | DIASTOLIC BLOOD PRESSURE: 68 MMHG

## 2019-02-10 VITALS — HEART RATE: 78 BPM | RESPIRATION RATE: 15 BRPM | DIASTOLIC BLOOD PRESSURE: 75 MMHG | SYSTOLIC BLOOD PRESSURE: 109 MMHG

## 2019-02-10 VITALS — SYSTOLIC BLOOD PRESSURE: 98 MMHG | RESPIRATION RATE: 14 BRPM | DIASTOLIC BLOOD PRESSURE: 74 MMHG | HEART RATE: 81 BPM

## 2019-02-10 VITALS — HEART RATE: 80 BPM | DIASTOLIC BLOOD PRESSURE: 67 MMHG | SYSTOLIC BLOOD PRESSURE: 94 MMHG | RESPIRATION RATE: 14 BRPM

## 2019-02-10 VITALS — SYSTOLIC BLOOD PRESSURE: 110 MMHG | DIASTOLIC BLOOD PRESSURE: 75 MMHG | RESPIRATION RATE: 17 BRPM | HEART RATE: 86 BPM

## 2019-02-10 VITALS — DIASTOLIC BLOOD PRESSURE: 65 MMHG | SYSTOLIC BLOOD PRESSURE: 99 MMHG | RESPIRATION RATE: 14 BRPM | HEART RATE: 78 BPM

## 2019-02-10 VITALS — HEART RATE: 88 BPM | SYSTOLIC BLOOD PRESSURE: 95 MMHG | RESPIRATION RATE: 15 BRPM | DIASTOLIC BLOOD PRESSURE: 68 MMHG

## 2019-02-10 VITALS — HEART RATE: 77 BPM | RESPIRATION RATE: 14 BRPM | SYSTOLIC BLOOD PRESSURE: 101 MMHG | DIASTOLIC BLOOD PRESSURE: 72 MMHG

## 2019-02-10 VITALS — DIASTOLIC BLOOD PRESSURE: 74 MMHG | RESPIRATION RATE: 14 BRPM | HEART RATE: 85 BPM | SYSTOLIC BLOOD PRESSURE: 105 MMHG

## 2019-02-10 VITALS — DIASTOLIC BLOOD PRESSURE: 81 MMHG | SYSTOLIC BLOOD PRESSURE: 112 MMHG | RESPIRATION RATE: 19 BRPM | HEART RATE: 87 BPM

## 2019-02-10 VITALS — SYSTOLIC BLOOD PRESSURE: 99 MMHG | HEART RATE: 96 BPM | RESPIRATION RATE: 14 BRPM | DIASTOLIC BLOOD PRESSURE: 65 MMHG

## 2019-02-10 VITALS — SYSTOLIC BLOOD PRESSURE: 96 MMHG | DIASTOLIC BLOOD PRESSURE: 66 MMHG | HEART RATE: 94 BPM | RESPIRATION RATE: 15 BRPM

## 2019-02-10 VITALS — RESPIRATION RATE: 14 BRPM | HEART RATE: 82 BPM | DIASTOLIC BLOOD PRESSURE: 77 MMHG | SYSTOLIC BLOOD PRESSURE: 107 MMHG

## 2019-02-10 VITALS — HEART RATE: 83 BPM | SYSTOLIC BLOOD PRESSURE: 94 MMHG | RESPIRATION RATE: 16 BRPM | DIASTOLIC BLOOD PRESSURE: 67 MMHG

## 2019-02-10 VITALS — SYSTOLIC BLOOD PRESSURE: 96 MMHG | RESPIRATION RATE: 14 BRPM | HEART RATE: 74 BPM | DIASTOLIC BLOOD PRESSURE: 73 MMHG

## 2019-02-10 VITALS — DIASTOLIC BLOOD PRESSURE: 67 MMHG | SYSTOLIC BLOOD PRESSURE: 95 MMHG | RESPIRATION RATE: 14 BRPM | HEART RATE: 84 BPM

## 2019-02-10 VITALS — RESPIRATION RATE: 14 BRPM | DIASTOLIC BLOOD PRESSURE: 66 MMHG | SYSTOLIC BLOOD PRESSURE: 94 MMHG | HEART RATE: 83 BPM

## 2019-02-10 VITALS — RESPIRATION RATE: 14 BRPM | DIASTOLIC BLOOD PRESSURE: 72 MMHG | HEART RATE: 80 BPM | SYSTOLIC BLOOD PRESSURE: 101 MMHG

## 2019-02-10 VITALS — RESPIRATION RATE: 15 BRPM | DIASTOLIC BLOOD PRESSURE: 60 MMHG | HEART RATE: 84 BPM | SYSTOLIC BLOOD PRESSURE: 89 MMHG

## 2019-02-10 VITALS — HEART RATE: 78 BPM | SYSTOLIC BLOOD PRESSURE: 84 MMHG | DIASTOLIC BLOOD PRESSURE: 73 MMHG | RESPIRATION RATE: 14 BRPM

## 2019-02-10 VITALS — HEART RATE: 93 BPM | SYSTOLIC BLOOD PRESSURE: 104 MMHG | DIASTOLIC BLOOD PRESSURE: 75 MMHG | RESPIRATION RATE: 19 BRPM

## 2019-02-10 VITALS — DIASTOLIC BLOOD PRESSURE: 60 MMHG | HEART RATE: 76 BPM | RESPIRATION RATE: 14 BRPM | SYSTOLIC BLOOD PRESSURE: 90 MMHG

## 2019-02-10 VITALS — DIASTOLIC BLOOD PRESSURE: 70 MMHG | RESPIRATION RATE: 16 BRPM | HEART RATE: 95 BPM | SYSTOLIC BLOOD PRESSURE: 99 MMHG

## 2019-02-10 VITALS — RESPIRATION RATE: 14 BRPM | DIASTOLIC BLOOD PRESSURE: 60 MMHG | SYSTOLIC BLOOD PRESSURE: 87 MMHG | HEART RATE: 85 BPM

## 2019-02-10 VITALS — SYSTOLIC BLOOD PRESSURE: 98 MMHG | RESPIRATION RATE: 16 BRPM | HEART RATE: 82 BPM | DIASTOLIC BLOOD PRESSURE: 69 MMHG

## 2019-02-10 VITALS — SYSTOLIC BLOOD PRESSURE: 103 MMHG | HEART RATE: 79 BPM | RESPIRATION RATE: 14 BRPM | DIASTOLIC BLOOD PRESSURE: 70 MMHG

## 2019-02-10 VITALS — DIASTOLIC BLOOD PRESSURE: 74 MMHG | HEART RATE: 100 BPM | RESPIRATION RATE: 17 BRPM | SYSTOLIC BLOOD PRESSURE: 105 MMHG

## 2019-02-10 VITALS — SYSTOLIC BLOOD PRESSURE: 93 MMHG | HEART RATE: 76 BPM | DIASTOLIC BLOOD PRESSURE: 67 MMHG | RESPIRATION RATE: 14 BRPM

## 2019-02-10 VITALS — RESPIRATION RATE: 16 BRPM | DIASTOLIC BLOOD PRESSURE: 66 MMHG | SYSTOLIC BLOOD PRESSURE: 95 MMHG | HEART RATE: 97 BPM

## 2019-02-10 VITALS — HEART RATE: 93 BPM | SYSTOLIC BLOOD PRESSURE: 98 MMHG | DIASTOLIC BLOOD PRESSURE: 67 MMHG | RESPIRATION RATE: 14 BRPM

## 2019-02-10 VITALS — RESPIRATION RATE: 16 BRPM | HEART RATE: 96 BPM | SYSTOLIC BLOOD PRESSURE: 103 MMHG | DIASTOLIC BLOOD PRESSURE: 73 MMHG

## 2019-02-10 VITALS — SYSTOLIC BLOOD PRESSURE: 108 MMHG | HEART RATE: 84 BPM | DIASTOLIC BLOOD PRESSURE: 65 MMHG | RESPIRATION RATE: 25 BRPM

## 2019-02-10 VITALS — HEART RATE: 97 BPM | SYSTOLIC BLOOD PRESSURE: 101 MMHG | DIASTOLIC BLOOD PRESSURE: 68 MMHG | RESPIRATION RATE: 15 BRPM

## 2019-02-10 VITALS — DIASTOLIC BLOOD PRESSURE: 68 MMHG | RESPIRATION RATE: 14 BRPM | HEART RATE: 89 BPM | SYSTOLIC BLOOD PRESSURE: 96 MMHG

## 2019-02-10 VITALS — RESPIRATION RATE: 14 BRPM | DIASTOLIC BLOOD PRESSURE: 67 MMHG | SYSTOLIC BLOOD PRESSURE: 99 MMHG | HEART RATE: 94 BPM

## 2019-02-10 VITALS — HEART RATE: 83 BPM | DIASTOLIC BLOOD PRESSURE: 72 MMHG | SYSTOLIC BLOOD PRESSURE: 94 MMHG | RESPIRATION RATE: 14 BRPM

## 2019-02-10 VITALS — HEART RATE: 80 BPM | DIASTOLIC BLOOD PRESSURE: 70 MMHG | RESPIRATION RATE: 15 BRPM | SYSTOLIC BLOOD PRESSURE: 97 MMHG

## 2019-02-10 VITALS — SYSTOLIC BLOOD PRESSURE: 89 MMHG | RESPIRATION RATE: 15 BRPM | HEART RATE: 85 BPM | DIASTOLIC BLOOD PRESSURE: 61 MMHG

## 2019-02-10 VITALS — HEART RATE: 81 BPM | SYSTOLIC BLOOD PRESSURE: 104 MMHG | RESPIRATION RATE: 14 BRPM | DIASTOLIC BLOOD PRESSURE: 73 MMHG

## 2019-02-10 VITALS — DIASTOLIC BLOOD PRESSURE: 72 MMHG | SYSTOLIC BLOOD PRESSURE: 100 MMHG | HEART RATE: 83 BPM | RESPIRATION RATE: 14 BRPM

## 2019-02-10 VITALS — RESPIRATION RATE: 14 BRPM | HEART RATE: 85 BPM | DIASTOLIC BLOOD PRESSURE: 63 MMHG | SYSTOLIC BLOOD PRESSURE: 90 MMHG

## 2019-02-10 VITALS — HEART RATE: 86 BPM | DIASTOLIC BLOOD PRESSURE: 65 MMHG | RESPIRATION RATE: 14 BRPM | SYSTOLIC BLOOD PRESSURE: 92 MMHG

## 2019-02-10 VITALS — DIASTOLIC BLOOD PRESSURE: 71 MMHG | RESPIRATION RATE: 14 BRPM | SYSTOLIC BLOOD PRESSURE: 99 MMHG | HEART RATE: 79 BPM

## 2019-02-10 VITALS — DIASTOLIC BLOOD PRESSURE: 63 MMHG | HEART RATE: 88 BPM | SYSTOLIC BLOOD PRESSURE: 87 MMHG | RESPIRATION RATE: 15 BRPM

## 2019-02-10 VITALS — SYSTOLIC BLOOD PRESSURE: 105 MMHG | DIASTOLIC BLOOD PRESSURE: 71 MMHG | HEART RATE: 83 BPM | RESPIRATION RATE: 14 BRPM

## 2019-02-10 VITALS — SYSTOLIC BLOOD PRESSURE: 82 MMHG | HEART RATE: 75 BPM | RESPIRATION RATE: 15 BRPM | DIASTOLIC BLOOD PRESSURE: 57 MMHG

## 2019-02-10 VITALS — DIASTOLIC BLOOD PRESSURE: 70 MMHG | RESPIRATION RATE: 14 BRPM | HEART RATE: 83 BPM | SYSTOLIC BLOOD PRESSURE: 96 MMHG

## 2019-02-10 VITALS — HEART RATE: 75 BPM | RESPIRATION RATE: 14 BRPM | DIASTOLIC BLOOD PRESSURE: 68 MMHG | SYSTOLIC BLOOD PRESSURE: 90 MMHG

## 2019-02-10 VITALS — SYSTOLIC BLOOD PRESSURE: 98 MMHG | RESPIRATION RATE: 14 BRPM | DIASTOLIC BLOOD PRESSURE: 64 MMHG | HEART RATE: 76 BPM

## 2019-02-10 VITALS — DIASTOLIC BLOOD PRESSURE: 74 MMHG | HEART RATE: 85 BPM | RESPIRATION RATE: 14 BRPM | SYSTOLIC BLOOD PRESSURE: 106 MMHG

## 2019-02-10 VITALS — RESPIRATION RATE: 15 BRPM | HEART RATE: 81 BPM | SYSTOLIC BLOOD PRESSURE: 100 MMHG | DIASTOLIC BLOOD PRESSURE: 71 MMHG

## 2019-02-10 VITALS — SYSTOLIC BLOOD PRESSURE: 95 MMHG | RESPIRATION RATE: 16 BRPM | DIASTOLIC BLOOD PRESSURE: 64 MMHG | HEART RATE: 97 BPM

## 2019-02-10 VITALS — DIASTOLIC BLOOD PRESSURE: 60 MMHG | SYSTOLIC BLOOD PRESSURE: 92 MMHG | HEART RATE: 72 BPM | RESPIRATION RATE: 14 BRPM

## 2019-02-10 VITALS — DIASTOLIC BLOOD PRESSURE: 73 MMHG | HEART RATE: 86 BPM | RESPIRATION RATE: 12 BRPM | SYSTOLIC BLOOD PRESSURE: 100 MMHG

## 2019-02-10 VITALS — RESPIRATION RATE: 15 BRPM | HEART RATE: 92 BPM | DIASTOLIC BLOOD PRESSURE: 68 MMHG | SYSTOLIC BLOOD PRESSURE: 94 MMHG

## 2019-02-10 VITALS — RESPIRATION RATE: 14 BRPM

## 2019-02-10 VITALS — HEART RATE: 87 BPM | DIASTOLIC BLOOD PRESSURE: 66 MMHG | RESPIRATION RATE: 14 BRPM | SYSTOLIC BLOOD PRESSURE: 89 MMHG

## 2019-02-10 VITALS — HEART RATE: 93 BPM | RESPIRATION RATE: 15 BRPM | SYSTOLIC BLOOD PRESSURE: 97 MMHG | DIASTOLIC BLOOD PRESSURE: 67 MMHG

## 2019-02-10 VITALS — DIASTOLIC BLOOD PRESSURE: 69 MMHG | SYSTOLIC BLOOD PRESSURE: 105 MMHG | RESPIRATION RATE: 17 BRPM | HEART RATE: 79 BPM

## 2019-02-10 VITALS — RESPIRATION RATE: 14 BRPM | DIASTOLIC BLOOD PRESSURE: 66 MMHG | SYSTOLIC BLOOD PRESSURE: 91 MMHG | HEART RATE: 88 BPM

## 2019-02-10 VITALS — HEART RATE: 89 BPM | RESPIRATION RATE: 18 BRPM | SYSTOLIC BLOOD PRESSURE: 111 MMHG | DIASTOLIC BLOOD PRESSURE: 76 MMHG

## 2019-02-10 VITALS — DIASTOLIC BLOOD PRESSURE: 67 MMHG | SYSTOLIC BLOOD PRESSURE: 95 MMHG | HEART RATE: 84 BPM | RESPIRATION RATE: 14 BRPM

## 2019-02-10 VITALS — SYSTOLIC BLOOD PRESSURE: 93 MMHG | RESPIRATION RATE: 14 BRPM | DIASTOLIC BLOOD PRESSURE: 64 MMHG | HEART RATE: 76 BPM

## 2019-02-10 VITALS — SYSTOLIC BLOOD PRESSURE: 100 MMHG | DIASTOLIC BLOOD PRESSURE: 72 MMHG | HEART RATE: 82 BPM | RESPIRATION RATE: 14 BRPM

## 2019-02-10 VITALS — HEART RATE: 81 BPM | SYSTOLIC BLOOD PRESSURE: 93 MMHG | RESPIRATION RATE: 14 BRPM | DIASTOLIC BLOOD PRESSURE: 69 MMHG

## 2019-02-10 VITALS — RESPIRATION RATE: 14 BRPM | DIASTOLIC BLOOD PRESSURE: 68 MMHG | HEART RATE: 84 BPM | SYSTOLIC BLOOD PRESSURE: 98 MMHG

## 2019-02-10 VITALS — HEART RATE: 82 BPM | RESPIRATION RATE: 15 BRPM | SYSTOLIC BLOOD PRESSURE: 90 MMHG | DIASTOLIC BLOOD PRESSURE: 62 MMHG

## 2019-02-10 VITALS — RESPIRATION RATE: 15 BRPM | SYSTOLIC BLOOD PRESSURE: 96 MMHG | HEART RATE: 87 BPM | DIASTOLIC BLOOD PRESSURE: 68 MMHG

## 2019-02-10 VITALS — SYSTOLIC BLOOD PRESSURE: 101 MMHG | DIASTOLIC BLOOD PRESSURE: 76 MMHG | RESPIRATION RATE: 19 BRPM | HEART RATE: 84 BPM

## 2019-02-10 VITALS — SYSTOLIC BLOOD PRESSURE: 100 MMHG | HEART RATE: 88 BPM | DIASTOLIC BLOOD PRESSURE: 70 MMHG | RESPIRATION RATE: 15 BRPM

## 2019-02-10 VITALS — DIASTOLIC BLOOD PRESSURE: 70 MMHG | SYSTOLIC BLOOD PRESSURE: 97 MMHG | HEART RATE: 88 BPM | RESPIRATION RATE: 14 BRPM

## 2019-02-10 VITALS — HEART RATE: 77 BPM | DIASTOLIC BLOOD PRESSURE: 51 MMHG | SYSTOLIC BLOOD PRESSURE: 75 MMHG | RESPIRATION RATE: 14 BRPM

## 2019-02-10 VITALS — HEART RATE: 82 BPM | DIASTOLIC BLOOD PRESSURE: 73 MMHG | SYSTOLIC BLOOD PRESSURE: 93 MMHG | RESPIRATION RATE: 15 BRPM

## 2019-02-10 VITALS — HEART RATE: 86 BPM | RESPIRATION RATE: 16 BRPM | DIASTOLIC BLOOD PRESSURE: 60 MMHG | SYSTOLIC BLOOD PRESSURE: 88 MMHG

## 2019-02-10 VITALS — RESPIRATION RATE: 15 BRPM | SYSTOLIC BLOOD PRESSURE: 98 MMHG | DIASTOLIC BLOOD PRESSURE: 64 MMHG | HEART RATE: 84 BPM

## 2019-02-10 VITALS — RESPIRATION RATE: 15 BRPM | HEART RATE: 84 BPM | SYSTOLIC BLOOD PRESSURE: 87 MMHG | DIASTOLIC BLOOD PRESSURE: 59 MMHG

## 2019-02-10 VITALS — HEART RATE: 76 BPM | SYSTOLIC BLOOD PRESSURE: 110 MMHG | RESPIRATION RATE: 19 BRPM | DIASTOLIC BLOOD PRESSURE: 72 MMHG

## 2019-02-10 VITALS — HEART RATE: 75 BPM | DIASTOLIC BLOOD PRESSURE: 66 MMHG | RESPIRATION RATE: 14 BRPM | SYSTOLIC BLOOD PRESSURE: 96 MMHG

## 2019-02-10 VITALS — DIASTOLIC BLOOD PRESSURE: 63 MMHG | HEART RATE: 76 BPM | SYSTOLIC BLOOD PRESSURE: 95 MMHG | RESPIRATION RATE: 14 BRPM

## 2019-02-10 VITALS — SYSTOLIC BLOOD PRESSURE: 112 MMHG | RESPIRATION RATE: 17 BRPM | HEART RATE: 85 BPM | DIASTOLIC BLOOD PRESSURE: 77 MMHG

## 2019-02-10 VITALS — SYSTOLIC BLOOD PRESSURE: 94 MMHG | DIASTOLIC BLOOD PRESSURE: 62 MMHG | HEART RATE: 81 BPM | RESPIRATION RATE: 15 BRPM

## 2019-02-10 VITALS — HEART RATE: 75 BPM | RESPIRATION RATE: 14 BRPM | DIASTOLIC BLOOD PRESSURE: 62 MMHG | SYSTOLIC BLOOD PRESSURE: 86 MMHG

## 2019-02-10 VITALS — RESPIRATION RATE: 14 BRPM | DIASTOLIC BLOOD PRESSURE: 78 MMHG | SYSTOLIC BLOOD PRESSURE: 106 MMHG | HEART RATE: 79 BPM

## 2019-02-10 VITALS — RESPIRATION RATE: 15 BRPM

## 2019-02-10 LAB
ADD MAN DIFF?: NO
ALLEN TEST: (no result)
ANION GAP: 3 (ref 5–13)
ARTERIAL BASE EXCESS: -1 MMOL/L (ref -3–3)
ARTERIAL BLOOD GAS OXYGEN SAT: 98.7 MMHG (ref 95–98)
ARTERIAL COHB: 0.3 % (ref 0–3)
ARTERIAL FRACTION OF OXYHGB: 98.2 % (ref 93–99)
ARTERIAL HCO3: 22.5 MMOL/L (ref 22–26)
ARTERIAL METHB: 0.2 % (ref 0–1.5)
ARTERIAL PCO2: 32.9 MMHG (ref 35–45)
BASOPHIL #: 0 10^3/UL (ref 0–0.1)
BASOPHILS %: 0.3 % (ref 0–2)
BLOOD GAS LOW PEEP SETTING: 5 CMH2O
BLOOD GAS TIDAL VOLUME: 400 ML
BLOOD UREA NITROGEN: 10 MG/DL (ref 7–20)
CALCIUM: 7.6 MG/DL (ref 8.4–10.2)
CARBON DIOXIDE: 23 MMOL/L (ref 21–31)
CHLORIDE: 104 MMOL/L (ref 97–110)
CREATININE: 0.47 MG/DL (ref 0.44–1)
EOSINOPHILS #: 0.4 10^3/UL (ref 0–0.5)
EOSINOPHILS %: 3.7 % (ref 0–7)
FIO2: 30 %
GLUCOSE: 146 MG/DL (ref 70–220)
HEMATOCRIT: 24.2 % (ref 37–47)
HEMOGLOBIN: 8 G/DL (ref 12–16)
IMMATURE GRANS #M: 0.05 10^3/UL (ref 0–0.03)
IMMATURE GRANS % (M): 0.5 % (ref 0–0.43)
LACTIC ACID: 1.1 MMOL/L (ref 0.5–2)
LYMPHOCYTES #: 1 10^3/UL (ref 0.8–2.9)
LYMPHOCYTES %: 10.3 % (ref 15–51)
MEAN CORPUSCULAR HEMOGLOBIN: 32 PG (ref 29–33)
MEAN CORPUSCULAR HGB CONC: 33.1 G/DL (ref 32–37)
MEAN CORPUSCULAR VOLUME: 96.8 FL (ref 82–101)
MEAN PLATELET VOLUME: 10.5 FL (ref 7.4–10.4)
MODE: (no result)
MONOCYTE #: 1.1 10^3/UL (ref 0.3–0.9)
MONOCYTES %: 11.8 % (ref 0–11)
NEUTROPHIL #: 6.9 10^3/UL (ref 1.6–7.5)
NEUTROPHILS %: 73.4 % (ref 39–77)
NUCLEATED RED BLOOD CELLS #: 0 10^3/UL (ref 0–0)
NUCLEATED RED BLOOD CELLS%: 0 /100WBC (ref 0–0)
O2 A-A PPRESDIFF RESPIRATORY: 11.4 MMHG (ref 7–24)
OCCULT BLOOD STOOL: NEGATIVE
PHOSPHORUS: 2.3 MG/DL (ref 2.5–4.9)
PLATELET COUNT: 151 10^3/UL (ref 140–415)
POSITIVE DIFF: (no result)
POTASSIUM: 4.6 MMOL/L (ref 3.5–5.1)
RED BLOOD COUNT: 2.5 10^6/UL (ref 4.2–5.4)
RED CELL DISTRIBUTION WIDTH: 13.4 % (ref 11.5–14.5)
SODIUM: 130 MMOL/L (ref 135–144)
WHITE BLOOD COUNT: 9.4 10^3/UL (ref 4.8–10.8)

## 2019-02-10 RX ADMIN — POTASSIUM CHLORIDE SCH MLS/HR: 2 INJECTION, SOLUTION, CONCENTRATE INTRAVENOUS at 09:54

## 2019-02-10 RX ADMIN — ASCORBIC ACID, VITAMIN A PALMITATE, CHOLECALCIFEROL, THIAMINE HYDROCHLORIDE, RIBOFLAVIN-5 PHOSPHATE SODIUM, PYRIDOXINE HYDROCHLORIDE, NIACINAMIDE, DEXPANTHENOL, ALPHA-TOCOPHEROL ACETATE, VITAMIN K1, FOLIC ACID, BIOTIN, CYANOCOBALAMIN 1 MLS/HR: 200; 3300; 200; 6; 3.6; 6; 40; 15; 10; 150; 600; 60; 5 INJECTION, SOLUTION INTRAVENOUS at 03:40

## 2019-02-10 RX ADMIN — CEFEPIME 1 MLS/HR: 1 INJECTION, POWDER, FOR SOLUTION INTRAMUSCULAR; INTRAVENOUS at 20:46

## 2019-02-10 RX ADMIN — CEFEPIME SCH MLS/HR: 1 INJECTION, POWDER, FOR SOLUTION INTRAMUSCULAR; INTRAVENOUS at 20:46

## 2019-02-10 RX ADMIN — FOLIC ACID SCH MLS/HR: 5 INJECTION, SOLUTION INTRAMUSCULAR; INTRAVENOUS; SUBCUTANEOUS at 03:40

## 2019-02-10 RX ADMIN — HEPARIN SODIUM 1 UNIT: 5000 INJECTION, SOLUTION INTRAVENOUS; SUBCUTANEOUS at 10:09

## 2019-02-10 RX ADMIN — PROPOFOL SCH MLS/HR: 10 INJECTION, EMULSION INTRAVENOUS at 09:00

## 2019-02-10 RX ADMIN — OXYTOCIN 1 MLS/HR: 10 INJECTION, SOLUTION INTRAMUSCULAR; INTRAVENOUS at 09:54

## 2019-02-10 RX ADMIN — Medication 1 MLS/HR: at 16:40

## 2019-02-10 RX ADMIN — PROPOFOL SCH MLS/HR: 10 INJECTION, EMULSION INTRAVENOUS at 21:00

## 2019-02-10 RX ADMIN — Medication SCH MLS/HR: at 16:40

## 2019-02-10 RX ADMIN — HEPARIN SODIUM SCH UNIT: 5000 INJECTION, SOLUTION INTRAVENOUS; SUBCUTANEOUS at 10:09

## 2019-02-10 RX ADMIN — PROPOFOL 1 MLS/HR: 10 INJECTION, EMULSION INTRAVENOUS at 09:00

## 2019-02-10 RX ADMIN — LEVETIRACETAM 1 MLS/HR: 5 INJECTION INTRAVENOUS at 20:46

## 2019-02-10 RX ADMIN — HEPARIN SODIUM 1 UNIT: 5000 INJECTION, SOLUTION INTRAVENOUS; SUBCUTANEOUS at 20:48

## 2019-02-10 RX ADMIN — LEVETIRACETAM 1 MLS/HR: 5 INJECTION INTRAVENOUS at 12:16

## 2019-02-10 RX ADMIN — PROPOFOL 1 MLS/HR: 10 INJECTION, EMULSION INTRAVENOUS at 21:00

## 2019-02-10 RX ADMIN — PANTOPRAZOLE SODIUM 1 MG: 40 INJECTION, POWDER, FOR SOLUTION INTRAVENOUS at 05:08

## 2019-02-10 RX ADMIN — HEPARIN SODIUM SCH UNIT: 5000 INJECTION, SOLUTION INTRAVENOUS; SUBCUTANEOUS at 20:48

## 2019-02-10 RX ADMIN — COLLAGENASE SANTYL 1 APPLIC: 250 OINTMENT TOPICAL at 09:52

## 2019-02-10 RX ADMIN — PANTOPRAZOLE SODIUM SCH MG: 40 INJECTION, POWDER, FOR SOLUTION INTRAVENOUS at 05:08

## 2019-02-10 RX ADMIN — LEVETIRACETAM SCH MLS/HR: 5 INJECTION INTRAVENOUS at 20:46

## 2019-02-10 RX ADMIN — LEVETIRACETAM SCH MLS/HR: 5 INJECTION INTRAVENOUS at 12:16

## 2019-02-10 RX ADMIN — CEFEPIME SCH MLS/HR: 1 INJECTION, POWDER, FOR SOLUTION INTRAMUSCULAR; INTRAVENOUS at 09:53

## 2019-02-10 RX ADMIN — COLLAGENASE SANTYL SCH APPLIC: 250 OINTMENT TOPICAL at 09:52

## 2019-02-10 RX ADMIN — CEFEPIME 1 MLS/HR: 1 INJECTION, POWDER, FOR SOLUTION INTRAMUSCULAR; INTRAVENOUS at 09:53

## 2019-02-10 NOTE — CONS
Consult Date/Type/Reason


Admit Date/Time


Feb 3, 2019 at 22:12


Initial Consult Date


2/5/19


Type of Consultation:  Pulm/CCM


Requesting Provider:  LAURENCE CASTELLANO


Date/Time of Note


DATE: 2/10/19 


TIME: 12:28





Subjective


No events. EEG reviewed[facility]





Objective


Vitals





Vital Signs


  Date      Temp  Pulse  Resp  B/P (MAP)   Pulse Ox  O2          O2 Flow    FiO2


Time                                                 Delivery    Rate


   2/10/19           75    14  96/66 (76)       100


     10:45


   2/10/19                                           Mechanical


     10:00                                           Ventilator


   2/10/19  97.5


     08:00


   2/10/19                                                                    30


     08:00








Intake and Output





2/9/19


2/9/19


2/10/19





1515:00


23:00


07:00





IntakeIntake Total


1452.813 ml


846.3730 ml


1255.004 ml





OutputOutput Total


245 ml


300 ml


470 ml





BalanceBalance


1207.813 ml


546.3730 ml


785.004 ml











Exam


HEENT: Neck supple; no JVD; no LAD; + ET tube 


CVS: RRR, S1 and S2; 


CHEST: Clear


ABD: Soft, NT, + BS


EXT: No c/c/e





Results/Medications


Result Diagram:  


2/10/19 0420                                                                    


           2/10/19 0609





Results 24 hrs





Laboratory Tests


Test
              2/10/19
04:19  2/10/19
04:20     2/10/19
05:00  2/10/19
06:09


Lactic Acid Level          1.1


White Blood Count                         9.4


Red Blood Count                         2.50  L


Hemoglobin                               8.0  L


Hematocrit                              24.2  L


Mean Corpuscular                         96.8


Volume


Mean Corpuscular                         32.0


Hemoglobin


Mean Corpuscular   
                    33.1  
  
                 



Hemoglobin
Concen


t


Red Cell                                 13.4


Distribution


Width


Platelet Count                            151


Mean Platelet                           10.5  H


Volume


Immature                               0.500  H


Granulocytes %


Neutrophils %                            73.4


Lymphocytes %                           10.3  L


Monocytes %                             11.8  H


Eosinophils %                             3.7


Basophils %                               0.3


Nucleated Red                             0.0


Blood Cells %


Immature                               0.050  H


Granulocytes #


Neutrophils #                             6.9


Lymphocytes #                             1.0


Monocytes #                              1.1  H


Eosinophils #                             0.4


Basophils #                               0.0


Nucleated Red                             0.0


Blood Cells #


Phosphorus Level                         2.3  L


Blood Gas          
              
              Blood arterial
   



Specimen Source



Arterial Blood     
              
              2/10/2019
4:45:0  



Date Drawn
                                                  1 AM


Arterial Blood pH  
              
                     7.452  H
  



(Temp
corrected)


Arterial Blood     
              
                      32.9  L
  



pCO2


(Temp
correct)


Arterial Blood     
              
                     163.8  H
  



pO2


(Temp
corrected)


Arterial Blood                                             22.5


HCO3


Arterial Blood                                             -1.0


Base Excess


Arterial Blood     
              
                      98.7  H
  



Oxygen
Saturation


Praveen Test                                       ACCEPTAB


Arterial Blood     
              
              Right Radial  
   



Gas Puncture
Site


Arterial           
              
                        0.3  
  



Blood
Carboxyhemo


globin


Arterial Blood                                              0.2


Methemoglobin


Blood Gas A-a O2                                           11.4


Differential


Oxyhemoglobin                                              98.2


Percent


Blood Gas                                                  37.0


Temperature


Blood Gas                                                  14.0


Respiration Rate


Blood Gas Actual   
              
                         14  
  



Respiration
Rate


Blood Gas                                        VENT - AC


Modality


FiO2                                                       30.0


Blood Gas Tidal                                           400.0


Volume


Blood Gas Low                                               5.0


PEEP Setting


Blood Gas                                        KAYLEY


Notified Whom


Blood Gas          
              
              2/10/2019
5:06:1  



Notified Time
                                               2 AM


Sodium Level                                                              130  L


Potassium Level                                                            4.6


Chloride Level                                                             104


Carbon Dioxide                                                              23


Level


Anion Gap                                                                   3  L


Blood Urea                                                                  10


Nitrogen


Creatinine                                                                0.47


Est Glomerular     
              
              
                 > 60  



Filtrat


Rate
mL/min


Glucose Level                                                              146


Calcium Level                                                             7.6  L


Test
              2/10/19
10:40  
              
                 



Stool Occult       NEGATIVE


Blood





Home Meds


Reported Medications


Cyanocobalamin* (Vitamin B-12*) 50 Mcg Tablet, 50 MCG PO DAILY, TAB


   2/4/19


Amlodipine Besylate* (Amlodipine Besylate*) 2.5 Mg Tablet, 5 MG PO DAILY, #30 


TAB


   2/4/19


Risperidone* (Risperidone*) 0.5 Mg Tablet, 0.5 MG PO DAILY, TAB


   2/4/19


Lamotrigine* (Lamotrigine*) 25 Mg Tablet, 25 MG PO DAILY, TAB


   2/4/19


Benztropine Mesylate* (Benztropine Mesylate*) 2 Mg Tablet, 2 MG PO BID, TAB


   2/4/19


Medications





Current Medications


IV Flush (NS 3 ml) 3 ml PER PROTOCOL IV ;  Start 2/4/19 at 03:30


Ondansetron HCl (Zofran Inj) 4 mg Q6H  PRN IV NAUSEA/VOMITING;  Start 2/4/19 at 


03:30


Acetaminophen (Tylenol Tab) 650 mg Q6H  PRN PO .PAIN 1-3 OR TEMP;  Start 2/4/19 


at 03:30


Docusate Sodium (Colace) 100 mg Q12H  PRN PO .CONSTIPATION;  Start 2/4/19 at 


03:30


Magnesium Hydroxide (Milk Of Mag) 30 ml DAILY  PRN PO .CONSTIPATION;  Start 


2/4/19 at 03:30


Pantoprazole (Protonix Iv) 40 mg DAILY@06 IV  Last administered on 2/10/19at 


05:08; Admin Dose 40 MG;  Start 2/4/19 at 06:00


Heparin Sodium (Porcine) (Heparin  (5000 Units/1ml)) 5,000 unit Q12 SC  Last 


administered on 2/10/19at 10:09; Admin Dose 5,000 UNIT;  Start 2/4/19 at 09:00


Albuterol/ Ipratropium (Duoneb) 3 ml Q4H RESP THERAPY  PRN HHN SHORTNESS OF 


BREATH;  Start 2/4/19 at 03:30


Hydralazine HCl (Apresoline) 10 mg Q6H  PRN IV ELEVATED BLOOD PRESSURE;  Start 


2/4/19 at 03:30


Nitroglycerin (Nitroglycerin (Sl Tab) 0.4 Mg) 1 tab Q5M  PRN SL ANGINA;  Start 


2/4/19 at 03:30


Lorazepam (Ativan) 1 mg Q2H  PRN IV SEIZURES Last administered on 2/4/19at 


03:57; Admin Dose 1 MG;  Start 2/4/19 at 03:30


Miscellaneous Information (Pending Santyl Order For Wound Care) This patient 


ha... PRN  PRN XX WOUND CARE;  Start 2/4/19 at 05:00


Propofol 100 ml @  1.227 mls/ hr Q12H IV ;  Start 2/5/19 at 21:00


Norepinephrine 250 ml @  1.875 mls/ hr TITRATE IV  Last administered on 2/8/19at


15:22; Admin Dose 7.5 MLS/HR;  Start 2/5/19 at 23:00


Collagenase (Santyl) 1 applic DAILY TOP  Last administered on 2/10/19at 09:52; 


Admin Dose 1 APPLIC;  Start 2/6/19 at 11:30


IV Flush (NS 10 ml) 10 ml PRN  PRN IV IV PROTOCOL;  Start 2/6/19 at 15:00


Cefepime HCl 50 ml @  100 mls/hr Q12 IVPB  Last administered on 2/10/19at 09:53;


Admin Dose 100 MLS/HR;  Start 2/8/19 at 13:00


Levetiracetam 100 ml @  400 mls/hr Q12 IVPB  Last administered on 2/10/19at 


12:16; Admin Dose 400 MLS/HR;  Start 2/9/19 at 21:00


Dextrose/Lactated Ringer's 1,000 ml @  20 mls/hr Q24H IV  Last administered on 


2/10/19at 09:54; Admin Dose 20 MLS/HR;  Start 2/10/19 at 09:00





Assessment/Plan


Assessment/Plan (Daily)


IMP:


1.  Encephalopathy--post-ictal state s/p status epilepticus.


2.  Vent Dependence 2/2 #1


3.  Metabolic acidosis likely secondary to hypoperfusion.  Now improved


4.  Septic Shock


5.  s/p lactic acidosis 


 


RECS: 


1.  Titrate levophed to MAP > 65 mm Hg


2.  Intravenous antibiotics; de-escalate pending cultures


3.  Continue antiepileptics per Neuro 


4.  Minimize sedation; continue to follow neuro exam


5.  DVT and GI prophylaxis





cc 40mins.











MARCIE WRIGHT MD              Feb 10, 2019 12:30

## 2019-02-10 NOTE — EEG
EEG NOTE


Report Details


DATE OF TEST: 2/10/19





HISTORY:


The patient is a 58-year-old F who presents with altered mental status.





This EEG is requested to rule out nonconvulsive status epilepticus.





SEDATION:


None.





CONDITIONS OF RECORDING:


This EEG was recorded digitally on the [employer] machine, using the 


International 10-20 System of electrodes plus anterior temporals and Nz.





STATES SAMPLED:


Comatose.





FINDINGS:


The background is discontinuous, though symmetric..





There is occasional theta and delta activity..





The normal anterior-to-posterior frequency-amplitude gradient was absent.





Photic stimulation does not elicit any definite driving responses or 


epileptiform discharges.





Hyperventilation was not performed.





No epileptiform discharges were seen.











IMPRESSION:


Abnormal electroencephalogram due to: severe diffuse slowing.





COMMENT:


The slowing of the background indicates severe, diffuse cortical dysfunction of 


nonspecific etiology.











JOSE FRANCOIS                 Feb 10, 2019 13:34

## 2019-02-10 NOTE — CONS
Assessment/Plan


Assessment/Plan


Hospital Course


59 yo F with hx of West Nile Virus NOS and reported chronic encephalopathy who 


p/w ams.


She was additionally reported to have a seizure... for which neurology is co


nsulted.





Most clinically consistent with new onset epilepsy.





Initial EEG was notable for GPEDs and an electrographic seizure originating in 


the R temporal region..


s/p Dilantin load, with ongoing episodic staring spells (presumed seizures) 


noted clinically..


s/p Phenobarbital load on 2/5..with resolution of episodes suspicious for 


seizure








P:


Repeat EEG today


Repeat phb level today


Continue Keppra bid for now


Limit other sedating medications where possible


Other medical management per primary


Will follow clinically





Consultation Date/Type/Reason


Admit Date/Time


Feb 3, 2019 at 22:12


Type of Consult


Neurology


Requesting Provider:  LAURENCE CASTELLANO


Date/Time of Note


DATE: 2/10/19 


TIME: 09:52





Exam


Vital Signs


Vitals





Vital Signs


  Date      Temp  Pulse  Resp  B/P (MAP)   Pulse Ox  O2          O2 Flow    FiO2


Time                                                 Delivery    Rate


   2/10/19           83


     08:00


   2/10/19                 16  94/67 (76)       100  Mechanical


     06:00                                           Ventilator


   2/10/19                                                                    30


     05:15


   2/10/19  98.2


     04:00








Intake and Output





2/9/19


2/9/19


2/10/19





1515:00


23:00


07:00





IntakeIntake Total


1452.813 ml


846.3730 ml


1133.441 ml





OutputOutput Total


245 ml


300 ml


470 ml





BalanceBalance


1207.813 ml


546.3730 ml


663.441 ml














Exam


PE:


Gen Appearance: No Apparent Distress


HEENT: Intubated


Cardiovascular: Regular rate


Abdomen: Soft


Extremities: Dry





NE:


The patient was comatose...with forced downgaze..





Cranial nerve examination was limited by mental status. Face was grossly 


symmetric, w/ present corneal and cough reflexes.





Tone was normal. Muscle bulk was reduced. I did not see fasciculations. The 


patient did not withdraw to noxious stimulation x 4.





Coordination and gait testing was limited by mental status.





Arm and leg reflexes were within normal limits and symmetric. Richmond's sign was


absent. Plantar responses were flexor.











JOSE FRANCOIS                 Feb 10, 2019 09:55

## 2019-02-10 NOTE — CONS
Assessment/Plan


Assessment/Plan


Hospital Course (Demo Recall)


No acute changes patient remains intubated on Levophed at 3 mics per minute 


afebrile.  WBC 9.4 platelets 151 neutrophils 73.4 BUN 10 creatinine 0.47





Chest x-ray revealed opacities in the left lung base





Antimicrobials: Cefepime





Microbiology: Urine culture grew Streptococcus, sputum culture growing 


pseudomonas aeruginosa





Indwelling: Endotracheal tube orogastric tube Davila catheter left upper 


extremity PICC line





Physical examination: Well-developed chronically ill-appearing middle-aged woman


who is intubated in no distress.  Head atraumatic normocephalic.  Sclera 


nonicteric.  Neck is supple.  Chest rise symmetrical, breath sounds diminished 


bases.  Heart: S1-S2.  Abdomen soft bowel sounds present, hypoactive.  


Extremities cyanotic with trace edema





Assessment:


1.  Septic shock


2.  Gram-positive cocci bacteremia cw contaminant


3.  Respiratory failure


4.  New onset seizures


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


5.  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


6.  Urinary tract infection





Plan: Remains hemodynamically unstable, continue present care antibiotics





Consultation Date/Type/Reason


Admit Date/Time


Feb 3, 2019 at 22:12


Initial Consult Date


2/5/19


Type of Consult


id


Requesting Provider:  LAURENCE CASTELLANO


Date/Time of Note


DATE: 2/10/19 


TIME: 19:05





Exam/Review of Systems


Exam


Vitals





Vital Signs


  Date      Temp  Pulse  Resp  B/P (MAP)   Pulse Ox  O2          O2 Flow    FiO2


Time                                                 Delivery    Rate


   2/10/19           90    15                   100                           30


     17:30


   2/10/19                     96/66 (76)


     10:45


   2/10/19                                           Mechanical


     10:00                                           Ventilator


   2/10/19  97.5


     08:00








Intake and Output





2/9/19


2/9/19


2/10/19





1515:00


23:00


07:00





IntakeIntake Total


1452.813 ml


846.3730 ml


1255.004 ml





OutputOutput Total


245 ml


300 ml


470 ml





BalanceBalance


1207.813 ml


546.3730 ml


785.004 ml














Results


Result Diagram:  


2/10/19 0420                                                                    


           2/10/19 0609





Results 24hrs





Laboratory Tests


Test
              2/10/19
04:19  2/10/19
04:20     2/10/19
05:00  2/10/19
06:09


Lactic Acid Level          1.1


White Blood Count                         9.4


Red Blood Count                         2.50  L


Hemoglobin                               8.0  L


Hematocrit                              24.2  L


Mean Corpuscular                         96.8


Volume


Mean Corpuscular                         32.0


Hemoglobin


Mean Corpuscular   
                    33.1  
  
                 



Hemoglobin
Concen


t


Red Cell                                 13.4


Distribution


Width


Platelet Count                            151


Mean Platelet                           10.5  H


Volume


Immature                               0.500  H


Granulocytes %


Neutrophils %                            73.4


Lymphocytes %                           10.3  L


Monocytes %                             11.8  H


Eosinophils %                             3.7


Basophils %                               0.3


Nucleated Red                             0.0


Blood Cells %


Immature                               0.050  H


Granulocytes #


Neutrophils #                             6.9


Lymphocytes #                             1.0


Monocytes #                              1.1  H


Eosinophils #                             0.4


Basophils #                               0.0


Nucleated Red                             0.0


Blood Cells #


Phosphorus Level                         2.3  L


Blood Gas          
              
              Blood arterial
   



Specimen Source



Arterial Blood     
              
              2/10/2019
4:45:0  



Date Drawn
                                                  1 AM


Arterial Blood pH  
              
                     7.452  H
  



(Temp
corrected)


Arterial Blood     
              
                      32.9  L
  



pCO2


(Temp
correct)


Arterial Blood     
              
                     163.8  H
  



pO2


(Temp
corrected)


Arterial Blood                                             22.5


HCO3


Arterial Blood                                             -1.0


Base Excess


Arterial Blood     
              
                      98.7  H
  



Oxygen
Saturation


Praveen Test                                       ACCEPTAB


Arterial Blood     
              
              Right Radial  
   



Gas Puncture
Site


Arterial           
              
                        0.3  
  



Blood
Carboxyhemo


globin


Arterial Blood                                              0.2


Methemoglobin


Blood Gas A-a O2                                           11.4


Differential


Oxyhemoglobin                                              98.2


Percent


Blood Gas                                                  37.0


Temperature


Blood Gas                                                  14.0


Respiration Rate


Blood Gas Actual   
              
                         14  
  



Respiration
Rate


Blood Gas                                        VENT - AC


Modality


FiO2                                                       30.0


Blood Gas Tidal                                           400.0


Volume


Blood Gas Low                                               5.0


PEEP Setting


Blood Gas                                        KAYLEY


Notified Whom


Blood Gas          
              
              2/10/2019
5:06:1  



Notified Time
                                               2 AM


Sodium Level                                                              130  L


Potassium Level                                                            4.6


Chloride Level                                                             104


Carbon Dioxide                                                              23


Level


Anion Gap                                                                   3  L


Blood Urea                                                                  10


Nitrogen


Creatinine                                                                0.47


Est Glomerular     
              
              
                 > 60  



Filtrat


Rate
mL/min


Glucose Level                                                              146


Calcium Level                                                             7.6  L


Test
              2/10/19
10:40  
              
                 



Stool Occult       NEGATIVE


Blood








Medications


Medication





Current Medications


IV Flush (NS 3 ml) 3 ml PER PROTOCOL IV ;  Start 2/4/19 at 03:30


Ondansetron HCl (Zofran Inj) 4 mg Q6H  PRN IV NAUSEA/VOMITING;  Start 2/4/19 at 


03:30


Acetaminophen (Tylenol Tab) 650 mg Q6H  PRN PO .PAIN 1-3 OR TEMP;  Start 2/4/19 


at 03:30


Docusate Sodium (Colace) 100 mg Q12H  PRN PO .CONSTIPATION;  Start 2/4/19 at 


03:30


Magnesium Hydroxide (Milk Of Mag) 30 ml DAILY  PRN PO .CONSTIPATION;  Start 


2/4/19 at 03:30


Pantoprazole (Protonix Iv) 40 mg DAILY@06 IV  Last administered on 2/10/19at 


05:08; Admin Dose 40 MG;  Start 2/4/19 at 06:00


Heparin Sodium (Porcine) (Heparin  (5000 Units/1ml)) 5,000 unit Q12 SC  Last 


administered on 2/10/19at 10:09; Admin Dose 5,000 UNIT;  Start 2/4/19 at 09:00


Albuterol/ Ipratropium (Duoneb) 3 ml Q4H RESP THERAPY  PRN HHN SHORTNESS OF 


BREATH;  Start 2/4/19 at 03:30


Hydralazine HCl (Apresoline) 10 mg Q6H  PRN IV ELEVATED BLOOD PRESSURE;  Start 


2/4/19 at 03:30


Nitroglycerin (Nitroglycerin (Sl Tab) 0.4 Mg) 1 tab Q5M  PRN SL ANGINA;  Start 


2/4/19 at 03:30


Lorazepam (Ativan) 1 mg Q2H  PRN IV SEIZURES Last administered on 2/4/19at 


03:57; Admin Dose 1 MG;  Start 2/4/19 at 03:30


Miscellaneous Information (Pending Santyl Order For Wound Care) This patient 


ha... PRN  PRN XX WOUND CARE;  Start 2/4/19 at 05:00


Propofol 100 ml @  1.227 mls/ hr Q12H IV ;  Start 2/5/19 at 21:00


Norepinephrine 250 ml @  1.875 mls/ hr TITRATE IV  Last administered on 


2/10/19at 16:40; Admin Dose 6.563 MLS/HR;  Start 2/5/19 at 23:00


Collagenase (Santyl) 1 applic DAILY TOP  Last administered on 2/10/19at 09:52; 


Admin Dose 1 APPLIC;  Start 2/6/19 at 11:30


IV Flush (NS 10 ml) 10 ml PRN  PRN IV IV PROTOCOL;  Start 2/6/19 at 15:00


Cefepime HCl 50 ml @  100 mls/hr Q12 IVPB  Last administered on 2/10/19at 09:53;


Admin Dose 100 MLS/HR;  Start 2/8/19 at 13:00


Levetiracetam 100 ml @  400 mls/hr Q12 IVPB  Last administered on 2/10/19at 


12:16; Admin Dose 400 MLS/HR;  Start 2/9/19 at 21:00


Dextrose/Lactated Ringer's 1,000 ml @  20 mls/hr Q24H IV  Last administered on 


2/10/19at 09:54; Admin Dose 20 MLS/HR;  Start 2/10/19 at 09:00











RUKHSANA ZEPEDA NP            Feb 10, 2019 19:06

## 2019-02-10 NOTE — PN
Date/Time of Note


Date/Time of Note


DATE: 2/10/19 


TIME: 08:40





Assessment/Plan


VTE Prophylaxis


Risk score (from Nsg)>0 risk:  6


SCD applied (from Nsg):  Yes


Pharmacological prophylaxis:  heparin





Lines/Catheters


IV Catheter Type (from Nrsg):  PICC Line


Central line still needed:  Yes


Urinary Cath still in place:  Yes


Reason Cath still needed:  skin wounds contaminated by urine





Assessment/Plan


Problems:  


(1) Seizure disorder


Status:  Acute


Comment:  Neurology is helping guide this.  Medications are being adjusted to 


see if it will help the patient have a slightly better mental status.  At the 


present time during my exam she is nonresponsive to noxious stimuli.





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


Status:  Chronic


Comment:  Noted.  This is historical and I do not have the data to prove it but 


I do believe this is accurate information.  Therefore long-term prognosis for 


recovery has real limitation





(3) Mental status alteration


Status:  Acute


Comment:  Noted.


Qualifiers:  


   Altered mental status type:  coma  Coma depth:  [place] coma 3-8  Coma 


timing:  in the field (EMT or ambulance)  Qualified Codes:  R40.2431 - [place] 


coma scale score 3-8, in the field [emt or ambulance]


(4) Dementia


Status:  Chronic


Comment:  The patient is not likely going to be able to feed herself or even be 


fed with assistance in the future.  As such we need to make determinations about


whether or not we want to put in a feeding tube.  When the family comes today I 


am going to approach the subject on that


Qualifiers:  


   Dementia type:  unspecified type  Dementia behavioral disturbance:  without 


behavioral disturbance  Qualified Codes:  F03.90 - Unspecified dementia without 


behavioral disturbance


(5) Bedbound


Status:  Chronic


Comment:  Noted.  Before doing what we can for wounds including the sacral 


decubiti and bilateral heel protection





(6) Anemia


Status:  Acute


Comment:  Check stool for occult blood





(7) Pseudomonas aeruginosa infection


Status:  Acute


Comment:  On appropriate cephalosporin antibiotic coverage





(8) Hepatitis B surface antigen positive


Status:  Chronic


Comment:  Noted.  Hepatitis precautions for the staff





(9) Hypophosphatemia


Status:  Resolved


Comment:  Resolved with replacement therapy





(10) Essential hypertension


Status:  Chronic


Comment:  Patient remains on norepinephrine drip





(11) Hyperlipidemia


Status:  Chronic


Comment:  Adequate but under the circumstances no need for aggressive 


intervention


Qualifiers:  


   Hyperlipidemia type:  pure hypercholesterolemia  Qualified Codes:  E78.00 - 


Pure hypercholesterolemia, unspecified


(12) Acute hypokalemia


Status:  Resolved


Comment:  Resolved





Result Diagram:  


2/10/19 0420                                                                    


           2/10/19 0609





Results 24hrs





Laboratory Tests


Test
               2/9/19
09:56  2/10/19
04:19  2/10/19
04:20     2/10/19
05:00


Rapid Plasma       NONREACTIVE


Reagin


Hepatitis B        POSITIVE  H


Surface Antigen


Hepatitis C        NEGATIVE


Antibody


Lactic Acid Level                         1.1


White Blood Count                                        9.4


Red Blood Count                                        2.50  L


Hemoglobin                                              8.0  L


Hematocrit                                             24.2  L


Mean Corpuscular                                        96.8


Volume


Mean Corpuscular                                        32.0


Hemoglobin


Mean Corpuscular   
              
                    33.1  
  



Hemoglobin
Concen


t


Red Cell                                                13.4


Distribution


Width


Platelet Count                                           151


Mean Platelet                                          10.5  H


Volume


Immature                                              0.500  H


Granulocytes %


Neutrophils %                                           73.4


Lymphocytes %                                          10.3  L


Monocytes %                                            11.8  H


Eosinophils %                                            3.7


Basophils %                                              0.3


Nucleated Red                                            0.0


Blood Cells %


Immature                                              0.050  H


Granulocytes #


Neutrophils #                                            6.9


Lymphocytes #                                            1.0


Monocytes #                                             1.1  H


Eosinophils #                                            0.4


Basophils #                                              0.0


Nucleated Red                                            0.0


Blood Cells #


Phosphorus Level                                        2.3  L


Blood Gas          
              
              
              Blood arterial



Specimen Source



Arterial Blood     
              
              
              2/10/2019
4:45:0


Date Drawn
                                                                 1 AM


Arterial Blood pH  
              
              
                     7.452  H



(Temp
corrected)


Arterial Blood     
              
              
                      32.9  L



pCO2


(Temp
correct)


Arterial Blood     
              
              
                     163.8  H



pO2


(Temp
corrected)


Arterial Blood                                                            22.5


HCO3


Arterial Blood                                                            -1.0


Base Excess


Arterial Blood     
              
              
                      98.7  H



Oxygen
Saturation


Praveen Test                                                      ACCEPTAB


Arterial Blood     
              
              
              Right Radial  



Gas Puncture
Site


Arterial           
              
              
                        0.3  



Blood
Carboxyhemo


globin


Arterial Blood                                                             0.2


Methemoglobin


Blood Gas A-a O2                                                          11.4


Differential


Oxyhemoglobin                                                             98.2


Percent


Blood Gas                                                                 37.0


Temperature


Blood Gas                                                                 14.0


Respiration Rate


Blood Gas Actual   
              
              
                         14  



Respiration
Rate


Blood Gas                                                       VENT - AC


Modality


FiO2                                                                      30.0


Blood Gas Tidal                                                          400.0


Volume


Blood Gas Low                                                              5.0


PEEP Setting


Blood Gas                                                       KAYLEY


Notified Whom


Blood Gas          
              
              
              2/10/2019
5:06:1


Notified Time
                                                              2 AM


Test
              2/10/19
06:09  
              
              



Sodium Level              130  L


Potassium Level            4.6


Chloride Level             104


Carbon Dioxide              23


Level


Anion Gap                   3  L


Blood Urea                  10


Nitrogen


Creatinine                0.47


Est Glomerular     > 60  
        
              
              



Filtrat


Rate
mL/min


Glucose Level              146


Calcium Level             7.6  L








Subjective


24 Hr Interval Summary


Free Text/Dictation


Patient nonresponsive to sternal rub at this time


Subjective hx not possible:  pt non-verbal, pt critical status





Exam/Review of Systems


Exam


Vitals





Vital Signs


  Date      Temp  Pulse  Resp  B/P (MAP)   Pulse Ox  O2          O2 Flow    FiO2


Time                                                 Delivery    Rate


   2/10/19           83    16  94/67 (76)       100  Mechanical


     06:00                                           Ventilator


   2/10/19                                                                    30


     05:15


   2/10/19  98.2


     04:00








Intake and Output





2/9/19


2/9/19


2/10/19





1515:00


23:00


07:00





IntakeIntake Total


1452.813 ml


846.3730 ml


1133.441 ml





OutputOutput Total


245 ml


300 ml


470 ml





BalanceBalance


1207.813 ml


546.3730 ml


663.441 ml











Constitutional:  alert, oriented


ENMT:  intubated, other (Nasogastric tube in place for feeding)


Respiratory:  clear to auscultation, normal air movement


Cardiovascular:  regular rate and rhythm, nl pulses


Gastrointestinal:  soft, nl liver, spleen, non-tender





Results


Results 24hrs





Laboratory Tests


Test
               2/9/19
09:56  2/10/19
04:19  2/10/19
04:20     2/10/19
05:00


Rapid Plasma       NONREACTIVE


Reagin


Hepatitis B        POSITIVE  H


Surface Antigen


Hepatitis C        NEGATIVE


Antibody


Lactic Acid Level                         1.1


White Blood Count                                        9.4


Red Blood Count                                        2.50  L


Hemoglobin                                              8.0  L


Hematocrit                                             24.2  L


Mean Corpuscular                                        96.8


Volume


Mean Corpuscular                                        32.0


Hemoglobin


Mean Corpuscular   
              
                    33.1  
  



Hemoglobin
Concen


t


Red Cell                                                13.4


Distribution


Width


Platelet Count                                           151


Mean Platelet                                          10.5  H


Volume


Immature                                              0.500  H


Granulocytes %


Neutrophils %                                           73.4


Lymphocytes %                                          10.3  L


Monocytes %                                            11.8  H


Eosinophils %                                            3.7


Basophils %                                              0.3


Nucleated Red                                            0.0


Blood Cells %


Immature                                              0.050  H


Granulocytes #


Neutrophils #                                            6.9


Lymphocytes #                                            1.0


Monocytes #                                             1.1  H


Eosinophils #                                            0.4


Basophils #                                              0.0


Nucleated Red                                            0.0


Blood Cells #


Phosphorus Level                                        2.3  L


Blood Gas          
              
              
              Blood arterial



Specimen Source



Arterial Blood     
              
              
              2/10/2019
4:45:0


Date Drawn
                                                                 1 AM


Arterial Blood pH  
              
              
                     7.452  H



(Temp
corrected)


Arterial Blood     
              
              
                      32.9  L



pCO2


(Temp
correct)


Arterial Blood     
              
              
                     163.8  H



pO2


(Temp
corrected)


Arterial Blood                                                            22.5


HCO3


Arterial Blood                                                            -1.0


Base Excess


Arterial Blood     
              
              
                      98.7  H



Oxygen
Saturation


Praveen Test                                                      ACCEPTAB


Arterial Blood     
              
              
              Right Radial  



Gas Puncture
Site


Arterial           
              
              
                        0.3  



Blood
Carboxyhemo


globin


Arterial Blood                                                             0.2


Methemoglobin


Blood Gas A-a O2                                                          11.4


Differential


Oxyhemoglobin                                                             98.2


Percent


Blood Gas                                                                 37.0


Temperature


Blood Gas                                                                 14.0


Respiration Rate


Blood Gas Actual   
              
              
                         14  



Respiration
Rate


Blood Gas                                                       VENT - AC


Modality


FiO2                                                                      30.0


Blood Gas Tidal                                                          400.0


Volume


Blood Gas Low                                                              5.0


PEEP Setting


Blood Gas                                                       KAYLEY


Notified Whom


Blood Gas          
              
              
              2/10/2019
5:06:1


Notified Time
                                                              2 AM


Test
              2/10/19
06:09  
              
              



Sodium Level              130  L


Potassium Level            4.6


Chloride Level             104


Carbon Dioxide              23


Level


Anion Gap                   3  L


Blood Urea                  10


Nitrogen


Creatinine                0.47


Est Glomerular     > 60  
        
              
              



Filtrat


Rate
mL/min


Glucose Level              146


Calcium Level             7.6  L








Medications


Medication





Current Medications


IV Flush (NS 3 ml) 3 ml PER PROTOCOL IV ;  Start 2/4/19 at 03:30


Ondansetron HCl (Zofran Inj) 4 mg Q6H  PRN IV NAUSEA/VOMITING;  Start 2/4/19 at 


03:30


Acetaminophen (Tylenol Tab) 650 mg Q6H  PRN PO .PAIN 1-3 OR TEMP;  Start 2/4/19 


at 03:30


Docusate Sodium (Colace) 100 mg Q12H  PRN PO .CONSTIPATION;  Start 2/4/19 at 


03:30


Magnesium Hydroxide (Milk Of Mag) 30 ml DAILY  PRN PO .CONSTIPATION;  Start 


2/4/19 at 03:30


Pantoprazole (Protonix Iv) 40 mg DAILY@06 IV  Last administered on 2/10/19at 


05:08; Admin Dose 40 MG;  Start 2/4/19 at 06:00


Heparin Sodium (Porcine) (Heparin  (5000 Units/1ml)) 5,000 unit Q12 SC  Last 


administered on 2/9/19at 20:18; Admin Dose 5,000 UNIT;  Start 2/4/19 at 09:00


Albuterol/ Ipratropium (Duoneb) 3 ml Q4H RESP THERAPY  PRN HHN SHORTNESS OF 


BREATH;  Start 2/4/19 at 03:30


Hydralazine HCl (Apresoline) 10 mg Q6H  PRN IV ELEVATED BLOOD PRESSURE;  Start 


2/4/19 at 03:30


Nitroglycerin (Nitroglycerin (Sl Tab) 0.4 Mg) 1 tab Q5M  PRN SL ANGINA;  Start 


2/4/19 at 03:30


Lorazepam (Ativan) 1 mg Q2H  PRN IV SEIZURES Last administered on 2/4/19at 


03:57; Admin Dose 1 MG;  Start 2/4/19 at 03:30


Miscellaneous Information (Pending Santyl Order For Wound Care) This patient 


ha... PRN  PRN XX WOUND CARE;  Start 2/4/19 at 05:00


Propofol 100 ml @  1.227 mls/ hr Q12H IV ;  Start 2/5/19 at 21:00


Norepinephrine 250 ml @  1.875 mls/ hr TITRATE IV  Last administered on 2/8/19at


15:22; Admin Dose 7.5 MLS/HR;  Start 2/5/19 at 23:00


Collagenase (Santyl) 1 applic DAILY TOP  Last administered on 2/9/19at 10:22; 


Admin Dose 1 APPLIC;  Start 2/6/19 at 11:30


IV Flush (NS 10 ml) 10 ml PRN  PRN IV IV PROTOCOL;  Start 2/6/19 at 15:00


Cefepime HCl 50 ml @  100 mls/hr Q12 IVPB  Last administered on 2/9/19at 20:16; 


Admin Dose 100 MLS/HR;  Start 2/8/19 at 13:00


Levetiracetam 100 ml @  400 mls/hr Q12 IVPB  Last administered on 2/9/19at 


20:13; Admin Dose 400 MLS/HR;  Start 2/9/19 at 21:00


Dextrose/Lactated Ringer's 1,000 ml @  75 mls/hr [id number] IV ;  Start 2/10/19 at 


09:00;  Status EULA GOLDMAN MD              Feb 10, 2019 08:45

## 2019-02-11 VITALS — HEART RATE: 75 BPM | RESPIRATION RATE: 17 BRPM | DIASTOLIC BLOOD PRESSURE: 72 MMHG | SYSTOLIC BLOOD PRESSURE: 97 MMHG

## 2019-02-11 VITALS — RESPIRATION RATE: 14 BRPM | SYSTOLIC BLOOD PRESSURE: 94 MMHG | DIASTOLIC BLOOD PRESSURE: 66 MMHG | HEART RATE: 71 BPM

## 2019-02-11 VITALS — RESPIRATION RATE: 14 BRPM | SYSTOLIC BLOOD PRESSURE: 125 MMHG | DIASTOLIC BLOOD PRESSURE: 84 MMHG | HEART RATE: 88 BPM

## 2019-02-11 VITALS — SYSTOLIC BLOOD PRESSURE: 111 MMHG | DIASTOLIC BLOOD PRESSURE: 77 MMHG | RESPIRATION RATE: 16 BRPM | HEART RATE: 76 BPM

## 2019-02-11 VITALS — HEART RATE: 74 BPM | DIASTOLIC BLOOD PRESSURE: 69 MMHG | RESPIRATION RATE: 15 BRPM | SYSTOLIC BLOOD PRESSURE: 109 MMHG

## 2019-02-11 VITALS — HEART RATE: 74 BPM | DIASTOLIC BLOOD PRESSURE: 68 MMHG | RESPIRATION RATE: 14 BRPM | SYSTOLIC BLOOD PRESSURE: 101 MMHG

## 2019-02-11 VITALS — DIASTOLIC BLOOD PRESSURE: 68 MMHG | RESPIRATION RATE: 14 BRPM | HEART RATE: 80 BPM | SYSTOLIC BLOOD PRESSURE: 99 MMHG

## 2019-02-11 VITALS — SYSTOLIC BLOOD PRESSURE: 106 MMHG | RESPIRATION RATE: 14 BRPM | HEART RATE: 72 BPM | DIASTOLIC BLOOD PRESSURE: 72 MMHG

## 2019-02-11 VITALS — DIASTOLIC BLOOD PRESSURE: 68 MMHG | RESPIRATION RATE: 14 BRPM | SYSTOLIC BLOOD PRESSURE: 107 MMHG | HEART RATE: 69 BPM

## 2019-02-11 VITALS — RESPIRATION RATE: 15 BRPM | DIASTOLIC BLOOD PRESSURE: 64 MMHG | SYSTOLIC BLOOD PRESSURE: 92 MMHG | HEART RATE: 74 BPM

## 2019-02-11 VITALS — HEART RATE: 77 BPM | DIASTOLIC BLOOD PRESSURE: 76 MMHG | RESPIRATION RATE: 16 BRPM | SYSTOLIC BLOOD PRESSURE: 111 MMHG

## 2019-02-11 VITALS — RESPIRATION RATE: 14 BRPM | HEART RATE: 72 BPM | SYSTOLIC BLOOD PRESSURE: 96 MMHG | DIASTOLIC BLOOD PRESSURE: 66 MMHG

## 2019-02-11 VITALS — DIASTOLIC BLOOD PRESSURE: 79 MMHG | RESPIRATION RATE: 14 BRPM | HEART RATE: 79 BPM | SYSTOLIC BLOOD PRESSURE: 114 MMHG

## 2019-02-11 VITALS — DIASTOLIC BLOOD PRESSURE: 67 MMHG | HEART RATE: 68 BPM | RESPIRATION RATE: 21 BRPM | SYSTOLIC BLOOD PRESSURE: 99 MMHG

## 2019-02-11 VITALS — RESPIRATION RATE: 20 BRPM | DIASTOLIC BLOOD PRESSURE: 76 MMHG | HEART RATE: 73 BPM | SYSTOLIC BLOOD PRESSURE: 108 MMHG

## 2019-02-11 VITALS — DIASTOLIC BLOOD PRESSURE: 77 MMHG | SYSTOLIC BLOOD PRESSURE: 118 MMHG | RESPIRATION RATE: 14 BRPM | HEART RATE: 76 BPM

## 2019-02-11 VITALS — SYSTOLIC BLOOD PRESSURE: 102 MMHG | HEART RATE: 77 BPM | RESPIRATION RATE: 14 BRPM | DIASTOLIC BLOOD PRESSURE: 70 MMHG

## 2019-02-11 VITALS — HEART RATE: 71 BPM | SYSTOLIC BLOOD PRESSURE: 103 MMHG | DIASTOLIC BLOOD PRESSURE: 70 MMHG | RESPIRATION RATE: 14 BRPM

## 2019-02-11 VITALS — RESPIRATION RATE: 14 BRPM | DIASTOLIC BLOOD PRESSURE: 65 MMHG | SYSTOLIC BLOOD PRESSURE: 107 MMHG | HEART RATE: 72 BPM

## 2019-02-11 VITALS — HEART RATE: 71 BPM | RESPIRATION RATE: 14 BRPM | SYSTOLIC BLOOD PRESSURE: 107 MMHG | DIASTOLIC BLOOD PRESSURE: 72 MMHG

## 2019-02-11 VITALS — RESPIRATION RATE: 13 BRPM | SYSTOLIC BLOOD PRESSURE: 106 MMHG | HEART RATE: 67 BPM | DIASTOLIC BLOOD PRESSURE: 64 MMHG

## 2019-02-11 VITALS — HEART RATE: 71 BPM | DIASTOLIC BLOOD PRESSURE: 59 MMHG | SYSTOLIC BLOOD PRESSURE: 86 MMHG | RESPIRATION RATE: 14 BRPM

## 2019-02-11 VITALS — DIASTOLIC BLOOD PRESSURE: 67 MMHG | HEART RATE: 73 BPM | RESPIRATION RATE: 19 BRPM | SYSTOLIC BLOOD PRESSURE: 100 MMHG

## 2019-02-11 VITALS — SYSTOLIC BLOOD PRESSURE: 107 MMHG | RESPIRATION RATE: 7 BRPM | DIASTOLIC BLOOD PRESSURE: 71 MMHG | HEART RATE: 73 BPM

## 2019-02-11 VITALS — SYSTOLIC BLOOD PRESSURE: 104 MMHG | DIASTOLIC BLOOD PRESSURE: 66 MMHG | HEART RATE: 70 BPM | RESPIRATION RATE: 12 BRPM

## 2019-02-11 VITALS — RESPIRATION RATE: 14 BRPM | DIASTOLIC BLOOD PRESSURE: 79 MMHG | HEART RATE: 79 BPM | SYSTOLIC BLOOD PRESSURE: 115 MMHG

## 2019-02-11 VITALS — SYSTOLIC BLOOD PRESSURE: 124 MMHG | DIASTOLIC BLOOD PRESSURE: 82 MMHG | HEART RATE: 74 BPM

## 2019-02-11 VITALS — SYSTOLIC BLOOD PRESSURE: 103 MMHG | RESPIRATION RATE: 14 BRPM | HEART RATE: 72 BPM | DIASTOLIC BLOOD PRESSURE: 70 MMHG

## 2019-02-11 VITALS — SYSTOLIC BLOOD PRESSURE: 99 MMHG | DIASTOLIC BLOOD PRESSURE: 68 MMHG | HEART RATE: 71 BPM | RESPIRATION RATE: 15 BRPM

## 2019-02-11 VITALS — DIASTOLIC BLOOD PRESSURE: 68 MMHG | HEART RATE: 71 BPM | SYSTOLIC BLOOD PRESSURE: 93 MMHG | RESPIRATION RATE: 14 BRPM

## 2019-02-11 VITALS — HEART RATE: 74 BPM | RESPIRATION RATE: 14 BRPM | SYSTOLIC BLOOD PRESSURE: 114 MMHG | DIASTOLIC BLOOD PRESSURE: 77 MMHG

## 2019-02-11 VITALS — RESPIRATION RATE: 14 BRPM | HEART RATE: 69 BPM | SYSTOLIC BLOOD PRESSURE: 99 MMHG | DIASTOLIC BLOOD PRESSURE: 70 MMHG

## 2019-02-11 VITALS — SYSTOLIC BLOOD PRESSURE: 103 MMHG | RESPIRATION RATE: 14 BRPM | HEART RATE: 69 BPM | DIASTOLIC BLOOD PRESSURE: 73 MMHG

## 2019-02-11 VITALS — RESPIRATION RATE: 14 BRPM | SYSTOLIC BLOOD PRESSURE: 89 MMHG | HEART RATE: 71 BPM | DIASTOLIC BLOOD PRESSURE: 65 MMHG

## 2019-02-11 VITALS — HEART RATE: 73 BPM | RESPIRATION RATE: 14 BRPM | SYSTOLIC BLOOD PRESSURE: 108 MMHG | DIASTOLIC BLOOD PRESSURE: 73 MMHG

## 2019-02-11 VITALS — DIASTOLIC BLOOD PRESSURE: 74 MMHG | SYSTOLIC BLOOD PRESSURE: 109 MMHG | RESPIRATION RATE: 14 BRPM | HEART RATE: 74 BPM

## 2019-02-11 VITALS — RESPIRATION RATE: 14 BRPM | HEART RATE: 75 BPM | DIASTOLIC BLOOD PRESSURE: 74 MMHG | SYSTOLIC BLOOD PRESSURE: 112 MMHG

## 2019-02-11 VITALS — RESPIRATION RATE: 15 BRPM | HEART RATE: 71 BPM | DIASTOLIC BLOOD PRESSURE: 69 MMHG | SYSTOLIC BLOOD PRESSURE: 106 MMHG

## 2019-02-11 VITALS — RESPIRATION RATE: 14 BRPM | HEART RATE: 71 BPM | SYSTOLIC BLOOD PRESSURE: 107 MMHG | DIASTOLIC BLOOD PRESSURE: 72 MMHG

## 2019-02-11 VITALS — SYSTOLIC BLOOD PRESSURE: 106 MMHG | DIASTOLIC BLOOD PRESSURE: 70 MMHG | RESPIRATION RATE: 14 BRPM | HEART RATE: 71 BPM

## 2019-02-11 VITALS — HEART RATE: 70 BPM | SYSTOLIC BLOOD PRESSURE: 103 MMHG | DIASTOLIC BLOOD PRESSURE: 70 MMHG | RESPIRATION RATE: 16 BRPM

## 2019-02-11 VITALS — HEART RATE: 76 BPM | RESPIRATION RATE: 14 BRPM | DIASTOLIC BLOOD PRESSURE: 68 MMHG | SYSTOLIC BLOOD PRESSURE: 105 MMHG

## 2019-02-11 VITALS — HEART RATE: 75 BPM | DIASTOLIC BLOOD PRESSURE: 80 MMHG | SYSTOLIC BLOOD PRESSURE: 118 MMHG | RESPIRATION RATE: 14 BRPM

## 2019-02-11 VITALS — HEART RATE: 75 BPM | DIASTOLIC BLOOD PRESSURE: 76 MMHG | SYSTOLIC BLOOD PRESSURE: 114 MMHG | RESPIRATION RATE: 17 BRPM

## 2019-02-11 VITALS — RESPIRATION RATE: 16 BRPM | SYSTOLIC BLOOD PRESSURE: 111 MMHG | HEART RATE: 73 BPM | DIASTOLIC BLOOD PRESSURE: 76 MMHG

## 2019-02-11 VITALS — RESPIRATION RATE: 14 BRPM | DIASTOLIC BLOOD PRESSURE: 64 MMHG | SYSTOLIC BLOOD PRESSURE: 91 MMHG | HEART RATE: 76 BPM

## 2019-02-11 VITALS — HEART RATE: 75 BPM | RESPIRATION RATE: 13 BRPM | DIASTOLIC BLOOD PRESSURE: 72 MMHG | SYSTOLIC BLOOD PRESSURE: 107 MMHG

## 2019-02-11 VITALS — DIASTOLIC BLOOD PRESSURE: 68 MMHG | RESPIRATION RATE: 14 BRPM | SYSTOLIC BLOOD PRESSURE: 90 MMHG | HEART RATE: 73 BPM

## 2019-02-11 VITALS — RESPIRATION RATE: 14 BRPM | SYSTOLIC BLOOD PRESSURE: 104 MMHG | HEART RATE: 77 BPM | DIASTOLIC BLOOD PRESSURE: 71 MMHG

## 2019-02-11 VITALS — SYSTOLIC BLOOD PRESSURE: 98 MMHG | HEART RATE: 72 BPM | RESPIRATION RATE: 14 BRPM | DIASTOLIC BLOOD PRESSURE: 68 MMHG

## 2019-02-11 VITALS — DIASTOLIC BLOOD PRESSURE: 75 MMHG | RESPIRATION RATE: 17 BRPM | HEART RATE: 75 BPM | SYSTOLIC BLOOD PRESSURE: 110 MMHG

## 2019-02-11 VITALS — HEART RATE: 68 BPM | DIASTOLIC BLOOD PRESSURE: 68 MMHG | SYSTOLIC BLOOD PRESSURE: 103 MMHG | RESPIRATION RATE: 19 BRPM

## 2019-02-11 VITALS — HEART RATE: 73 BPM | DIASTOLIC BLOOD PRESSURE: 78 MMHG | RESPIRATION RATE: 15 BRPM | SYSTOLIC BLOOD PRESSURE: 113 MMHG

## 2019-02-11 VITALS — DIASTOLIC BLOOD PRESSURE: 73 MMHG | RESPIRATION RATE: 16 BRPM | SYSTOLIC BLOOD PRESSURE: 105 MMHG | HEART RATE: 71 BPM

## 2019-02-11 VITALS — RESPIRATION RATE: 14 BRPM | SYSTOLIC BLOOD PRESSURE: 95 MMHG | HEART RATE: 71 BPM | DIASTOLIC BLOOD PRESSURE: 67 MMHG

## 2019-02-11 VITALS — HEART RATE: 71 BPM | RESPIRATION RATE: 14 BRPM | SYSTOLIC BLOOD PRESSURE: 98 MMHG | DIASTOLIC BLOOD PRESSURE: 61 MMHG

## 2019-02-11 VITALS — HEART RATE: 74 BPM | DIASTOLIC BLOOD PRESSURE: 73 MMHG | RESPIRATION RATE: 16 BRPM | SYSTOLIC BLOOD PRESSURE: 115 MMHG

## 2019-02-11 VITALS — SYSTOLIC BLOOD PRESSURE: 114 MMHG | RESPIRATION RATE: 14 BRPM | HEART RATE: 73 BPM | DIASTOLIC BLOOD PRESSURE: 75 MMHG

## 2019-02-11 VITALS — HEART RATE: 75 BPM | SYSTOLIC BLOOD PRESSURE: 95 MMHG | RESPIRATION RATE: 13 BRPM | DIASTOLIC BLOOD PRESSURE: 69 MMHG

## 2019-02-11 VITALS — SYSTOLIC BLOOD PRESSURE: 105 MMHG | RESPIRATION RATE: 14 BRPM | DIASTOLIC BLOOD PRESSURE: 68 MMHG | HEART RATE: 70 BPM

## 2019-02-11 VITALS — SYSTOLIC BLOOD PRESSURE: 92 MMHG | RESPIRATION RATE: 14 BRPM | HEART RATE: 71 BPM | DIASTOLIC BLOOD PRESSURE: 64 MMHG

## 2019-02-11 VITALS — DIASTOLIC BLOOD PRESSURE: 81 MMHG | SYSTOLIC BLOOD PRESSURE: 104 MMHG | HEART RATE: 89 BPM | RESPIRATION RATE: 16 BRPM

## 2019-02-11 VITALS — SYSTOLIC BLOOD PRESSURE: 112 MMHG | HEART RATE: 75 BPM | DIASTOLIC BLOOD PRESSURE: 73 MMHG | RESPIRATION RATE: 16 BRPM

## 2019-02-11 VITALS — DIASTOLIC BLOOD PRESSURE: 79 MMHG | SYSTOLIC BLOOD PRESSURE: 116 MMHG | HEART RATE: 77 BPM | RESPIRATION RATE: 14 BRPM

## 2019-02-11 VITALS — RESPIRATION RATE: 18 BRPM | SYSTOLIC BLOOD PRESSURE: 103 MMHG | DIASTOLIC BLOOD PRESSURE: 70 MMHG | HEART RATE: 71 BPM

## 2019-02-11 VITALS — DIASTOLIC BLOOD PRESSURE: 67 MMHG | RESPIRATION RATE: 14 BRPM | HEART RATE: 72 BPM | SYSTOLIC BLOOD PRESSURE: 94 MMHG

## 2019-02-11 VITALS — RESPIRATION RATE: 14 BRPM | DIASTOLIC BLOOD PRESSURE: 76 MMHG | HEART RATE: 77 BPM | SYSTOLIC BLOOD PRESSURE: 111 MMHG

## 2019-02-11 VITALS — DIASTOLIC BLOOD PRESSURE: 69 MMHG | RESPIRATION RATE: 17 BRPM | SYSTOLIC BLOOD PRESSURE: 100 MMHG | HEART RATE: 71 BPM

## 2019-02-11 VITALS — HEART RATE: 71 BPM | RESPIRATION RATE: 14 BRPM | SYSTOLIC BLOOD PRESSURE: 91 MMHG | DIASTOLIC BLOOD PRESSURE: 65 MMHG

## 2019-02-11 VITALS — DIASTOLIC BLOOD PRESSURE: 69 MMHG | HEART RATE: 68 BPM | RESPIRATION RATE: 19 BRPM | SYSTOLIC BLOOD PRESSURE: 105 MMHG

## 2019-02-11 VITALS — RESPIRATION RATE: 14 BRPM

## 2019-02-11 VITALS — HEART RATE: 71 BPM | DIASTOLIC BLOOD PRESSURE: 77 MMHG | RESPIRATION RATE: 14 BRPM | SYSTOLIC BLOOD PRESSURE: 110 MMHG

## 2019-02-11 VITALS — SYSTOLIC BLOOD PRESSURE: 95 MMHG | RESPIRATION RATE: 14 BRPM | DIASTOLIC BLOOD PRESSURE: 64 MMHG | HEART RATE: 68 BPM

## 2019-02-11 VITALS — RESPIRATION RATE: 15 BRPM | SYSTOLIC BLOOD PRESSURE: 108 MMHG | HEART RATE: 81 BPM | DIASTOLIC BLOOD PRESSURE: 82 MMHG

## 2019-02-11 VITALS — RESPIRATION RATE: 16 BRPM | SYSTOLIC BLOOD PRESSURE: 103 MMHG | DIASTOLIC BLOOD PRESSURE: 69 MMHG | HEART RATE: 69 BPM

## 2019-02-11 VITALS — SYSTOLIC BLOOD PRESSURE: 104 MMHG | RESPIRATION RATE: 14 BRPM | DIASTOLIC BLOOD PRESSURE: 74 MMHG | HEART RATE: 75 BPM

## 2019-02-11 VITALS — SYSTOLIC BLOOD PRESSURE: 92 MMHG | RESPIRATION RATE: 15 BRPM | DIASTOLIC BLOOD PRESSURE: 67 MMHG | HEART RATE: 72 BPM

## 2019-02-11 VITALS — HEART RATE: 73 BPM | DIASTOLIC BLOOD PRESSURE: 77 MMHG | RESPIRATION RATE: 14 BRPM | SYSTOLIC BLOOD PRESSURE: 109 MMHG

## 2019-02-11 VITALS — DIASTOLIC BLOOD PRESSURE: 67 MMHG | RESPIRATION RATE: 14 BRPM | SYSTOLIC BLOOD PRESSURE: 99 MMHG | HEART RATE: 69 BPM

## 2019-02-11 VITALS — DIASTOLIC BLOOD PRESSURE: 69 MMHG | HEART RATE: 70 BPM | RESPIRATION RATE: 18 BRPM | SYSTOLIC BLOOD PRESSURE: 99 MMHG

## 2019-02-11 VITALS — RESPIRATION RATE: 14 BRPM | SYSTOLIC BLOOD PRESSURE: 99 MMHG | HEART RATE: 72 BPM | DIASTOLIC BLOOD PRESSURE: 70 MMHG

## 2019-02-11 VITALS — DIASTOLIC BLOOD PRESSURE: 62 MMHG | HEART RATE: 73 BPM | RESPIRATION RATE: 14 BRPM | SYSTOLIC BLOOD PRESSURE: 89 MMHG

## 2019-02-11 VITALS — DIASTOLIC BLOOD PRESSURE: 71 MMHG | HEART RATE: 75 BPM | RESPIRATION RATE: 17 BRPM | SYSTOLIC BLOOD PRESSURE: 106 MMHG

## 2019-02-11 VITALS — DIASTOLIC BLOOD PRESSURE: 73 MMHG | HEART RATE: 77 BPM | RESPIRATION RATE: 15 BRPM | SYSTOLIC BLOOD PRESSURE: 111 MMHG

## 2019-02-11 VITALS — SYSTOLIC BLOOD PRESSURE: 80 MMHG | HEART RATE: 71 BPM | RESPIRATION RATE: 13 BRPM | DIASTOLIC BLOOD PRESSURE: 54 MMHG

## 2019-02-11 VITALS — HEART RATE: 71 BPM | DIASTOLIC BLOOD PRESSURE: 67 MMHG | SYSTOLIC BLOOD PRESSURE: 101 MMHG | RESPIRATION RATE: 14 BRPM

## 2019-02-11 VITALS — RESPIRATION RATE: 14 BRPM | HEART RATE: 75 BPM | SYSTOLIC BLOOD PRESSURE: 113 MMHG | DIASTOLIC BLOOD PRESSURE: 74 MMHG

## 2019-02-11 VITALS — HEART RATE: 70 BPM | RESPIRATION RATE: 14 BRPM | DIASTOLIC BLOOD PRESSURE: 70 MMHG | SYSTOLIC BLOOD PRESSURE: 100 MMHG

## 2019-02-11 VITALS — DIASTOLIC BLOOD PRESSURE: 74 MMHG | HEART RATE: 73 BPM | SYSTOLIC BLOOD PRESSURE: 107 MMHG | RESPIRATION RATE: 16 BRPM

## 2019-02-11 VITALS — DIASTOLIC BLOOD PRESSURE: 70 MMHG | HEART RATE: 70 BPM | SYSTOLIC BLOOD PRESSURE: 103 MMHG | RESPIRATION RATE: 16 BRPM

## 2019-02-11 VITALS — HEART RATE: 72 BPM | RESPIRATION RATE: 8 BRPM | DIASTOLIC BLOOD PRESSURE: 71 MMHG | SYSTOLIC BLOOD PRESSURE: 100 MMHG

## 2019-02-11 VITALS — SYSTOLIC BLOOD PRESSURE: 94 MMHG | DIASTOLIC BLOOD PRESSURE: 69 MMHG | RESPIRATION RATE: 16 BRPM | HEART RATE: 72 BPM

## 2019-02-11 VITALS — RESPIRATION RATE: 14 BRPM | SYSTOLIC BLOOD PRESSURE: 111 MMHG | DIASTOLIC BLOOD PRESSURE: 80 MMHG | HEART RATE: 73 BPM

## 2019-02-11 VITALS — HEART RATE: 70 BPM | DIASTOLIC BLOOD PRESSURE: 72 MMHG | SYSTOLIC BLOOD PRESSURE: 107 MMHG | RESPIRATION RATE: 14 BRPM

## 2019-02-11 VITALS — DIASTOLIC BLOOD PRESSURE: 76 MMHG | SYSTOLIC BLOOD PRESSURE: 115 MMHG | HEART RATE: 72 BPM | RESPIRATION RATE: 14 BRPM

## 2019-02-11 VITALS — SYSTOLIC BLOOD PRESSURE: 98 MMHG | DIASTOLIC BLOOD PRESSURE: 63 MMHG | RESPIRATION RATE: 14 BRPM | HEART RATE: 69 BPM

## 2019-02-11 VITALS — DIASTOLIC BLOOD PRESSURE: 64 MMHG | RESPIRATION RATE: 14 BRPM | HEART RATE: 73 BPM | SYSTOLIC BLOOD PRESSURE: 94 MMHG

## 2019-02-11 VITALS — RESPIRATION RATE: 15 BRPM

## 2019-02-11 LAB
ABNORMAL IP MESSAGE: 1
ADD MAN DIFF?: NO
ANION GAP: 1 (ref 5–13)
BASOPHIL #: 0.1 10^3/UL (ref 0–0.1)
BASOPHILS %: 0.5 % (ref 0–2)
BLOOD UREA NITROGEN: 11 MG/DL (ref 7–20)
CALCIUM: 7.7 MG/DL (ref 8.4–10.2)
CARBON DIOXIDE: 29 MMOL/L (ref 21–31)
CHLORIDE: 105 MMOL/L (ref 97–110)
CREATININE: 0.48 MG/DL (ref 0.44–1)
EOSINOPHILS #: 0.4 10^3/UL (ref 0–0.5)
EOSINOPHILS %: 3.6 % (ref 0–7)
GLUCOSE: 138 MG/DL (ref 70–220)
HEMATOCRIT: 26.4 % (ref 37–47)
HEMOGLOBIN: 8.9 G/DL (ref 12–16)
IMMATURE GRANS #M: 0.07 10^3/UL (ref 0–0.03)
IMMATURE GRANS % (M): 0.7 % (ref 0–0.43)
LACTIC ACID: 1.4 MMOL/L (ref 0.5–2)
LYMPHOCYTES #: 1.5 10^3/UL (ref 0.8–2.9)
LYMPHOCYTES %: 14.2 % (ref 15–51)
MEAN CORPUSCULAR HEMOGLOBIN: 31.2 PG (ref 29–33)
MEAN CORPUSCULAR HGB CONC: 33.7 G/DL (ref 32–37)
MEAN CORPUSCULAR VOLUME: 92.6 FL (ref 82–101)
MEAN PLATELET VOLUME: 11.2 FL (ref 7.4–10.4)
MONOCYTE #: 1.5 10^3/UL (ref 0.3–0.9)
MONOCYTES %: 14.9 % (ref 0–11)
NEUTROPHIL #: 6.8 10^3/UL (ref 1.6–7.5)
NEUTROPHILS %: 66.1 % (ref 39–77)
NUCLEATED RED BLOOD CELLS #: 0 10^3/UL (ref 0–0)
NUCLEATED RED BLOOD CELLS%: 0 /100WBC (ref 0–0)
PHOSPHORUS: 3.6 MG/DL (ref 2.5–4.9)
PLATELET COUNT: 108 10^3/UL (ref 140–415)
POSITIVE DIFF: (no result)
POTASSIUM: 3.7 MMOL/L (ref 3.5–5.1)
RED BLOOD COUNT: 2.85 10^6/UL (ref 4.2–5.4)
RED CELL DISTRIBUTION WIDTH: 13.2 % (ref 11.5–14.5)
SODIUM: 135 MMOL/L (ref 135–144)
VZV IGM SER IA-ACNC: 0.26
VZV IGM SER IA-ACNC: 0.26
WHITE BLOOD COUNT: 10.3 10^3/UL (ref 4.8–10.8)

## 2019-02-11 RX ADMIN — PANTOPRAZOLE SODIUM SCH MG: 40 INJECTION, POWDER, FOR SOLUTION INTRAVENOUS at 05:43

## 2019-02-11 RX ADMIN — LEVETIRACETAM SCH MLS/HR: 5 INJECTION INTRAVENOUS at 08:18

## 2019-02-11 RX ADMIN — LEVETIRACETAM SCH MLS/HR: 100 INJECTION, SOLUTION INTRAVENOUS at 20:38

## 2019-02-11 RX ADMIN — CEFEPIME 1 MLS/HR: 1 INJECTION, POWDER, FOR SOLUTION INTRAMUSCULAR; INTRAVENOUS at 20:38

## 2019-02-11 RX ADMIN — PANTOPRAZOLE SODIUM 1 MG: 40 INJECTION, POWDER, FOR SOLUTION INTRAVENOUS at 05:43

## 2019-02-11 RX ADMIN — CEFEPIME SCH MLS/HR: 1 INJECTION, POWDER, FOR SOLUTION INTRAMUSCULAR; INTRAVENOUS at 20:38

## 2019-02-11 RX ADMIN — PROPOFOL SCH MLS/HR: 10 INJECTION, EMULSION INTRAVENOUS at 09:00

## 2019-02-11 RX ADMIN — CEFEPIME SCH MLS/HR: 1 INJECTION, POWDER, FOR SOLUTION INTRAMUSCULAR; INTRAVENOUS at 09:43

## 2019-02-11 RX ADMIN — LEVETIRACETAM 1 MLS/HR: 5 INJECTION INTRAVENOUS at 08:18

## 2019-02-11 RX ADMIN — HEPARIN SODIUM SCH UNIT: 5000 INJECTION, SOLUTION INTRAVENOUS; SUBCUTANEOUS at 08:22

## 2019-02-11 RX ADMIN — PROPOFOL 1 MLS/HR: 10 INJECTION, EMULSION INTRAVENOUS at 09:00

## 2019-02-11 RX ADMIN — COLLAGENASE SANTYL 1 APPLIC: 250 OINTMENT TOPICAL at 08:20

## 2019-02-11 RX ADMIN — HEPARIN SODIUM SCH UNIT: 5000 INJECTION, SOLUTION INTRAVENOUS; SUBCUTANEOUS at 20:40

## 2019-02-11 RX ADMIN — PROPOFOL SCH MLS/HR: 10 INJECTION, EMULSION INTRAVENOUS at 21:00

## 2019-02-11 RX ADMIN — PROPOFOL 1 MLS/HR: 10 INJECTION, EMULSION INTRAVENOUS at 21:00

## 2019-02-11 RX ADMIN — LEVETIRACETAM 1 MLS/HR: 100 INJECTION, SOLUTION INTRAVENOUS at 20:38

## 2019-02-11 RX ADMIN — CASTOR OIL AND BALSAM, PERU 1 APPLIC: 788; 87 OINTMENT TOPICAL at 15:00

## 2019-02-11 RX ADMIN — COLLAGENASE SANTYL SCH APPLIC: 250 OINTMENT TOPICAL at 08:20

## 2019-02-11 RX ADMIN — CEFEPIME 1 MLS/HR: 1 INJECTION, POWDER, FOR SOLUTION INTRAMUSCULAR; INTRAVENOUS at 09:43

## 2019-02-11 RX ADMIN — HEPARIN SODIUM 1 UNIT: 5000 INJECTION, SOLUTION INTRAVENOUS; SUBCUTANEOUS at 08:22

## 2019-02-11 RX ADMIN — CASTOR OIL AND BALSAM, PERU 1 APPLIC: 788; 87 OINTMENT TOPICAL at 20:45

## 2019-02-11 RX ADMIN — HEPARIN SODIUM 1 UNIT: 5000 INJECTION, SOLUTION INTRAVENOUS; SUBCUTANEOUS at 20:40

## 2019-02-11 NOTE — CONS
Assessment/Plan


Assessment/Plan


Hospital Course (Demo Recall)


No acute events overnight patient is on low-dose levo fed looks comfortable no 


fevers WBC 10.3 platelets 108 neutrophils 66.1 BUN 11 creatinine 0.48





Antimicrobials: Cefepime





Microbiology: Urine culture grew Streptococcus, sputum culture growing 


pseudomonas aeruginosa





Indwelling: Endotracheal tube orogastric tube Davila catheter left upper 


extremity PICC line





Physical examination: Well-developed chronically ill-appearing middle-aged woman


who is intubated in no distress.  Head atraumatic normocephalic.  Sclera 


nonicteric.  Neck is supple.  Chest rise symmetrical, breath sounds diminished 


bases.  Heart: S1-S2.  Abdomen soft bowel sounds present, hypoactive.  


Extremities cyanotic with trace edema





Assessment:


1.  Sepsis, resolving


2.  Gram-positive cocci bacteremia cw contaminant


3.  Respiratory failure


4.  New onset seizures


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


5.  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


6.  Urinary tract infection





Plan: Slowly improving, continue present care, antibiotics





Consultation Date/Type/Reason


Admit Date/Time


Feb 3, 2019 at 22:12


Initial Consult Date


2/5/19


Type of Consult


id


Requesting Provider:  LAURENCE CASTELLANO


Date/Time of Note


DATE: 2/11/19 


TIME: 11:13





Exam/Review of Systems


Exam


Vitals





Vital Signs


  Date      Temp  Pulse  Resp  B/P (MAP)   Pulse Ox  O2          O2 Flow    FiO2


Time                                                 Delivery    Rate


   2/11/19           70    14      100/70       100


     08:45                           (80)


   2/11/19  97.3                                     Mechanical


     08:00                                           Ventilator


   2/11/19                                                                    30


     08:00








Intake and Output





2/10/19


2/10/19


2/11/19





1515:00


23:00


07:00





IntakeIntake Total


812.504 ml


681.941 ml


718.238 ml





OutputOutput Total


800 ml


700 ml


875 ml





BalanceBalance


12.504 ml


-18.059 ml


-156.762 ml














Results


Result Diagram:  


2/11/19 0400                                                                    


           2/11/19 0400





Results 24hrs





Laboratory Tests


               Test
                                2/11/19
04:00


               White Blood Count                           10.3


               Red Blood Count                            2.85  L


               Hemoglobin                                  8.9  L


               Hematocrit                                 26.4  L


               Mean Corpuscular Volume                     92.6


               Mean Corpuscular Hemoglobin                 31.2


               Mean Corpuscular Hemoglobin
Concent        33.7  



               Red Cell Distribution Width                 13.2


               Platelet Count                             108  #L


               Mean Platelet Volume                       11.2  H


               Immature Granulocytes %                   0.700  H


               Neutrophils %                               66.1


               Lymphocytes %                              14.2  L


               Monocytes %                                14.9  H


               Eosinophils %                                3.6


               Basophils %                                  0.5


               Nucleated Red Blood Cells %                  0.0


               Immature Granulocytes #                   0.070  H


               Neutrophils #                                6.8


               Lymphocytes #                                1.5


               Monocytes #                                 1.5  H


               Eosinophils #                                0.4


               Basophils #                                  0.1


               Nucleated Red Blood Cells #                  0.0


               Sodium Level                                 135


               Potassium Level                              3.7


               Chloride Level                               105


               Carbon Dioxide Level                          29


               Anion Gap                                     1  L


               Blood Urea Nitrogen                           11


               Creatinine                                  0.48


               Est Glomerular Filtrat Rate
mL/min   > 60  



               Glucose Level                                138


               Lactic Acid Level                            1.4


               Calcium Level                               7.7  L








Medications


Medication





Current Medications


IV Flush (NS 3 ml) 3 ml PER PROTOCOL IV ;  Start 2/4/19 at 03:30


Ondansetron HCl (Zofran Inj) 4 mg Q6H  PRN IV NAUSEA/VOMITING;  Start 2/4/19 at 


03:30


Acetaminophen (Tylenol Tab) 650 mg Q6H  PRN PO .PAIN 1-3 OR TEMP;  Start 2/4/19 


at 03:30


Docusate Sodium (Colace) 100 mg Q12H  PRN PO .CONSTIPATION;  Start 2/4/19 at 


03:30


Magnesium Hydroxide (Milk Of Mag) 30 ml DAILY  PRN PO .CONSTIPATION;  Start 


2/4/19 at 03:30


Pantoprazole (Protonix Iv) 40 mg DAILY@06 IV  Last administered on 2/11/19at 


05:43; Admin Dose 40 MG;  Start 2/4/19 at 06:00


Heparin Sodium (Porcine) (Heparin  (5000 Units/1ml)) 5,000 unit Q12 SC  Last 


administered on 2/11/19at 08:22; Admin Dose 5,000 UNIT;  Start 2/4/19 at 09:00


Albuterol/ Ipratropium (Duoneb) 3 ml Q4H RESP THERAPY  PRN HHN SHORTNESS OF 


BREATH;  Start 2/4/19 at 03:30


Hydralazine HCl (Apresoline) 10 mg Q6H  PRN IV ELEVATED BLOOD PRESSURE;  Start 


2/4/19 at 03:30


Nitroglycerin (Nitroglycerin (Sl Tab) 0.4 Mg) 1 tab Q5M  PRN SL ANGINA;  Start 


2/4/19 at 03:30


Lorazepam (Ativan) 1 mg Q2H  PRN IV SEIZURES Last administered on 2/4/19at 


03:57; Admin Dose 1 MG;  Start 2/4/19 at 03:30


Miscellaneous Information (Pending Santyl Order For Wound Care) This patient 


ha... PRN  PRN XX WOUND CARE;  Start 2/4/19 at 05:00


Propofol 100 ml @  1.227 mls/ hr Q12H IV ;  Start 2/5/19 at 21:00


Norepinephrine 250 ml @  1.875 mls/ hr TITRATE IV  Last administered on 


2/10/19at 16:40; Admin Dose 6.563 MLS/HR;  Start 2/5/19 at 23:00


Collagenase (Santyl) 1 applic DAILY TOP  Last administered on 2/11/19at 08:20; 


Admin Dose 1 APPLIC;  Start 2/6/19 at 11:30


IV Flush (NS 10 ml) 10 ml PRN  PRN IV IV PROTOCOL;  Start 2/6/19 at 15:00


Cefepime HCl 50 ml @  100 mls/hr Q12 IVPB  Last administered on 2/11/19at 09:43;


Admin Dose 100 MLS/HR;  Start 2/8/19 at 13:00


Levetiracetam 100 ml @  400 mls/hr Q12 IVPB  Last administered on 2/11/19at 


08:18; Admin Dose 400 MLS/HR;  Start 2/9/19 at 21:00


Dextrose/Lactated Ringer's 1,000 ml @  20 mls/hr Q24H IV  Last administered on 


2/10/19at 09:54; Admin Dose 20 MLS/HR;  Start 2/10/19 at 09:00











RUKHSANA ZEPEDA NP            Feb 11, 2019 11:13

## 2019-02-11 NOTE — CONS
Consult Date/Type/Reason


Admit Date/Time


Feb 3, 2019 at 22:12


Initial Consult Date


2/5/19


Type of Consult


Pulmonary


Requesting Provider:  LAURENCE CASTELLANO


Date/Time of Note


DATE: 2/11/19 


TIME: 09:54





Subjective


Still somnolent on mechanical ventilation despite absence of sedation.





Objective





Vital Signs


  Date      Temp  Pulse  Resp  B/P (MAP)   Pulse Ox  O2          O2 Flow    FiO2


Time                                                 Delivery    Rate


   2/11/19           70    14      100/70       100


     08:45                           (80)


   2/11/19  97.3                                     Mechanical


     08:00                                           Ventilator


   2/11/19                                                                    30


     08:00








Intake and Output





2/10/19


2/10/19


2/11/19





1515:00


23:00


07:00





IntakeIntake Total


812.504 ml


681.941 ml


678.238 ml





OutputOutput Total


800 ml


700 ml


775 ml





BalanceBalance


12.504 ml


-18.059 ml


-96.762 ml











Exam


GENERAL: Chronically ill-appearing lady on mechanical ventilation


VITAL SIGNS:  per chart


NECK:  Supple.  No JVD or lymphadenopathy.


CARDIAC EXAM:  S1, S2. No added sounds or murmurs.


CHEST:  clear bilaterally, No added sounds, rales or wheezes


ABDOMEN:  Soft, nontender.  No guarding or rebound.


EXTREMITIES:  No cyanosis, clubbing or edema.


NEUROLOGIC:  Generalized weakness.  No focal deficits.





Vent Setting


Ventilator Support Mode:  AC


Fraction of Inspired Oxygen pe:  30


Positive End Expiratory Pressu:  5.0





Results/Medications


Result Diagram:  


2/11/19 0400                                                                    


           2/11/19 0400





Results 24 hrs





Laboratory Tests


       Test
                                2/10/19
10:40  2/11/19
04:00


       Stool Occult Blood                   NEGATIVE


       White Blood Count                                          10.3


       Red Blood Count                                           2.85  L


       Hemoglobin                                                 8.9  L


       Hematocrit                                                26.4  L


       Mean Corpuscular Volume                                    92.6


       Mean Corpuscular Hemoglobin                                31.2


       Mean Corpuscular Hemoglobin
Concent  
                    33.7  



       Red Cell Distribution Width                                13.2


       Platelet Count                                            108  #L


       Mean Platelet Volume                                      11.2  H


       Immature Granulocytes %                                  0.700  H


       Neutrophils %                                              66.1


       Lymphocytes %                                             14.2  L


       Monocytes %                                               14.9  H


       Eosinophils %                                               3.6


       Basophils %                                                 0.5


       Nucleated Red Blood Cells %                                 0.0


       Immature Granulocytes #                                  0.070  H


       Neutrophils #                                               6.8


       Lymphocytes #                                               1.5


       Monocytes #                                                1.5  H


       Eosinophils #                                               0.4


       Basophils #                                                 0.1


       Nucleated Red Blood Cells #                                 0.0


       Sodium Level                                                135


       Potassium Level                                             3.7


       Chloride Level                                              105


       Carbon Dioxide Level                                         29


       Anion Gap                                                    1  L


       Blood Urea Nitrogen                                          11


       Creatinine                                                 0.48


       Est Glomerular Filtrat Rate
mL/min   
              > 60  



       Glucose Level                                               138


       Lactic Acid Level                                           1.4


       Calcium Level                                              7.7  L





Medications





Current Medications


IV Flush (NS 3 ml) 3 ml PER PROTOCOL IV ;  Start 2/4/19 at 03:30


Ondansetron HCl (Zofran Inj) 4 mg Q6H  PRN IV NAUSEA/VOMITING;  Start 2/4/19 at 


03:30


Acetaminophen (Tylenol Tab) 650 mg Q6H  PRN PO .PAIN 1-3 OR TEMP;  Start 2/4/19 


at 03:30


Docusate Sodium (Colace) 100 mg Q12H  PRN PO .CONSTIPATION;  Start 2/4/19 at 


03:30


Magnesium Hydroxide (Milk Of Mag) 30 ml DAILY  PRN PO .CONSTIPATION;  Start 


2/4/19 at 03:30


Pantoprazole (Protonix Iv) 40 mg DAILY@06 IV  Last administered on 2/11/19at 


05:43; Admin Dose 40 MG;  Start 2/4/19 at 06:00


Heparin Sodium (Porcine) (Heparin  (5000 Units/1ml)) 5,000 unit Q12 SC  Last 


administered on 2/11/19at 08:22; Admin Dose 5,000 UNIT;  Start 2/4/19 at 09:00


Albuterol/ Ipratropium (Duoneb) 3 ml Q4H RESP THERAPY  PRN HHN SHORTNESS OF 


BREATH;  Start 2/4/19 at 03:30


Hydralazine HCl (Apresoline) 10 mg Q6H  PRN IV ELEVATED BLOOD PRESSURE;  Start 


2/4/19 at 03:30


Nitroglycerin (Nitroglycerin (Sl Tab) 0.4 Mg) 1 tab Q5M  PRN SL ANGINA;  Start 


2/4/19 at 03:30


Lorazepam (Ativan) 1 mg Q2H  PRN IV SEIZURES Last administered on 2/4/19at 


03:57; Admin Dose 1 MG;  Start 2/4/19 at 03:30


Miscellaneous Information (Pending [facility] Order For Wound Care) This patient 


ha... PRN  PRN XX WOUND CARE;  Start 2/4/19 at 05:00


Propofol 100 ml @  1.227 mls/ hr Q12H IV ;  Start 2/5/19 at 21:00


Norepinephrine 250 ml @  1.875 mls/ hr TITRATE IV  Last administered on 


2/10/19at 16:40; Admin Dose 6.563 MLS/HR;  Start 2/5/19 at 23:00


Collagenase (Santyl) 1 applic DAILY TOP  Last administered on 2/11/19at 08:20; 


Admin Dose 1 APPLIC;  Start 2/6/19 at 11:30


IV Flush (NS 10 ml) 10 ml PRN  PRN IV IV PROTOCOL;  Start 2/6/19 at 15:00


Cefepime HCl 50 ml @  100 mls/hr Q12 IVPB  Last administered on 2/11/19at 09:43;


Admin Dose 100 MLS/HR;  Start 2/8/19 at 13:00


Levetiracetam 100 ml @  400 mls/hr Q12 IVPB  Last administered on 2/11/19at 


08:18; Admin Dose 400 MLS/HR;  Start 2/9/19 at 21:00


Dextrose/Lactated Ringer's 1,000 ml @  20 mls/hr Q24H IV  Last administered on 


2/10/19at 09:54; Admin Dose 20 MLS/HR;  Start 2/10/19 at 09:00





Assessment/Plan


Hospital Course (Demo Recall)


IMP:


1.  Encephalopathy--post-ictal state s/p status epilepticus.


2.  Vent Dependence 2/2 #1


3.  Metabolic acidosis likely secondary to hypoperfusion.  Now improved


4.  Status post septic Shock


5.  s/p lactic acidosis 


 


RECS: 


1.  Titrate levophed to MAP > 65 mm Hg


2.  Intravenous antibiotics; de-escalate pending cultures


3.  Continue antiepileptics per Neuro 


4.  Minimize sedation; continue to follow neuro exam


5.  DVT and GI prophylaxis 





may require tracheostomy given neurological status.  Will discuss with family


cc 40mins.











RIC CAROLINA MD, [facility]     Feb 11, 2019 09:55

## 2019-02-11 NOTE — CONS
Assessment/Plan


Assessment/Plan


Hospital Course


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 


p/w ams.


She was additionally reported to have a seizure... for which neurology is co


nsulted.





Most clinically consistent with new onset epilepsy.





Initial EEG was notable for GPEDs and an electrographic seizure originating in 


the R temporal region..


s/p Dilantin load, with ongoing episodic staring spells (presumed seizures) 


noted clinically..


s/p Phenobarbital load on 2/5..with resolution of episodes suspicious for 


seizure











P:


Await repeat phb level, to ensure clearance from system


Continue Keppra bid for now; decrease to 250mg..


Limit other sedating medications where possible


Other medical management per primary


Will follow clinically





Consultation Date/Type/Reason


Admit Date/Time


Feb 3, 2019 at 22:12


Type of Consult


Neurology


Requesting Provider:  LAURENCE CASTELLANO


Date/Time of Note


DATE: 2/11/19 


TIME: 13:26





24 HR Interval Summary


Free Text/Dictation


Continues icu care





Exam


Vital Signs


Vitals





Vital Signs


  Date      Temp  Pulse  Resp  B/P (MAP)   Pulse Ox  O2          O2 Flow    FiO2


Time                                                 Delivery    Rate


   2/11/19           71


     12:00


   2/11/19  97.3           14  93/68 (76)       100  Mechanical


     12:00                                           Ventilator


   2/11/19                                                                    30


     11:00








Intake and Output





2/10/19


2/10/19


2/11/19





1515:00


23:00


07:00





IntakeIntake Total


812.504 ml


681.941 ml


718.238 ml





OutputOutput Total


800 ml


700 ml


875 ml





BalanceBalance


12.504 ml


-18.059 ml


-156.762 ml














Exam


PE:


Gen Appearance: No Apparent Distress


HEENT: Intubated


Cardiovascular: Regular rate


Abdomen: Soft


Extremities: Dry





NE:


The patient was comatose.





Cranial nerve examination was limited by mental status. Pupils were equal and 


reactive to light. There was no afferent pupillary defect. Funduscopic ex


amination was limited. Face was grossly symmetric, w/ present corneal and cough 


reflexes.





Tone was normal. Muscle bulk was normal. I did not see fasciculations. The 


patient withdrew to noxious stimulation x 4.





Coordination and gait testing was limited by mental status.





Arm and leg reflexes were within normal limits and symmetric. Richmond's sign was


absent. Plantar responses were flexor.











JOSE FRANCOIS                 Feb 11, 2019 13:31

## 2019-02-11 NOTE — PN
Date/Time of Note


Date/Time of Note


DATE: 2/11/19 


TIME: 09:57





Assessment/Plan


VTE Prophylaxis


Risk score (from Nsg)>0 risk:  5


SCD applied (from Nsg):  Yes


Pharmacological prophylaxis:  heparin





Lines/Catheters


IV Catheter Type (from Nrsg):  PICC Line


Central line still needed:  Yes


Urinary Cath still in place:  Yes


Reason Cath still needed:  other (indicate)





Assessment/Plan


Hospital Course


S:  remains intubated, minimally esponsive and on minimal pressor support





O:


Constitutional:  postures to painful stimuli, frail, chronically ill looking


Head:  atraumatic, normocephalic, NC, RAUDEL sluggish


Neck:  non-tender, supple


Respiratory:  coarse, diminished 


Cardiovascular:  regular rate and rhythm


Gastrointestinal:  S/ NT / ND / +BS


Extremities:  no edema, good radial pulses, frail, trace brittanie le edema 





assessment and Plan:


57 yo F with a slow early onset dementia, cause unknown who is non verbal at 


baseline who was brought in for abnormal jerking behavior concerning for se


izures 





# Seizure requiring intubation for airway protection


   -continue vent support for now


   -s/p aggressive seizure control for status epilepticus, has been getting 


serial EEGs


   -EEG from 2/10 showed : The slowing of the background indicates severe, 


diffuse cortical dysfunction of nonspecific etiology.


   -Neurology managing, patient on Keppra BID only for now


   -Avoid / Limit sedation if not ordered by neuro 





#Sespis shock with aspiration PNA and UTI 








# Chronic dementia/encephalopathy, 


   -nonverbal and bedbound at baseline.  Of gradual onset, no organic cause 


found despite extensive workup per 


   -cared for at home by  and children 24hours





# Remote History of West Nile virus infection





#Chronic dyslipidemia





#Chronic hypertension: currently hypotensive





#Chronic psychiatric d/c on risperdone with benztropine for tardive dyskinesia ?


   -dose of benztropine recently reduced 


   all pysch meds on hold for now 








Plan :


   -At this time continue ICU monitoring and management, f./u neuro plan, 


?prognosis 


   -Continue  tube feeds


   -Continue serial labs and close monitoring


   -Further interventions will depend on clinical course


   -POC discussed with nursing staff and consultants 


   - was also called via telephone and given update





Care time 40mins





Addendum:


-Spoke with Neuro, she expects a good recovery from a seizure standpoint, 


phenobarbital levels ordered, patient needs to clear phenobarb from her system.


Result Diagram:  


2/11/19 0400                                                                    


           2/11/19 0400





Results 24hrs





Laboratory Tests


       Test
                                2/10/19
10:40  2/11/19
04:00


       Stool Occult Blood                   NEGATIVE


       White Blood Count                                          10.3


       Red Blood Count                                           2.85  L


       Hemoglobin                                                 8.9  L


       Hematocrit                                                26.4  L


       Mean Corpuscular Volume                                    92.6


       Mean Corpuscular Hemoglobin                                31.2


       Mean Corpuscular Hemoglobin
Concent  
                    33.7  



       Red Cell Distribution Width                                13.2


       Platelet Count                                            108  #L


       Mean Platelet Volume                                      11.2  H


       Immature Granulocytes %                                  0.700  H


       Neutrophils %                                              66.1


       Lymphocytes %                                             14.2  L


       Monocytes %                                               14.9  H


       Eosinophils %                                               3.6


       Basophils %                                                 0.5


       Nucleated Red Blood Cells %                                 0.0


       Immature Granulocytes #                                  0.070  H


       Neutrophils #                                               6.8


       Lymphocytes #                                               1.5


       Monocytes #                                                1.5  H


       Eosinophils #                                               0.4


       Basophils #                                                 0.1


       Nucleated Red Blood Cells #                                 0.0


       Sodium Level                                                135


       Potassium Level                                             3.7


       Chloride Level                                              105


       Carbon Dioxide Level                                         29


       Anion Gap                                                    1  L


       Blood Urea Nitrogen                                          11


       Creatinine                                                 0.48


       Est Glomerular Filtrat Rate
mL/min   
              > 60  



       Glucose Level                                               138


       Lactic Acid Level                                           1.4


       Calcium Level                                              7.7  L








Exam/Review of Systems


Exam


Vitals





Vital Signs


  Date      Temp  Pulse  Resp  B/P (MAP)   Pulse Ox  O2          O2 Flow    FiO2


Time                                                 Delivery    Rate


   2/11/19           70    14      100/70       100


     08:45                           (80)


   2/11/19  97.3                                     Mechanical


     08:00                                           Ventilator


   2/11/19                                                                    30


     08:00








Intake and Output





2/10/19


2/10/19


2/11/19





1414:59


22:59


06:59





IntakeIntake Total


867.504 ml


682.504 ml


721.438 ml





OutputOutput Total


800 ml


625 ml


850 ml





BalanceBalance


67.504 ml


57.504 ml


-128.562 ml














Results


Results 24hrs





Laboratory Tests


       Test
                                2/10/19
10:40  2/11/19
04:00


       Stool Occult Blood                   NEGATIVE


       White Blood Count                                          10.3


       Red Blood Count                                           2.85  L


       Hemoglobin                                                 8.9  L


       Hematocrit                                                26.4  L


       Mean Corpuscular Volume                                    92.6


       Mean Corpuscular Hemoglobin                                31.2


       Mean Corpuscular Hemoglobin
Concent  
                    33.7  



       Red Cell Distribution Width                                13.2


       Platelet Count                                            108  #L


       Mean Platelet Volume                                      11.2  H


       Immature Granulocytes %                                  0.700  H


       Neutrophils %                                              66.1


       Lymphocytes %                                             14.2  L


       Monocytes %                                               14.9  H


       Eosinophils %                                               3.6


       Basophils %                                                 0.5


       Nucleated Red Blood Cells %                                 0.0


       Immature Granulocytes #                                  0.070  H


       Neutrophils #                                               6.8


       Lymphocytes #                                               1.5


       Monocytes #                                                1.5  H


       Eosinophils #                                               0.4


       Basophils #                                                 0.1


       Nucleated Red Blood Cells #                                 0.0


       Sodium Level                                                135


       Potassium Level                                             3.7


       Chloride Level                                              105


       Carbon Dioxide Level                                         29


       Anion Gap                                                    1  L


       Blood Urea Nitrogen                                          11


       Creatinine                                                 0.48


       Est Glomerular Filtrat Rate
mL/min   
              > 60  



       Glucose Level                                               138


       Lactic Acid Level                                           1.4


       Calcium Level                                              7.7  L








Imaging


Imaging


EEG 2/11


IMPRESSION:


Abnormal electroencephalogram due to: severe diffuse slowing.





COMMENT:


The slowing of the background indicates severe, diffuse cortical dysfunction of 


nonspecific etiology.





Medications


Medication





Current Medications


IV Flush (NS 3 ml) 3 ml PER PROTOCOL IV ;  Start 2/4/19 at 03:30


Ondansetron HCl (Zofran Inj) 4 mg Q6H  PRN IV NAUSEA/VOMITING;  Start 2/4/19 at 


03:30


Acetaminophen (Tylenol Tab) 650 mg Q6H  PRN PO .PAIN 1-3 OR TEMP;  Start 2/4/19 


at 03:30


Docusate Sodium (Colace) 100 mg Q12H  PRN PO .CONSTIPATION;  Start 2/4/19 at 


03:30


Magnesium Hydroxide (Milk Of Mag) 30 ml DAILY  PRN PO .CONSTIPATION;  Start 


2/4/19 at 03:30


Pantoprazole (Protonix Iv) 40 mg DAILY@06 IV  Last administered on 2/11/19at 


05:43; Admin Dose 40 MG;  Start 2/4/19 at 06:00


Heparin Sodium (Porcine) (Heparin  (5000 Units/1ml)) 5,000 unit Q12 SC  Last 


administered on 2/11/19at 08:22; Admin Dose 5,000 UNIT;  Start 2/4/19 at 09:00


Albuterol/ Ipratropium (Duoneb) 3 ml Q4H RESP THERAPY  PRN HHN SHORTNESS OF 


BREATH;  Start 2/4/19 at 03:30


Hydralazine HCl (Apresoline) 10 mg Q6H  PRN IV ELEVATED BLOOD PRESSURE;  Start 


2/4/19 at 03:30


Nitroglycerin (Nitroglycerin (Sl Tab) 0.4 Mg) 1 tab Q5M  PRN SL ANGINA;  Start 


2/4/19 at 03:30


Lorazepam (Ativan) 1 mg Q2H  PRN IV SEIZURES Last administered on 2/4/19at 


03:57; Admin Dose 1 MG;  Start 2/4/19 at 03:30


Miscellaneous Information (Pending Santyl Order For Wound Care) This patient 


ha... PRN  PRN XX WOUND CARE;  Start 2/4/19 at 05:00


Propofol 100 ml @  1.227 mls/ hr Q12H IV ;  Start 2/5/19 at 21:00


Norepinephrine 250 ml @  1.875 mls/ hr TITRATE IV  Last administered on 2/10/19a


t 16:40; Admin Dose 6.563 MLS/HR;  Start 2/5/19 at 23:00


Collagenase (Santyl) 1 applic DAILY TOP  Last administered on 2/11/19at 08:20; 


Admin Dose 1 APPLIC;  Start 2/6/19 at 11:30


IV Flush (NS 10 ml) 10 ml PRN  PRN IV IV PROTOCOL;  Start 2/6/19 at 15:00


Cefepime HCl 50 ml @  100 mls/hr Q12 IVPB  Last administered on 2/11/19at 09:43;


Admin Dose 100 MLS/HR;  Start 2/8/19 at 13:00


Levetiracetam 100 ml @  400 mls/hr Q12 IVPB  Last administered on 2/11/19at 


08:18; Admin Dose 400 MLS/HR;  Start 2/9/19 at 21:00


Dextrose/Lactated Ringer's 1,000 ml @  20 mls/hr Q24H IV  Last administered on 


2/10/19at 09:54; Admin Dose 20 MLS/HR;  Start 2/10/19 at 09:00











TEJAS SALMERON                Feb 11, 2019 10:04

## 2019-02-12 VITALS — DIASTOLIC BLOOD PRESSURE: 74 MMHG | HEART RATE: 65 BPM | SYSTOLIC BLOOD PRESSURE: 105 MMHG | RESPIRATION RATE: 12 BRPM

## 2019-02-12 VITALS — RESPIRATION RATE: 18 BRPM | DIASTOLIC BLOOD PRESSURE: 73 MMHG | HEART RATE: 67 BPM | SYSTOLIC BLOOD PRESSURE: 101 MMHG

## 2019-02-12 VITALS — SYSTOLIC BLOOD PRESSURE: 105 MMHG | HEART RATE: 70 BPM | RESPIRATION RATE: 14 BRPM | DIASTOLIC BLOOD PRESSURE: 74 MMHG

## 2019-02-12 VITALS — DIASTOLIC BLOOD PRESSURE: 66 MMHG | SYSTOLIC BLOOD PRESSURE: 95 MMHG | RESPIRATION RATE: 12 BRPM | HEART RATE: 64 BPM

## 2019-02-12 VITALS — HEART RATE: 69 BPM | RESPIRATION RATE: 14 BRPM | SYSTOLIC BLOOD PRESSURE: 97 MMHG | DIASTOLIC BLOOD PRESSURE: 73 MMHG

## 2019-02-12 VITALS — HEART RATE: 66 BPM | RESPIRATION RATE: 13 BRPM | SYSTOLIC BLOOD PRESSURE: 101 MMHG | DIASTOLIC BLOOD PRESSURE: 70 MMHG

## 2019-02-12 VITALS — HEART RATE: 67 BPM | SYSTOLIC BLOOD PRESSURE: 110 MMHG | DIASTOLIC BLOOD PRESSURE: 73 MMHG | RESPIRATION RATE: 15 BRPM

## 2019-02-12 VITALS — SYSTOLIC BLOOD PRESSURE: 99 MMHG | DIASTOLIC BLOOD PRESSURE: 72 MMHG | RESPIRATION RATE: 14 BRPM | HEART RATE: 70 BPM

## 2019-02-12 VITALS — SYSTOLIC BLOOD PRESSURE: 91 MMHG | HEART RATE: 73 BPM | RESPIRATION RATE: 14 BRPM | DIASTOLIC BLOOD PRESSURE: 60 MMHG

## 2019-02-12 VITALS — SYSTOLIC BLOOD PRESSURE: 108 MMHG | HEART RATE: 68 BPM | RESPIRATION RATE: 13 BRPM | DIASTOLIC BLOOD PRESSURE: 74 MMHG

## 2019-02-12 VITALS — RESPIRATION RATE: 14 BRPM

## 2019-02-12 VITALS — DIASTOLIC BLOOD PRESSURE: 67 MMHG | SYSTOLIC BLOOD PRESSURE: 95 MMHG | HEART RATE: 69 BPM | RESPIRATION RATE: 14 BRPM

## 2019-02-12 VITALS — DIASTOLIC BLOOD PRESSURE: 71 MMHG | SYSTOLIC BLOOD PRESSURE: 107 MMHG | HEART RATE: 66 BPM | RESPIRATION RATE: 16 BRPM

## 2019-02-12 VITALS — RESPIRATION RATE: 16 BRPM | DIASTOLIC BLOOD PRESSURE: 64 MMHG | SYSTOLIC BLOOD PRESSURE: 97 MMHG | HEART RATE: 68 BPM

## 2019-02-12 VITALS — DIASTOLIC BLOOD PRESSURE: 65 MMHG | RESPIRATION RATE: 15 BRPM | SYSTOLIC BLOOD PRESSURE: 99 MMHG | HEART RATE: 70 BPM

## 2019-02-12 VITALS — DIASTOLIC BLOOD PRESSURE: 61 MMHG | HEART RATE: 70 BPM | SYSTOLIC BLOOD PRESSURE: 91 MMHG | RESPIRATION RATE: 15 BRPM

## 2019-02-12 VITALS — DIASTOLIC BLOOD PRESSURE: 68 MMHG | RESPIRATION RATE: 16 BRPM | HEART RATE: 70 BPM | SYSTOLIC BLOOD PRESSURE: 95 MMHG

## 2019-02-12 VITALS — RESPIRATION RATE: 12 BRPM | SYSTOLIC BLOOD PRESSURE: 116 MMHG | HEART RATE: 73 BPM | DIASTOLIC BLOOD PRESSURE: 71 MMHG

## 2019-02-12 VITALS — SYSTOLIC BLOOD PRESSURE: 100 MMHG | HEART RATE: 66 BPM | RESPIRATION RATE: 14 BRPM | DIASTOLIC BLOOD PRESSURE: 72 MMHG

## 2019-02-12 VITALS — SYSTOLIC BLOOD PRESSURE: 95 MMHG | RESPIRATION RATE: 22 BRPM | HEART RATE: 71 BPM | DIASTOLIC BLOOD PRESSURE: 64 MMHG

## 2019-02-12 VITALS — DIASTOLIC BLOOD PRESSURE: 72 MMHG | RESPIRATION RATE: 15 BRPM | HEART RATE: 72 BPM | SYSTOLIC BLOOD PRESSURE: 106 MMHG

## 2019-02-12 VITALS — HEART RATE: 69 BPM | RESPIRATION RATE: 15 BRPM | SYSTOLIC BLOOD PRESSURE: 100 MMHG | DIASTOLIC BLOOD PRESSURE: 69 MMHG

## 2019-02-12 VITALS — SYSTOLIC BLOOD PRESSURE: 99 MMHG | DIASTOLIC BLOOD PRESSURE: 70 MMHG | HEART RATE: 65 BPM | RESPIRATION RATE: 15 BRPM

## 2019-02-12 VITALS — SYSTOLIC BLOOD PRESSURE: 98 MMHG | HEART RATE: 67 BPM | DIASTOLIC BLOOD PRESSURE: 69 MMHG | RESPIRATION RATE: 14 BRPM

## 2019-02-12 VITALS — RESPIRATION RATE: 17 BRPM | SYSTOLIC BLOOD PRESSURE: 103 MMHG | DIASTOLIC BLOOD PRESSURE: 67 MMHG | HEART RATE: 78 BPM

## 2019-02-12 VITALS — HEART RATE: 69 BPM | SYSTOLIC BLOOD PRESSURE: 93 MMHG | DIASTOLIC BLOOD PRESSURE: 62 MMHG | RESPIRATION RATE: 14 BRPM

## 2019-02-12 VITALS — DIASTOLIC BLOOD PRESSURE: 66 MMHG | SYSTOLIC BLOOD PRESSURE: 102 MMHG | HEART RATE: 71 BPM | RESPIRATION RATE: 14 BRPM

## 2019-02-12 VITALS — DIASTOLIC BLOOD PRESSURE: 74 MMHG | RESPIRATION RATE: 15 BRPM | HEART RATE: 66 BPM | SYSTOLIC BLOOD PRESSURE: 107 MMHG

## 2019-02-12 VITALS — RESPIRATION RATE: 17 BRPM | HEART RATE: 72 BPM | SYSTOLIC BLOOD PRESSURE: 101 MMHG | DIASTOLIC BLOOD PRESSURE: 67 MMHG

## 2019-02-12 VITALS — HEART RATE: 70 BPM | DIASTOLIC BLOOD PRESSURE: 76 MMHG | SYSTOLIC BLOOD PRESSURE: 117 MMHG | RESPIRATION RATE: 13 BRPM

## 2019-02-12 VITALS — HEART RATE: 69 BPM | RESPIRATION RATE: 14 BRPM | DIASTOLIC BLOOD PRESSURE: 76 MMHG | SYSTOLIC BLOOD PRESSURE: 107 MMHG

## 2019-02-12 VITALS — HEART RATE: 71 BPM | DIASTOLIC BLOOD PRESSURE: 58 MMHG | SYSTOLIC BLOOD PRESSURE: 88 MMHG | RESPIRATION RATE: 17 BRPM

## 2019-02-12 VITALS — RESPIRATION RATE: 15 BRPM

## 2019-02-12 VITALS — DIASTOLIC BLOOD PRESSURE: 73 MMHG | RESPIRATION RATE: 16 BRPM | HEART RATE: 69 BPM | SYSTOLIC BLOOD PRESSURE: 107 MMHG

## 2019-02-12 VITALS — HEART RATE: 70 BPM | SYSTOLIC BLOOD PRESSURE: 99 MMHG | RESPIRATION RATE: 16 BRPM | DIASTOLIC BLOOD PRESSURE: 66 MMHG

## 2019-02-12 VITALS — HEART RATE: 69 BPM | DIASTOLIC BLOOD PRESSURE: 78 MMHG | SYSTOLIC BLOOD PRESSURE: 99 MMHG | RESPIRATION RATE: 17 BRPM

## 2019-02-12 VITALS — DIASTOLIC BLOOD PRESSURE: 77 MMHG | HEART RATE: 74 BPM | SYSTOLIC BLOOD PRESSURE: 108 MMHG | RESPIRATION RATE: 19 BRPM

## 2019-02-12 VITALS — SYSTOLIC BLOOD PRESSURE: 88 MMHG | HEART RATE: 71 BPM | RESPIRATION RATE: 14 BRPM | DIASTOLIC BLOOD PRESSURE: 66 MMHG

## 2019-02-12 VITALS — SYSTOLIC BLOOD PRESSURE: 102 MMHG | RESPIRATION RATE: 14 BRPM | HEART RATE: 66 BPM | DIASTOLIC BLOOD PRESSURE: 70 MMHG

## 2019-02-12 VITALS — RESPIRATION RATE: 14 BRPM | DIASTOLIC BLOOD PRESSURE: 76 MMHG | HEART RATE: 67 BPM | SYSTOLIC BLOOD PRESSURE: 93 MMHG

## 2019-02-12 VITALS — HEART RATE: 73 BPM | DIASTOLIC BLOOD PRESSURE: 64 MMHG | SYSTOLIC BLOOD PRESSURE: 95 MMHG | RESPIRATION RATE: 17 BRPM

## 2019-02-12 VITALS — DIASTOLIC BLOOD PRESSURE: 59 MMHG | RESPIRATION RATE: 14 BRPM | SYSTOLIC BLOOD PRESSURE: 86 MMHG | HEART RATE: 72 BPM

## 2019-02-12 VITALS — SYSTOLIC BLOOD PRESSURE: 106 MMHG | RESPIRATION RATE: 14 BRPM | DIASTOLIC BLOOD PRESSURE: 74 MMHG | HEART RATE: 69 BPM

## 2019-02-12 VITALS — HEART RATE: 66 BPM | DIASTOLIC BLOOD PRESSURE: 68 MMHG | RESPIRATION RATE: 16 BRPM | SYSTOLIC BLOOD PRESSURE: 99 MMHG

## 2019-02-12 VITALS — HEART RATE: 73 BPM | SYSTOLIC BLOOD PRESSURE: 108 MMHG | RESPIRATION RATE: 16 BRPM | DIASTOLIC BLOOD PRESSURE: 78 MMHG

## 2019-02-12 VITALS — RESPIRATION RATE: 14 BRPM | HEART RATE: 70 BPM | SYSTOLIC BLOOD PRESSURE: 86 MMHG | DIASTOLIC BLOOD PRESSURE: 62 MMHG

## 2019-02-12 VITALS — SYSTOLIC BLOOD PRESSURE: 109 MMHG | RESPIRATION RATE: 19 BRPM | DIASTOLIC BLOOD PRESSURE: 75 MMHG | HEART RATE: 69 BPM

## 2019-02-12 VITALS — DIASTOLIC BLOOD PRESSURE: 80 MMHG | SYSTOLIC BLOOD PRESSURE: 111 MMHG | RESPIRATION RATE: 16 BRPM | HEART RATE: 68 BPM

## 2019-02-12 VITALS — RESPIRATION RATE: 14 BRPM | DIASTOLIC BLOOD PRESSURE: 70 MMHG | HEART RATE: 70 BPM | SYSTOLIC BLOOD PRESSURE: 100 MMHG

## 2019-02-12 VITALS — RESPIRATION RATE: 15 BRPM | DIASTOLIC BLOOD PRESSURE: 61 MMHG | SYSTOLIC BLOOD PRESSURE: 89 MMHG | HEART RATE: 68 BPM

## 2019-02-12 VITALS — SYSTOLIC BLOOD PRESSURE: 101 MMHG | RESPIRATION RATE: 16 BRPM | DIASTOLIC BLOOD PRESSURE: 69 MMHG | HEART RATE: 70 BPM

## 2019-02-12 VITALS — SYSTOLIC BLOOD PRESSURE: 112 MMHG | RESPIRATION RATE: 14 BRPM | DIASTOLIC BLOOD PRESSURE: 78 MMHG | HEART RATE: 70 BPM

## 2019-02-12 VITALS — HEART RATE: 72 BPM | RESPIRATION RATE: 14 BRPM | DIASTOLIC BLOOD PRESSURE: 72 MMHG | SYSTOLIC BLOOD PRESSURE: 100 MMHG

## 2019-02-12 VITALS — HEART RATE: 67 BPM | RESPIRATION RATE: 14 BRPM | DIASTOLIC BLOOD PRESSURE: 73 MMHG | SYSTOLIC BLOOD PRESSURE: 103 MMHG

## 2019-02-12 VITALS — DIASTOLIC BLOOD PRESSURE: 73 MMHG | RESPIRATION RATE: 14 BRPM | SYSTOLIC BLOOD PRESSURE: 101 MMHG | HEART RATE: 63 BPM

## 2019-02-12 VITALS — RESPIRATION RATE: 14 BRPM | DIASTOLIC BLOOD PRESSURE: 70 MMHG | SYSTOLIC BLOOD PRESSURE: 97 MMHG | HEART RATE: 69 BPM

## 2019-02-12 VITALS — RESPIRATION RATE: 15 BRPM | HEART RATE: 73 BPM | SYSTOLIC BLOOD PRESSURE: 98 MMHG | DIASTOLIC BLOOD PRESSURE: 69 MMHG

## 2019-02-12 VITALS — DIASTOLIC BLOOD PRESSURE: 74 MMHG | HEART RATE: 72 BPM | RESPIRATION RATE: 19 BRPM | SYSTOLIC BLOOD PRESSURE: 102 MMHG

## 2019-02-12 VITALS — SYSTOLIC BLOOD PRESSURE: 96 MMHG | DIASTOLIC BLOOD PRESSURE: 70 MMHG | RESPIRATION RATE: 14 BRPM | HEART RATE: 68 BPM

## 2019-02-12 VITALS — HEART RATE: 69 BPM | SYSTOLIC BLOOD PRESSURE: 85 MMHG | DIASTOLIC BLOOD PRESSURE: 60 MMHG | RESPIRATION RATE: 20 BRPM

## 2019-02-12 VITALS — HEART RATE: 69 BPM | RESPIRATION RATE: 15 BRPM | DIASTOLIC BLOOD PRESSURE: 71 MMHG | SYSTOLIC BLOOD PRESSURE: 96 MMHG

## 2019-02-12 VITALS — RESPIRATION RATE: 15 BRPM | HEART RATE: 68 BPM | SYSTOLIC BLOOD PRESSURE: 92 MMHG | DIASTOLIC BLOOD PRESSURE: 69 MMHG

## 2019-02-12 VITALS — HEART RATE: 69 BPM | SYSTOLIC BLOOD PRESSURE: 113 MMHG | DIASTOLIC BLOOD PRESSURE: 74 MMHG | RESPIRATION RATE: 16 BRPM

## 2019-02-12 VITALS — HEART RATE: 79 BPM | SYSTOLIC BLOOD PRESSURE: 106 MMHG | RESPIRATION RATE: 23 BRPM | DIASTOLIC BLOOD PRESSURE: 59 MMHG

## 2019-02-12 VITALS — RESPIRATION RATE: 15 BRPM | DIASTOLIC BLOOD PRESSURE: 74 MMHG | SYSTOLIC BLOOD PRESSURE: 105 MMHG | HEART RATE: 68 BPM

## 2019-02-12 VITALS — DIASTOLIC BLOOD PRESSURE: 80 MMHG | SYSTOLIC BLOOD PRESSURE: 102 MMHG | HEART RATE: 73 BPM | RESPIRATION RATE: 14 BRPM

## 2019-02-12 VITALS — SYSTOLIC BLOOD PRESSURE: 105 MMHG | HEART RATE: 65 BPM | RESPIRATION RATE: 14 BRPM | DIASTOLIC BLOOD PRESSURE: 73 MMHG

## 2019-02-12 VITALS — DIASTOLIC BLOOD PRESSURE: 71 MMHG | RESPIRATION RATE: 14 BRPM | HEART RATE: 68 BPM | SYSTOLIC BLOOD PRESSURE: 103 MMHG

## 2019-02-12 VITALS — HEART RATE: 66 BPM | SYSTOLIC BLOOD PRESSURE: 104 MMHG | DIASTOLIC BLOOD PRESSURE: 74 MMHG | RESPIRATION RATE: 15 BRPM

## 2019-02-12 VITALS — DIASTOLIC BLOOD PRESSURE: 69 MMHG | RESPIRATION RATE: 16 BRPM | HEART RATE: 72 BPM | SYSTOLIC BLOOD PRESSURE: 102 MMHG

## 2019-02-12 VITALS — SYSTOLIC BLOOD PRESSURE: 100 MMHG | RESPIRATION RATE: 14 BRPM | HEART RATE: 69 BPM | DIASTOLIC BLOOD PRESSURE: 69 MMHG

## 2019-02-12 VITALS — RESPIRATION RATE: 14 BRPM | HEART RATE: 69 BPM | SYSTOLIC BLOOD PRESSURE: 97 MMHG | DIASTOLIC BLOOD PRESSURE: 71 MMHG

## 2019-02-12 VITALS — SYSTOLIC BLOOD PRESSURE: 95 MMHG | HEART RATE: 71 BPM | DIASTOLIC BLOOD PRESSURE: 64 MMHG | RESPIRATION RATE: 16 BRPM

## 2019-02-12 VITALS — SYSTOLIC BLOOD PRESSURE: 99 MMHG | DIASTOLIC BLOOD PRESSURE: 74 MMHG | HEART RATE: 68 BPM | RESPIRATION RATE: 15 BRPM

## 2019-02-12 VITALS — SYSTOLIC BLOOD PRESSURE: 99 MMHG | RESPIRATION RATE: 18 BRPM | HEART RATE: 68 BPM | DIASTOLIC BLOOD PRESSURE: 66 MMHG

## 2019-02-12 VITALS — SYSTOLIC BLOOD PRESSURE: 101 MMHG | RESPIRATION RATE: 16 BRPM | DIASTOLIC BLOOD PRESSURE: 67 MMHG | HEART RATE: 67 BPM

## 2019-02-12 VITALS — DIASTOLIC BLOOD PRESSURE: 74 MMHG | HEART RATE: 70 BPM | RESPIRATION RATE: 14 BRPM | SYSTOLIC BLOOD PRESSURE: 105 MMHG

## 2019-02-12 VITALS — HEART RATE: 71 BPM | RESPIRATION RATE: 17 BRPM | DIASTOLIC BLOOD PRESSURE: 74 MMHG | SYSTOLIC BLOOD PRESSURE: 110 MMHG

## 2019-02-12 VITALS — SYSTOLIC BLOOD PRESSURE: 99 MMHG | RESPIRATION RATE: 14 BRPM | HEART RATE: 70 BPM | DIASTOLIC BLOOD PRESSURE: 70 MMHG

## 2019-02-12 VITALS — DIASTOLIC BLOOD PRESSURE: 76 MMHG | SYSTOLIC BLOOD PRESSURE: 112 MMHG | HEART RATE: 69 BPM | RESPIRATION RATE: 14 BRPM

## 2019-02-12 VITALS — SYSTOLIC BLOOD PRESSURE: 114 MMHG | HEART RATE: 71 BPM | DIASTOLIC BLOOD PRESSURE: 73 MMHG | RESPIRATION RATE: 15 BRPM

## 2019-02-12 VITALS — DIASTOLIC BLOOD PRESSURE: 83 MMHG | SYSTOLIC BLOOD PRESSURE: 114 MMHG | HEART RATE: 71 BPM | RESPIRATION RATE: 15 BRPM

## 2019-02-12 VITALS — SYSTOLIC BLOOD PRESSURE: 99 MMHG | DIASTOLIC BLOOD PRESSURE: 70 MMHG | HEART RATE: 70 BPM | RESPIRATION RATE: 16 BRPM

## 2019-02-12 VITALS — SYSTOLIC BLOOD PRESSURE: 99 MMHG | RESPIRATION RATE: 18 BRPM | DIASTOLIC BLOOD PRESSURE: 62 MMHG | HEART RATE: 72 BPM

## 2019-02-12 VITALS — HEART RATE: 71 BPM | DIASTOLIC BLOOD PRESSURE: 72 MMHG | RESPIRATION RATE: 15 BRPM | SYSTOLIC BLOOD PRESSURE: 101 MMHG

## 2019-02-12 VITALS — SYSTOLIC BLOOD PRESSURE: 102 MMHG | DIASTOLIC BLOOD PRESSURE: 66 MMHG | RESPIRATION RATE: 14 BRPM | HEART RATE: 73 BPM

## 2019-02-12 VITALS — SYSTOLIC BLOOD PRESSURE: 96 MMHG | HEART RATE: 71 BPM | DIASTOLIC BLOOD PRESSURE: 67 MMHG | RESPIRATION RATE: 14 BRPM

## 2019-02-12 VITALS — SYSTOLIC BLOOD PRESSURE: 102 MMHG | RESPIRATION RATE: 15 BRPM | DIASTOLIC BLOOD PRESSURE: 77 MMHG | HEART RATE: 69 BPM

## 2019-02-12 VITALS — RESPIRATION RATE: 14 BRPM | SYSTOLIC BLOOD PRESSURE: 106 MMHG | DIASTOLIC BLOOD PRESSURE: 71 MMHG | HEART RATE: 69 BPM

## 2019-02-12 VITALS — RESPIRATION RATE: 17 BRPM | DIASTOLIC BLOOD PRESSURE: 57 MMHG | HEART RATE: 72 BPM | SYSTOLIC BLOOD PRESSURE: 89 MMHG

## 2019-02-12 VITALS — HEART RATE: 72 BPM | RESPIRATION RATE: 14 BRPM | SYSTOLIC BLOOD PRESSURE: 107 MMHG | DIASTOLIC BLOOD PRESSURE: 75 MMHG

## 2019-02-12 VITALS — DIASTOLIC BLOOD PRESSURE: 66 MMHG | HEART RATE: 70 BPM | RESPIRATION RATE: 14 BRPM | SYSTOLIC BLOOD PRESSURE: 98 MMHG

## 2019-02-12 VITALS — SYSTOLIC BLOOD PRESSURE: 105 MMHG | RESPIRATION RATE: 14 BRPM | DIASTOLIC BLOOD PRESSURE: 73 MMHG | HEART RATE: 69 BPM

## 2019-02-12 VITALS — RESPIRATION RATE: 15 BRPM | HEART RATE: 75 BPM | SYSTOLIC BLOOD PRESSURE: 103 MMHG | DIASTOLIC BLOOD PRESSURE: 71 MMHG

## 2019-02-12 VITALS — RESPIRATION RATE: 16 BRPM | DIASTOLIC BLOOD PRESSURE: 54 MMHG | SYSTOLIC BLOOD PRESSURE: 85 MMHG | HEART RATE: 74 BPM

## 2019-02-12 VITALS — HEART RATE: 74 BPM | RESPIRATION RATE: 14 BRPM | DIASTOLIC BLOOD PRESSURE: 76 MMHG | SYSTOLIC BLOOD PRESSURE: 103 MMHG

## 2019-02-12 VITALS — RESPIRATION RATE: 16 BRPM

## 2019-02-12 VITALS — SYSTOLIC BLOOD PRESSURE: 103 MMHG | RESPIRATION RATE: 14 BRPM | DIASTOLIC BLOOD PRESSURE: 70 MMHG | HEART RATE: 68 BPM

## 2019-02-12 VITALS — HEART RATE: 70 BPM | SYSTOLIC BLOOD PRESSURE: 101 MMHG | DIASTOLIC BLOOD PRESSURE: 71 MMHG | RESPIRATION RATE: 15 BRPM

## 2019-02-12 LAB
ADD MAN DIFF?: NO
ALANINE AMINOTRANSFERASE: 22 IU/L (ref 13–69)
ALBUMIN: 1.8 G/DL (ref 3.3–4.9)
ALKALINE PHOSPHATASE: 28 IU/L (ref 42–121)
AMMONIA: 17 UMOL/L (ref 9–30)
ANION GAP: 5 (ref 5–13)
ASPARTATE AMINO TRANSFERASE: 23 IU/L (ref 15–46)
BASOPHIL #: 0 10^3/UL (ref 0–0.1)
BASOPHILS %: 0.3 % (ref 0–2)
BILIRUBIN,DIRECT: 0 MG/DL (ref 0–0.2)
BILIRUBIN,TOTAL: 0 MG/DL (ref 0.2–1.3)
BLOOD UREA NITROGEN: 13 MG/DL (ref 7–20)
CALCIUM: 8.1 MG/DL (ref 8.4–10.2)
CARBON DIOXIDE: 29 MMOL/L (ref 21–31)
CHLORIDE: 98 MMOL/L (ref 97–110)
CREATININE: 0.4 MG/DL (ref 0.44–1)
EOSINOPHILS #: 0.4 10^3/UL (ref 0–0.5)
EOSINOPHILS %: 3.6 % (ref 0–7)
GLUCOSE: 139 MG/DL (ref 70–220)
HEMATOCRIT: 25.8 % (ref 37–47)
HEMOGLOBIN: 8.7 G/DL (ref 12–16)
HSV 1 IGG ANTIBODY: 15.4 INDEX
HSV 2 IGG ANTIBODY: <0.9 INDEX
IMMATURE GRANS #M: 0.09 10^3/UL (ref 0–0.03)
IMMATURE GRANS % (M): 0.8 % (ref 0–0.43)
LYMPHOCYTES #: 1.4 10^3/UL (ref 0.8–2.9)
LYMPHOCYTES %: 11.5 % (ref 15–51)
MAGNESIUM: 1.8 MG/DL (ref 1.7–2.5)
MEAN CORPUSCULAR HEMOGLOBIN: 31.4 PG (ref 29–33)
MEAN CORPUSCULAR HGB CONC: 33.7 G/DL (ref 32–37)
MEAN CORPUSCULAR VOLUME: 93.1 FL (ref 82–101)
MEAN PLATELET VOLUME: 10.6 FL (ref 7.4–10.4)
MONOCYTE #: 1.3 10^3/UL (ref 0.3–0.9)
MONOCYTES %: 10.7 % (ref 0–11)
NEUTROPHIL #: 8.8 10^3/UL (ref 1.6–7.5)
NEUTROPHILS %: 73.1 % (ref 39–77)
NUCLEATED RED BLOOD CELLS #: 0 10^3/UL (ref 0–0)
NUCLEATED RED BLOOD CELLS%: 0 /100WBC (ref 0–0)
PHENOBARBITAL: 17.4 MG/L (ref 15–40)
PLATELET COUNT: 170 10^3/UL (ref 140–415)
POSITIVE DIFF: (no result)
POTASSIUM: 3.7 MMOL/L (ref 3.5–5.1)
RED BLOOD COUNT: 2.77 10^6/UL (ref 4.2–5.4)
RED CELL DISTRIBUTION WIDTH: 12.8 % (ref 11.5–14.5)
SODIUM: 132 MMOL/L (ref 135–144)
TOTAL PROTEIN: 3.9 G/DL (ref 6.1–8.1)
WHITE BLOOD COUNT: 12 10^3/UL (ref 4.8–10.8)

## 2019-02-12 RX ADMIN — LEVETIRACETAM 1 MLS/HR: 100 INJECTION, SOLUTION INTRAVENOUS at 09:13

## 2019-02-12 RX ADMIN — HEPARIN SODIUM SCH UNIT: 5000 INJECTION, SOLUTION INTRAVENOUS; SUBCUTANEOUS at 20:39

## 2019-02-12 RX ADMIN — LANSOPRAZOLE 1 MG: 30 CAPSULE, DELAYED RELEASE PELLETS ORAL at 06:17

## 2019-02-12 RX ADMIN — LEVETIRACETAM SCH MLS/HR: 100 INJECTION, SOLUTION INTRAVENOUS at 21:19

## 2019-02-12 RX ADMIN — CEFEPIME SCH MLS/HR: 1 INJECTION, POWDER, FOR SOLUTION INTRAMUSCULAR; INTRAVENOUS at 20:37

## 2019-02-12 RX ADMIN — HEPARIN SODIUM SCH UNIT: 5000 INJECTION, SOLUTION INTRAVENOUS; SUBCUTANEOUS at 08:11

## 2019-02-12 RX ADMIN — PROPOFOL 1 MLS/HR: 10 INJECTION, EMULSION INTRAVENOUS at 09:00

## 2019-02-12 RX ADMIN — CEFEPIME SCH MLS/HR: 1 INJECTION, POWDER, FOR SOLUTION INTRAMUSCULAR; INTRAVENOUS at 08:06

## 2019-02-12 RX ADMIN — LEVETIRACETAM 1 MLS/HR: 100 INJECTION, SOLUTION INTRAVENOUS at 21:19

## 2019-02-12 RX ADMIN — OXYTOCIN 1 MLS/HR: 10 INJECTION, SOLUTION INTRAMUSCULAR; INTRAVENOUS at 06:17

## 2019-02-12 RX ADMIN — PROPOFOL SCH MLS/HR: 10 INJECTION, EMULSION INTRAVENOUS at 09:00

## 2019-02-12 RX ADMIN — POTASSIUM CHLORIDE SCH MLS/HR: 2 INJECTION, SOLUTION, CONCENTRATE INTRAVENOUS at 09:00

## 2019-02-12 RX ADMIN — COLLAGENASE SANTYL 1 APPLIC: 250 OINTMENT TOPICAL at 08:06

## 2019-02-12 RX ADMIN — PROPOFOL 1 MLS/HR: 10 INJECTION, EMULSION INTRAVENOUS at 21:00

## 2019-02-12 RX ADMIN — PROPOFOL SCH MLS/HR: 10 INJECTION, EMULSION INTRAVENOUS at 21:00

## 2019-02-12 RX ADMIN — COLLAGENASE SANTYL SCH APPLIC: 250 OINTMENT TOPICAL at 08:06

## 2019-02-12 RX ADMIN — POTASSIUM CHLORIDE SCH MLS/HR: 2 INJECTION, SOLUTION, CONCENTRATE INTRAVENOUS at 06:17

## 2019-02-12 RX ADMIN — CEFEPIME 1 MLS/HR: 1 INJECTION, POWDER, FOR SOLUTION INTRAMUSCULAR; INTRAVENOUS at 08:06

## 2019-02-12 RX ADMIN — HEPARIN SODIUM 1 UNIT: 5000 INJECTION, SOLUTION INTRAVENOUS; SUBCUTANEOUS at 20:39

## 2019-02-12 RX ADMIN — CEFEPIME 1 MLS/HR: 1 INJECTION, POWDER, FOR SOLUTION INTRAMUSCULAR; INTRAVENOUS at 20:37

## 2019-02-12 RX ADMIN — CASTOR OIL AND BALSAM, PERU 1 APPLIC: 788; 87 OINTMENT TOPICAL at 20:38

## 2019-02-12 RX ADMIN — OXYTOCIN 1 MLS/HR: 10 INJECTION, SOLUTION INTRAMUSCULAR; INTRAVENOUS at 09:00

## 2019-02-12 RX ADMIN — CASTOR OIL AND BALSAM, PERU 1 APPLIC: 788; 87 OINTMENT TOPICAL at 08:06

## 2019-02-12 RX ADMIN — LEVETIRACETAM SCH MLS/HR: 100 INJECTION, SOLUTION INTRAVENOUS at 09:13

## 2019-02-12 RX ADMIN — HEPARIN SODIUM 1 UNIT: 5000 INJECTION, SOLUTION INTRAVENOUS; SUBCUTANEOUS at 08:11

## 2019-02-12 RX ADMIN — LANSOPRAZOLE SCH MG: 30 CAPSULE, DELAYED RELEASE PELLETS ORAL at 06:17

## 2019-02-12 NOTE — CONS
Assessment/Plan


Assessment/Plan


Hospital Course (Demo Recall)


No acute changes overnight patient remains on low-dose of Levophed had been 


afebrile and more responsive per report WBC today 12 platelets 1 7 neutrophils 


73.1 BUN 13 creatinine 0.40


Antimicrobials: Cefepime





Microbiology: Urine culture grew Streptococcus, sputum culture growing 


pseudomonas aeruginosa





Indwelling: Endotracheal tube orogastric tube Davila catheter left upper 


extremity PICC line





Physical examination: Well-developed chronically ill-appearing middle-aged woman


who is intubated in no distress.  Head atraumatic normocephalic.  Sclera 


nonicteric.  Neck is supple.  Chest rise symmetrical, breath sounds diminished 


bases.  Heart: S1-S2.  Abdomen soft bowel sounds present, hypoactive.  


Extremities cyanotic with trace edema





Assessment:


1.  Sepsis 


2.  Gram-positive cocci bacteremia cw contaminant


3.  Respiratory failure


4.  New onset seizures


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


5.  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


6.  Urinary tract infection





Plan: Remains unchanged, continue present care, antibiotics, neurology/pulmonary


recommendations





Consultation Date/Type/Reason


Admit Date/Time


Feb 3, 2019 at 22:12


Initial Consult Date


2/5/19


Type of Consult


id


Requesting Provider:  LAURENCE CASTELLANO


Date/Time of Note


DATE: 2/12/19 


TIME: 12:41





Exam/Review of Systems


Exam


Vitals





Vital Signs


  Date      Temp  Pulse  Resp  B/P (MAP)   Pulse Ox  O2          O2 Flow    FiO2


Time                                                 Delivery    Rate


   2/12/19           71    15                   100                           30


     11:30


   2/12/19                         107/73            Mechanical


     10:00                           (84)            Ventilator


   2/12/19  98.5


     08:00








Intake and Output





2/11/19 2/11/19 2/12/19





1414:59


22:59


06:59





IntakeIntake Total


736.4 ml


770.85 ml


694.4 ml





OutputOutput Total


900 ml


600 ml


335 ml





BalanceBalance


-163.6 ml


170.85 ml


359.4 ml














Results


Result Diagram:  


2/12/19 0330                                                                    


           2/12/19 0330





Results 24hrs





Laboratory Tests


       Test
                                2/12/19
03:20  2/12/19
03:30


       Ammonia                                       17


       White Blood Count                                         12.0  H


       Red Blood Count                                           2.77  L


       Hemoglobin                                                 8.7  L


       Hematocrit                                                25.8  L


       Mean Corpuscular Volume                                    93.1


       Mean Corpuscular Hemoglobin                                31.4


       Mean Corpuscular Hemoglobin
Concent  
                    33.7  



       Red Cell Distribution Width                                12.8


       Platelet Count                                             170  #


       Mean Platelet Volume                                      10.6  H


       Immature Granulocytes %                                  0.800  H


       Neutrophils %                                              73.1


       Lymphocytes %                                             11.5  L


       Monocytes %                                                10.7


       Eosinophils %                                               3.6


       Basophils %                                                 0.3


       Nucleated Red Blood Cells %                                 0.0


       Immature Granulocytes #                                  0.090  H


       Neutrophils #                                              8.8  H


       Lymphocytes #                                               1.4


       Monocytes #                                                1.3  H


       Eosinophils #                                               0.4


       Basophils #                                                 0.0


       Nucleated Red Blood Cells #                                 0.0


       Sodium Level                                               132  L


       Potassium Level                                             3.7


       Chloride Level                                               98


       Carbon Dioxide Level                                         29


       Anion Gap                                                     5


       Blood Urea Nitrogen                                          13


       Creatinine                                                0.40  L


       Est Glomerular Filtrat Rate
mL/min   
              > 60  



       Glucose Level                                               139


       Calcium Level                                              8.1  L


       Magnesium Level                                             1.8


       Total Bilirubin                                            0.0  L


       Direct Bilirubin                                           0.00


       Indirect Bilirubin                                          0.0


       Aspartate Amino Transf
(AST/SGOT)    
                      23  



       Alanine Aminotransferase
(ALT/SGPT)  
                      22  



       Alkaline Phosphatase                                        28  L


       Total Protein                                              3.9  L


       Albumin                                                    1.8  L








Medications


Medication





Current Medications


IV Flush (NS 3 ml) 3 ml PER PROTOCOL IV ;  Start 2/4/19 at 03:30


Ondansetron HCl (Zofran Inj) 4 mg Q6H  PRN IV NAUSEA/VOMITING;  Start 2/4/19 at 


03:30


Acetaminophen (Tylenol Tab) 650 mg Q6H  PRN PO .PAIN 1-3 OR TEMP;  Start 2/4/19 


at 03:30


Docusate Sodium (Colace) 100 mg Q12H  PRN PO .CONSTIPATION;  Start 2/4/19 at 


03:30


Magnesium Hydroxide (Milk Of Mag) 30 ml DAILY  PRN PO .CONSTIPATION;  Start 


2/4/19 at 03:30


Heparin Sodium (Porcine) (Heparin  (5000 Units/1ml)) 5,000 unit Q12 SC  Last 


administered on 2/12/19at 08:11; Admin Dose 5,000 UNIT;  Start 2/4/19 at 09:00


Albuterol/ Ipratropium (Duoneb) 3 ml Q4H RESP THERAPY  PRN HHN SHORTNESS OF 


BREATH;  Start 2/4/19 at 03:30


Hydralazine HCl (Apresoline) 10 mg Q6H  PRN IV ELEVATED BLOOD PRESSURE;  Start 


2/4/19 at 03:30


Nitroglycerin (Nitroglycerin (Sl Tab) 0.4 Mg) 1 tab Q5M  PRN SL ANGINA;  Start 


2/4/19 at 03:30


Lorazepam (Ativan) 1 mg Q2H  PRN IV SEIZURES Last administered on 2/4/19at 


03:57; Admin Dose 1 MG;  Start 2/4/19 at 03:30


Miscellaneous Information (Pending Santyl Order For Wound Care) This patient ha


... PRN  PRN XX WOUND CARE;  Start 2/4/19 at 05:00


Propofol 100 ml @  1.227 mls/ hr Q12H IV ;  Start 2/5/19 at 21:00


Norepinephrine 250 ml @  1.875 mls/ hr TITRATE IV  Last administered on 


2/10/19at 16:40; Admin Dose 6.563 MLS/HR;  Start 2/5/19 at 23:00


Collagenase (Santyl) 1 applic DAILY TOP  Last administered on 2/12/19at 08:06; 


Admin Dose 1 APPLIC;  Start 2/6/19 at 11:30


IV Flush (NS 10 ml) 10 ml PRN  PRN IV IV PROTOCOL;  Start 2/6/19 at 15:00


Cefepime HCl 50 ml @  100 mls/hr Q12 IVPB  Last administered on 2/12/19at 08:06;


Admin Dose 100 MLS/HR;  Start 2/8/19 at 13:00


Dextrose/Lactated Ringer's 1,000 ml @  20 mls/hr Q24H IV  Last administered on 


2/12/19at 06:17; Admin Dose 20 MLS/HR;  Start 2/10/19 at 09:00


Levetiracetam 250 mg/Dextrose 102.5 ml @  430 mls/hr Q12 IVPB  Last administered


on 2/12/19at 09:13; Admin Dose 430 MLS/HR;  Start 2/11/19 at 21:00


Lansoprazole (Prevacid) 30 mg DAILY@06 PO  Last administered on 2/12/19at 06:17;


Admin Dose 30 MG;  Start 2/12/19 at 06:00











RUKHSANA ZEPEDA NP            Feb 12, 2019 12:43

## 2019-02-12 NOTE — CONS
Consult Date/Type/Reason


Admit Date/Time


Feb 3, 2019 at 22:12


Initial Consult Date


2/5/19


Type of Consult


Pulmonary


Requesting Provider:  LAURENCE CASTELLANO


Date/Time of Note


DATE: 2/12/19 


TIME: 10:26





Subjective


Remains lethargic on mechanical ventilation.  Occasionally opening eyes but not 


following commands.  Grimaces to painful stimuli.





Objective





Vital Signs


  Date      Temp  Pulse  Resp  B/P (MAP)   Pulse Ox  O2          O2 Flow    FiO2


Time                                                 Delivery    Rate


   2/12/19           69    16      107/73       100  Mechanical


     10:00                           (84)            Ventilator


   2/12/19                                                                    30


     08:00


   2/12/19  98.5


     08:00








Intake and Output





2/11/19 2/11/19 2/12/19





1515:00


23:00


07:00





IntakeIntake Total


737.35 ml


768.9 ml


632.6 ml





OutputOutput Total


900 ml


540 ml


295 ml





BalanceBalance


-162.65 ml


228.9 ml


337.6 ml











Exam


GENERAL: Chronically ill-appearing lady on mechanical ventilation


VITAL SIGNS:  per chart


NECK:  Supple.  No JVD or lymphadenopathy.


CARDIAC EXAM:  S1, S2. No added sounds or murmurs.


CHEST:  clear bilaterally, No added sounds, rales or wheezes


ABDOMEN:  Soft, nontender.  No guarding or rebound.


EXTREMITIES:  No cyanosis, clubbing or edema.


NEUROLOGIC:  Generalized weakness.  Unable to assess





Vent Setting


Ventilator Support Mode:  AC


Fraction of Inspired Oxygen pe:  30


Positive End Expiratory Pressu:  5.0





Results/Medications


Result Diagram:  


2/12/19 0330                                                                    


           2/12/19 0330





Results 24 hrs





Laboratory Tests


       Test
                                2/12/19
03:20  2/12/19
03:30


       Ammonia                                       17


       White Blood Count                                         12.0  H


       Red Blood Count                                           2.77  L


       Hemoglobin                                                 8.7  L


       Hematocrit                                                25.8  L


       Mean Corpuscular Volume                                    93.1


       Mean Corpuscular Hemoglobin                                31.4


       Mean Corpuscular Hemoglobin
Concent  
                    33.7  



       Red Cell Distribution Width                                12.8


       Platelet Count                                             170  #


       Mean Platelet Volume                                      10.6  H


       Immature Granulocytes %                                  0.800  H


       Neutrophils %                                              73.1


       Lymphocytes %                                             11.5  L


       Monocytes %                                                10.7


       Eosinophils %                                               3.6


       Basophils %                                                 0.3


       Nucleated Red Blood Cells %                                 0.0


       Immature Granulocytes #                                  0.090  H


       Neutrophils #                                              8.8  H


       Lymphocytes #                                               1.4


       Monocytes #                                                1.3  H


       Eosinophils #                                               0.4


       Basophils #                                                 0.0


       Nucleated Red Blood Cells #                                 0.0


       Sodium Level                                               132  L


       Potassium Level                                             3.7


       Chloride Level                                               98


       Carbon Dioxide Level                                         29


       Anion Gap                                                     5


       Blood Urea Nitrogen                                          13


       Creatinine                                                0.40  L


       Est Glomerular Filtrat Rate
mL/min   
              > 60  



       Glucose Level                                               139


       Calcium Level                                              8.1  L


       Magnesium Level                                             1.8


       Total Bilirubin                                            0.0  L


       Direct Bilirubin                                           0.00


       Indirect Bilirubin                                          0.0


       Aspartate Amino Transf
(AST/SGOT)    
                      23  



       Alanine Aminotransferase
(ALT/SGPT)  
                      22  



       Alkaline Phosphatase                                        28  L


       Total Protein                                              3.9  L


       Albumin                                                    1.8  L





Medications





Current Medications


IV Flush (NS 3 ml) 3 ml PER PROTOCOL IV ;  Start 2/4/19 at 03:30


Ondansetron HCl (Zofran Inj) 4 mg Q6H  PRN IV NAUSEA/VOMITING;  Start 2/4/19 at 


03:30


Acetaminophen (Tylenol Tab) 650 mg Q6H  PRN PO .PAIN 1-3 OR TEMP;  Start 2/4/19 


at 03:30


Docusate Sodium (Colace) 100 mg Q12H  PRN PO .CONSTIPATION;  Start 2/4/19 at 


03:30


Magnesium Hydroxide (Milk Of Mag) 30 ml DAILY  PRN PO .CONSTIPATION;  Start 


2/4/19 at 03:30


Heparin Sodium (Porcine) (Heparin  (5000 Units/1ml)) 5,000 unit Q12 SC  Last 


administered on 2/12/19at 08:11; Admin Dose 5,000 UNIT;  Start 2/4/19 at 09:00


Albuterol/ Ipratropium (Duoneb) 3 ml Q4H RESP THERAPY  PRN HHN SHORTNESS OF 


BREATH;  Start 2/4/19 at 03:30


Hydralazine HCl (Apresoline) 10 mg Q6H  PRN IV ELEVATED BLOOD PRESSURE;  Start 


2/4/19 at 03:30


Nitroglycerin (Nitroglycerin (Sl Tab) 0.4 Mg) 1 tab Q5M  PRN SL ANGINA;  Start 


2/4/19 at 03:30


Lorazepam (Ativan) 1 mg Q2H  PRN IV SEIZURES Last administered on 2/4/19at 


03:57; Admin Dose 1 MG;  Start 2/4/19 at 03:30


Miscellaneous Information (Pending [facility] Order For Wound Care) This patient 


ha... PRN  PRN XX WOUND CARE;  Start 2/4/19 at 05:00


Propofol 100 ml @  1.227 mls/ hr Q12H IV ;  Start 2/5/19 at 21:00


Norepinephrine 250 ml @  1.875 mls/ hr TITRATE IV  Last administered on 


2/10/19at 16:40; Admin Dose 6.563 MLS/HR;  Start 2/5/19 at 23:00


Collagenase (Santyl) 1 applic DAILY TOP  Last administered on 2/12/19at 08:06; 


Admin Dose 1 APPLIC;  Start 2/6/19 at 11:30


IV Flush (NS 10 ml) 10 ml PRN  PRN IV IV PROTOCOL;  Start 2/6/19 at 15:00


Cefepime HCl 50 ml @  100 mls/hr Q12 IVPB  Last administered on 2/12/19at 08:06;


Admin Dose 100 MLS/HR;  Start 2/8/19 at 13:00


Dextrose/Lactated Ringer's 1,000 ml @  20 mls/hr Q24H IV  Last administered on 


2/12/19at 06:17; Admin Dose 20 MLS/HR;  Start 2/10/19 at 09:00


Levetiracetam 250 mg/Dextrose 102.5 ml @  430 mls/hr Q12 IVPB  Last administered


on 2/12/19at 09:13; Admin Dose 430 MLS/HR;  Start 2/11/19 at 21:00


Lansoprazole (Prevacid) 30 mg DAILY@06 PO  Last administered on 2/12/19at 06:17;


Admin Dose 30 MG;  Start 2/12/19 at 06:00





Assessment/Plan


Hospital Course (Demo Recall)


IMP:


1.  Encephalopathy--post-ictal state s/p status epilepticus.  Possibly secondary


to elevated phenobarbital level.


2.  Vent Dependence 2/2 #1


3.  Metabolic acidosis likely secondary to hypoperfusion.  Now improved


4.  Status post septic Shock


5.  s/p lactic acidosis 


 


RECS: 


1.  Titrate levophed to MAP > 65 mm Hg


2.  Intravenous antibiotics; de-escalate pending cultures


3.  Continue antiepileptics per Neuro 


4.  Minimize sedation; continue to follow neuro exam


5.  DVT and GI prophylaxis 





Long discussion with family at bedside today.  Discussed goals of care.  They 


asked appropriate questions and we discussed either extubation if she has 


significant improvement in her neurological status versus tracheostomy versus 


extubation and comfort measures.  Currently family leaning towards extubation 


with comfort measures but given her poor quality of life prior to this admissi


on.  They will discuss goals of care amongst themselves and we will plan to meet


at the end of this week.   also present will give family long-term 


placement options if they so desire.











RIC CAROLINA MD, [facility]     Feb 12, 2019 10:28

## 2019-02-12 NOTE — CONS
Assessment/Plan


Assessment/Plan


Hospital Course


59 yo F with hx of West Nile Virus NOS and reported chronic encephalopathy who 


p/w ams.


She was additionally reported to have a seizure... for which neurology is co


nsulted.





Most clinically consistent with new onset epilepsy.





Initial EEG was notable for GPEDs and an electrographic seizure originating in 


the R temporal region..


s/p Dilantin load, with ongoing episodic staring spells (presumed seizures) 


noted clinically..


s/p Phenobarbital load on 2/5..with resolution of episodes suspicious for 


seizure











P:


Repeat phb level, to ensure clearance from system


Continue Keppra 250 bid for now


Limit other sedating medications where possible


Other medical management per primary


Will follow clinically





Consultation Date/Type/Reason


Admit Date/Time


Feb 3, 2019 at 22:12


Type of Consult


Neurology


Reason for Consultation


new onset seizures


Requesting Provider:  LAURENCE CASTELLANO


Date/Time of Note


DATE: 2/12/19 


TIME: 15:37





24 HR Interval Summary


Free Text/Dictation


Continues critical care. No acute events reported at this time.


Subjective hx not possible:  pt non-verbal, pt critical status





Exam


Vital Signs


Vitals





Vital Signs


  Date      Temp  Pulse  Resp  B/P (MAP)   Pulse Ox  O2          O2 Flow    FiO2


Time                                                 Delivery    Rate


   2/12/19           70    14      112/78       100


     14:30                           (89)


   2/12/19                                           Mechanical


     14:00                                           Ventilator


   2/12/19  97.5


     12:00


   2/12/19                                                                    30


     11:30








Intake and Output





2/11/19 2/11/19 2/12/19





1515:00


23:00


07:00





IntakeIntake Total


737.35 ml


768.9 ml


693.5 ml





OutputOutput Total


900 ml


540 ml


345 ml





BalanceBalance


-162.65 ml


228.9 ml


348.5 ml














Exam


PE:


Gen Appearance: No Apparent Distress


HEENT: Intubated


Cardiovascular: Regular rate


Abdomen: Soft


Extremities: Dry





NE:


The patient was obtunded; opened eyes to noxious stimuli. Did not track or 


follow commands. 





Cranial nerve examination was limited by mental status. Pupils were equal and 


reactive to light. There was no afferent pupillary defect. Funduscopic exami


nation was limited. Face was grossly symmetric, w/ present corneal and cough 


reflexes.





Tone was normal. Muscle bulk was normal. I did not see fasciculations. The 


patient withdrew to noxious stimulation x 4.





Coordination and gait testing was limited by mental status.





Arm and leg reflexes were within normal limits and symmetric. Richmond's sign was


absent. Plantar responses were flexor.











JULIETA SINGH NP          Feb 12, 2019 15:37

## 2019-02-12 NOTE — PN
Date/Time of Note


Date/Time of Note


DATE: 2/12/19 


TIME: 11:26





Assessment/Plan


VTE Prophylaxis


Risk score (from Nsg)>0 risk:  5


SCD applied (from Nsg):  Yes


Pharmacological prophylaxis:  heparin





Lines/Catheters


IV Catheter Type (from Nrsg):  PICC Line


Central line still needed:  Yes


Urinary Cath still in place:  Yes


Reason Cath still needed:  other (indicate)





Assessment/Plan


Hospital Course


S:  remains intubated, minimally Responsive and on minimal pressor support, 


family meeting done with pulm today





O:


Constitutional:  postures to painful stimuli, frail, chronically ill looking


Head:  atraumatic, normocephalic, NC, RAUDEL sluggish


Neck:  non-tender, supple


Respiratory:  coarse, diminished 


Cardiovascular:  regular rate and rhythm


Gastrointestinal:  S/ NT / ND / +BS


Extremities:  no edema, good radial pulses, frail, trace brittanie le edema 





assessment and Plan:


57 yo F with a slow early onset dementia, cause unknown who is non verbal at 


baseline who was brought in for abnormal jerking behavior concerning for 


seizures 





# Seizure requiring intubation for airway protection


   -continue vent support for now


   -s/p aggressive seizure control for status epilepticus, has been getting 


serial EEGs


   -EEG from 2/10 showed : The slowing of the background indicates severe, 


diffuse cortical dysfunction of nonspecific etiology.


   -Neurology managing, patient on Keppra BID only for now


   -Avoid / Limit sedation if not ordered by neuro 





#Sepsis shock with aspiration PNA and UTI 


   - urine growing Streptococcus


   - Respiratory cultures growing Pseudomonas which is pansensitive 


   - ID managing antibiotics





# Chronic dementia/encephalopathy, 


   -nonverbal and bedbound at baseline.  Of gradual onset, no organic cause 


found despite extensive workup per 


   -cared for at home by  and children 24hours





# Remote History of West Nile virus infection





#Chronic dyslipidemia





#Chronic hypertension: currently hypotensive





#Chronic psychiatric d/o on risperidone with benztropine for tardive dyskinesia 


?


   -dose of benztropine recently reduced 


   all pysch meds on hold for now 








Plan :


   -At this time continue ICU monitoring and management,


   -Spoke with neurology yesterday, plan is to give time for phenobarbital to 


wear off, neuro is expectant of improvement back to baseline, continue to defer 


to their recommendations 


   -Continue  tube feeds


   -Continue serial labs and close monitoring


   -Further interventions will depend on clinical course


   -POC discussed with nursing staff and consultants and family at bedside 


   


Prophylaxis : Heparin / Lansoprazole 





Care time 40mins


Result Diagram:  


2/12/19 0330                                                                    


           2/12/19 0330





Results 24hrs





Laboratory Tests


       Test
                                2/12/19
03:20  2/12/19
03:30


       Ammonia                                       17


       White Blood Count                                         12.0  H


       Red Blood Count                                           2.77  L


       Hemoglobin                                                 8.7  L


       Hematocrit                                                25.8  L


       Mean Corpuscular Volume                                    93.1


       Mean Corpuscular Hemoglobin                                31.4


       Mean Corpuscular Hemoglobin
Concent  
                    33.7  



       Red Cell Distribution Width                                12.8


       Platelet Count                                             170  #


       Mean Platelet Volume                                      10.6  H


       Immature Granulocytes %                                  0.800  H


       Neutrophils %                                              73.1


       Lymphocytes %                                             11.5  L


       Monocytes %                                                10.7


       Eosinophils %                                               3.6


       Basophils %                                                 0.3


       Nucleated Red Blood Cells %                                 0.0


       Immature Granulocytes #                                  0.090  H


       Neutrophils #                                              8.8  H


       Lymphocytes #                                               1.4


       Monocytes #                                                1.3  H


       Eosinophils #                                               0.4


       Basophils #                                                 0.0


       Nucleated Red Blood Cells #                                 0.0


       Sodium Level                                               132  L


       Potassium Level                                             3.7


       Chloride Level                                               98


       Carbon Dioxide Level                                         29


       Anion Gap                                                     5


       Blood Urea Nitrogen                                          13


       Creatinine                                                0.40  L


       Est Glomerular Filtrat Rate
mL/min   
              > 60  



       Glucose Level                                               139


       Calcium Level                                              8.1  L


       Magnesium Level                                             1.8


       Total Bilirubin                                            0.0  L


       Direct Bilirubin                                           0.00


       Indirect Bilirubin                                          0.0


       Aspartate Amino Transf
(AST/SGOT)    
                      23  



       Alanine Aminotransferase
(ALT/SGPT)  
                      22  



       Alkaline Phosphatase                                        28  L


       Total Protein                                              3.9  L


       Albumin                                                    1.8  L








Exam/Review of Systems


Exam


Vitals





Vital Signs


  Date      Temp  Pulse  Resp  B/P (MAP)   Pulse Ox  O2          O2 Flow    FiO2


Time                                                 Delivery    Rate


   2/12/19           69    16      107/73       100  Mechanical


     10:00                           (84)            Ventilator


   2/12/19                                                                    30


     08:00


   2/12/19  98.5


     08:00








Intake and Output





2/11/19 2/11/19 2/12/19





1515:00


23:00


07:00





IntakeIntake Total


737.35 ml


768.9 ml


653.5 ml





OutputOutput Total


900 ml


540 ml


295 ml





BalanceBalance


-162.65 ml


228.9 ml


358.5 ml














Results


Results 24hrs





Laboratory Tests


       Test
                                2/12/19
03:20  2/12/19
03:30


       Ammonia                                       17


       White Blood Count                                         12.0  H


       Red Blood Count                                           2.77  L


       Hemoglobin                                                 8.7  L


       Hematocrit                                                25.8  L


       Mean Corpuscular Volume                                    93.1


       Mean Corpuscular Hemoglobin                                31.4


       Mean Corpuscular Hemoglobin
Concent  
                    33.7  



       Red Cell Distribution Width                                12.8


       Platelet Count                                             170  #


       Mean Platelet Volume                                      10.6  H


       Immature Granulocytes %                                  0.800  H


       Neutrophils %                                              73.1


       Lymphocytes %                                             11.5  L


       Monocytes %                                                10.7


       Eosinophils %                                               3.6


       Basophils %                                                 0.3


       Nucleated Red Blood Cells %                                 0.0


       Immature Granulocytes #                                  0.090  H


       Neutrophils #                                              8.8  H


       Lymphocytes #                                               1.4


       Monocytes #                                                1.3  H


       Eosinophils #                                               0.4


       Basophils #                                                 0.0


       Nucleated Red Blood Cells #                                 0.0


       Sodium Level                                               132  L


       Potassium Level                                             3.7


       Chloride Level                                               98


       Carbon Dioxide Level                                         29


       Anion Gap                                                     5


       Blood Urea Nitrogen                                          13


       Creatinine                                                0.40  L


       Est Glomerular Filtrat Rate
mL/min   
              > 60  



       Glucose Level                                               139


       Calcium Level                                              8.1  L


       Magnesium Level                                             1.8


       Total Bilirubin                                            0.0  L


       Direct Bilirubin                                           0.00


       Indirect Bilirubin                                          0.0


       Aspartate Amino Transf
(AST/SGOT)    
                      23  



       Alanine Aminotransferase
(ALT/SGPT)  
                      22  



       Alkaline Phosphatase                                        28  L


       Total Protein                                              3.9  L


       Albumin                                                    1.8  L








Medications


Medication





Current Medications


IV Flush (NS 3 ml) 3 ml PER PROTOCOL IV ;  Start 2/4/19 at 03:30


Ondansetron HCl (Zofran Inj) 4 mg Q6H  PRN IV NAUSEA/VOMITING;  Start 2/4/19 at 


03:30


Acetaminophen (Tylenol Tab) 650 mg Q6H  PRN PO .PAIN 1-3 OR TEMP;  Start 2/4/19 


at 03:30


Docusate Sodium (Colace) 100 mg Q12H  PRN PO .CONSTIPATION;  Start 2/4/19 at 


03:30


Magnesium Hydroxide (Milk Of Mag) 30 ml DAILY  PRN PO .CONSTIPATION;  Start 


2/4/19 at 03:30


Heparin Sodium (Porcine) (Heparin  (5000 Units/1ml)) 5,000 unit Q12 SC  Last 


administered on 2/12/19at 08:11; Admin Dose 5,000 UNIT;  Start 2/4/19 at 09:00


Albuterol/ Ipratropium (Duoneb) 3 ml Q4H RESP THERAPY  PRN HHN SHORTNESS OF 


BREATH;  Start 2/4/19 at 03:30


Hydralazine HCl (Apresoline) 10 mg Q6H  PRN IV ELEVATED BLOOD PRESSURE;  Start 


2/4/19 at 03:30


Nitroglycerin (Nitroglycerin (Sl Tab) 0.4 Mg) 1 tab Q5M  PRN SL ANGINA;  Start 


2/4/19 at 03:30


Lorazepam (Ativan) 1 mg Q2H  PRN IV SEIZURES Last administered on 2/4/19at 


03:57; Admin Dose 1 MG;  Start 2/4/19 at 03:30


Miscellaneous Information (Pending Santyl Order For Wound Care) This patient 


ha... PRN  PRN XX WOUND CARE;  Start 2/4/19 at 05:00


Propofol 100 ml @  1.227 mls/ hr Q12H IV ;  Start 2/5/19 at 21:00


Norepinephrine 250 ml @  1.875 mls/ hr TITRATE IV  Last administered on 


2/10/19at 16:40; Admin Dose 6.563 MLS/HR;  Start 2/5/19 at 23:00


Collagenase (Santyl) 1 applic DAILY TOP  Last administered on 2/12/19at 08:06; 


Admin Dose 1 APPLIC;  Start 2/6/19 at 11:30


IV Flush (NS 10 ml) 10 ml PRN  PRN IV IV PROTOCOL;  Start 2/6/19 at 15:00


Cefepime HCl 50 ml @  100 mls/hr Q12 IVPB  Last administered on 2/12/19at 08:06;


Admin Dose 100 MLS/HR;  Start 2/8/19 at 13:00


Dextrose/Lactated Ringer's 1,000 ml @  20 mls/hr Q24H IV  Last administered on 


2/12/19at 06:17; Admin Dose 20 MLS/HR;  Start 2/10/19 at 09:00


Levetiracetam 250 mg/Dextrose 102.5 ml @  430 mls/hr Q12 IVPB  Last administered


on 2/12/19at 09:13; Admin Dose 430 MLS/HR;  Start 2/11/19 at 21:00


Lansoprazole (Prevacid) 30 mg DAILY@06 PO  Last administered on 2/12/19at 06:17;


Admin Dose 30 MG;  Start 2/12/19 at 06:00











TEJAS SALMERON                Feb 12, 2019 11:33

## 2019-02-13 VITALS — SYSTOLIC BLOOD PRESSURE: 102 MMHG | DIASTOLIC BLOOD PRESSURE: 71 MMHG | HEART RATE: 69 BPM | RESPIRATION RATE: 14 BRPM

## 2019-02-13 VITALS — HEART RATE: 70 BPM | DIASTOLIC BLOOD PRESSURE: 62 MMHG | RESPIRATION RATE: 14 BRPM | SYSTOLIC BLOOD PRESSURE: 94 MMHG

## 2019-02-13 VITALS — DIASTOLIC BLOOD PRESSURE: 75 MMHG | SYSTOLIC BLOOD PRESSURE: 107 MMHG | RESPIRATION RATE: 14 BRPM | HEART RATE: 71 BPM

## 2019-02-13 VITALS — SYSTOLIC BLOOD PRESSURE: 95 MMHG | DIASTOLIC BLOOD PRESSURE: 68 MMHG | RESPIRATION RATE: 13 BRPM | HEART RATE: 75 BPM

## 2019-02-13 VITALS — SYSTOLIC BLOOD PRESSURE: 110 MMHG | DIASTOLIC BLOOD PRESSURE: 77 MMHG | HEART RATE: 77 BPM | RESPIRATION RATE: 16 BRPM

## 2019-02-13 VITALS — SYSTOLIC BLOOD PRESSURE: 97 MMHG | DIASTOLIC BLOOD PRESSURE: 69 MMHG | HEART RATE: 70 BPM | RESPIRATION RATE: 14 BRPM

## 2019-02-13 VITALS — DIASTOLIC BLOOD PRESSURE: 63 MMHG | RESPIRATION RATE: 13 BRPM | HEART RATE: 67 BPM | SYSTOLIC BLOOD PRESSURE: 94 MMHG

## 2019-02-13 VITALS — DIASTOLIC BLOOD PRESSURE: 70 MMHG | HEART RATE: 67 BPM | SYSTOLIC BLOOD PRESSURE: 103 MMHG | RESPIRATION RATE: 14 BRPM

## 2019-02-13 VITALS — SYSTOLIC BLOOD PRESSURE: 100 MMHG | DIASTOLIC BLOOD PRESSURE: 70 MMHG | RESPIRATION RATE: 16 BRPM | HEART RATE: 71 BPM

## 2019-02-13 VITALS — HEART RATE: 76 BPM | SYSTOLIC BLOOD PRESSURE: 107 MMHG | RESPIRATION RATE: 16 BRPM | DIASTOLIC BLOOD PRESSURE: 70 MMHG

## 2019-02-13 VITALS — HEART RATE: 71 BPM | RESPIRATION RATE: 14 BRPM | DIASTOLIC BLOOD PRESSURE: 71 MMHG | SYSTOLIC BLOOD PRESSURE: 102 MMHG

## 2019-02-13 VITALS — DIASTOLIC BLOOD PRESSURE: 69 MMHG | RESPIRATION RATE: 14 BRPM | HEART RATE: 72 BPM | SYSTOLIC BLOOD PRESSURE: 102 MMHG

## 2019-02-13 VITALS — HEART RATE: 73 BPM | DIASTOLIC BLOOD PRESSURE: 75 MMHG | RESPIRATION RATE: 14 BRPM | SYSTOLIC BLOOD PRESSURE: 112 MMHG

## 2019-02-13 VITALS — RESPIRATION RATE: 14 BRPM | DIASTOLIC BLOOD PRESSURE: 66 MMHG | HEART RATE: 76 BPM | SYSTOLIC BLOOD PRESSURE: 103 MMHG

## 2019-02-13 VITALS — RESPIRATION RATE: 14 BRPM | SYSTOLIC BLOOD PRESSURE: 102 MMHG | DIASTOLIC BLOOD PRESSURE: 75 MMHG | HEART RATE: 74 BPM

## 2019-02-13 VITALS — SYSTOLIC BLOOD PRESSURE: 101 MMHG | RESPIRATION RATE: 14 BRPM | HEART RATE: 68 BPM | DIASTOLIC BLOOD PRESSURE: 68 MMHG

## 2019-02-13 VITALS — HEART RATE: 69 BPM | DIASTOLIC BLOOD PRESSURE: 72 MMHG | SYSTOLIC BLOOD PRESSURE: 98 MMHG | RESPIRATION RATE: 14 BRPM

## 2019-02-13 VITALS — RESPIRATION RATE: 14 BRPM | DIASTOLIC BLOOD PRESSURE: 74 MMHG | HEART RATE: 68 BPM | SYSTOLIC BLOOD PRESSURE: 99 MMHG

## 2019-02-13 VITALS — RESPIRATION RATE: 16 BRPM | SYSTOLIC BLOOD PRESSURE: 99 MMHG | DIASTOLIC BLOOD PRESSURE: 67 MMHG | HEART RATE: 71 BPM

## 2019-02-13 VITALS — HEART RATE: 72 BPM | DIASTOLIC BLOOD PRESSURE: 70 MMHG | SYSTOLIC BLOOD PRESSURE: 98 MMHG | RESPIRATION RATE: 14 BRPM

## 2019-02-13 VITALS — SYSTOLIC BLOOD PRESSURE: 102 MMHG | RESPIRATION RATE: 14 BRPM | HEART RATE: 72 BPM | DIASTOLIC BLOOD PRESSURE: 68 MMHG

## 2019-02-13 VITALS — HEART RATE: 70 BPM

## 2019-02-13 VITALS — RESPIRATION RATE: 15 BRPM | DIASTOLIC BLOOD PRESSURE: 68 MMHG | SYSTOLIC BLOOD PRESSURE: 103 MMHG | HEART RATE: 70 BPM

## 2019-02-13 VITALS — DIASTOLIC BLOOD PRESSURE: 69 MMHG | SYSTOLIC BLOOD PRESSURE: 99 MMHG | RESPIRATION RATE: 14 BRPM | HEART RATE: 71 BPM

## 2019-02-13 VITALS — HEART RATE: 70 BPM | DIASTOLIC BLOOD PRESSURE: 61 MMHG | RESPIRATION RATE: 14 BRPM | SYSTOLIC BLOOD PRESSURE: 99 MMHG

## 2019-02-13 VITALS — DIASTOLIC BLOOD PRESSURE: 60 MMHG | SYSTOLIC BLOOD PRESSURE: 101 MMHG | RESPIRATION RATE: 14 BRPM | HEART RATE: 73 BPM

## 2019-02-13 VITALS — SYSTOLIC BLOOD PRESSURE: 98 MMHG | RESPIRATION RATE: 16 BRPM | HEART RATE: 71 BPM | DIASTOLIC BLOOD PRESSURE: 65 MMHG

## 2019-02-13 VITALS — DIASTOLIC BLOOD PRESSURE: 70 MMHG | RESPIRATION RATE: 14 BRPM | SYSTOLIC BLOOD PRESSURE: 101 MMHG | HEART RATE: 71 BPM

## 2019-02-13 VITALS — RESPIRATION RATE: 14 BRPM

## 2019-02-13 VITALS — RESPIRATION RATE: 16 BRPM | DIASTOLIC BLOOD PRESSURE: 63 MMHG | SYSTOLIC BLOOD PRESSURE: 93 MMHG | HEART RATE: 70 BPM

## 2019-02-13 VITALS — DIASTOLIC BLOOD PRESSURE: 75 MMHG | SYSTOLIC BLOOD PRESSURE: 107 MMHG | RESPIRATION RATE: 16 BRPM | HEART RATE: 75 BPM

## 2019-02-13 VITALS — RESPIRATION RATE: 14 BRPM | DIASTOLIC BLOOD PRESSURE: 65 MMHG | HEART RATE: 69 BPM | SYSTOLIC BLOOD PRESSURE: 98 MMHG

## 2019-02-13 VITALS — HEART RATE: 75 BPM | RESPIRATION RATE: 15 BRPM | SYSTOLIC BLOOD PRESSURE: 105 MMHG | DIASTOLIC BLOOD PRESSURE: 66 MMHG

## 2019-02-13 VITALS — HEART RATE: 74 BPM | DIASTOLIC BLOOD PRESSURE: 66 MMHG | SYSTOLIC BLOOD PRESSURE: 96 MMHG | RESPIRATION RATE: 17 BRPM

## 2019-02-13 VITALS — SYSTOLIC BLOOD PRESSURE: 100 MMHG | RESPIRATION RATE: 15 BRPM | HEART RATE: 70 BPM | DIASTOLIC BLOOD PRESSURE: 66 MMHG

## 2019-02-13 VITALS — SYSTOLIC BLOOD PRESSURE: 97 MMHG | RESPIRATION RATE: 14 BRPM | DIASTOLIC BLOOD PRESSURE: 65 MMHG | HEART RATE: 68 BPM

## 2019-02-13 VITALS — DIASTOLIC BLOOD PRESSURE: 66 MMHG | RESPIRATION RATE: 14 BRPM | SYSTOLIC BLOOD PRESSURE: 106 MMHG | HEART RATE: 69 BPM

## 2019-02-13 VITALS — RESPIRATION RATE: 16 BRPM | DIASTOLIC BLOOD PRESSURE: 74 MMHG | SYSTOLIC BLOOD PRESSURE: 106 MMHG | HEART RATE: 73 BPM

## 2019-02-13 VITALS — RESPIRATION RATE: 16 BRPM | DIASTOLIC BLOOD PRESSURE: 60 MMHG | HEART RATE: 71 BPM | SYSTOLIC BLOOD PRESSURE: 93 MMHG

## 2019-02-13 VITALS — RESPIRATION RATE: 20 BRPM | SYSTOLIC BLOOD PRESSURE: 103 MMHG | HEART RATE: 69 BPM | DIASTOLIC BLOOD PRESSURE: 68 MMHG

## 2019-02-13 VITALS — RESPIRATION RATE: 16 BRPM | HEART RATE: 71 BPM | SYSTOLIC BLOOD PRESSURE: 101 MMHG | DIASTOLIC BLOOD PRESSURE: 71 MMHG

## 2019-02-13 VITALS — DIASTOLIC BLOOD PRESSURE: 72 MMHG | RESPIRATION RATE: 14 BRPM | SYSTOLIC BLOOD PRESSURE: 103 MMHG | HEART RATE: 71 BPM

## 2019-02-13 VITALS — RESPIRATION RATE: 15 BRPM

## 2019-02-13 VITALS — HEART RATE: 72 BPM | SYSTOLIC BLOOD PRESSURE: 102 MMHG | RESPIRATION RATE: 14 BRPM | DIASTOLIC BLOOD PRESSURE: 69 MMHG

## 2019-02-13 VITALS — RESPIRATION RATE: 14 BRPM | SYSTOLIC BLOOD PRESSURE: 101 MMHG | HEART RATE: 70 BPM | DIASTOLIC BLOOD PRESSURE: 70 MMHG

## 2019-02-13 VITALS — HEART RATE: 71 BPM | SYSTOLIC BLOOD PRESSURE: 101 MMHG | DIASTOLIC BLOOD PRESSURE: 65 MMHG | RESPIRATION RATE: 14 BRPM

## 2019-02-13 VITALS — RESPIRATION RATE: 16 BRPM

## 2019-02-13 VITALS — SYSTOLIC BLOOD PRESSURE: 102 MMHG | DIASTOLIC BLOOD PRESSURE: 70 MMHG | RESPIRATION RATE: 14 BRPM | HEART RATE: 71 BPM

## 2019-02-13 VITALS — HEART RATE: 73 BPM | RESPIRATION RATE: 14 BRPM | SYSTOLIC BLOOD PRESSURE: 108 MMHG | DIASTOLIC BLOOD PRESSURE: 76 MMHG

## 2019-02-13 VITALS — SYSTOLIC BLOOD PRESSURE: 96 MMHG | RESPIRATION RATE: 16 BRPM | HEART RATE: 71 BPM | DIASTOLIC BLOOD PRESSURE: 70 MMHG

## 2019-02-13 VITALS — RESPIRATION RATE: 17 BRPM

## 2019-02-13 VITALS — SYSTOLIC BLOOD PRESSURE: 102 MMHG | RESPIRATION RATE: 16 BRPM | DIASTOLIC BLOOD PRESSURE: 68 MMHG | HEART RATE: 70 BPM

## 2019-02-13 VITALS — SYSTOLIC BLOOD PRESSURE: 98 MMHG | HEART RATE: 68 BPM | DIASTOLIC BLOOD PRESSURE: 69 MMHG | RESPIRATION RATE: 19 BRPM

## 2019-02-13 VITALS — RESPIRATION RATE: 18 BRPM | HEART RATE: 74 BPM | DIASTOLIC BLOOD PRESSURE: 70 MMHG | SYSTOLIC BLOOD PRESSURE: 102 MMHG

## 2019-02-13 VITALS — HEART RATE: 155 BPM

## 2019-02-13 VITALS — SYSTOLIC BLOOD PRESSURE: 86 MMHG | RESPIRATION RATE: 12 BRPM | HEART RATE: 66 BPM | DIASTOLIC BLOOD PRESSURE: 69 MMHG

## 2019-02-13 LAB
ADD MAN DIFF?: NO
ALLEN TEST: (no result)
ANION GAP: 5 (ref 5–13)
ANISOCYTOSIS: (no result) (ref 0–0)
ARTERIAL BASE EXCESS: 5.1 MMOL/L (ref -3–3)
ARTERIAL BLOOD GAS OXYGEN SAT: 96.6 MMHG (ref 95–98)
ARTERIAL COHB: 0.3 % (ref 0–3)
ARTERIAL FRACTION OF OXYHGB: 96.1 % (ref 93–99)
ARTERIAL HCO3: 28.2 MMOL/L (ref 22–26)
ARTERIAL METHB: 0.2 % (ref 0–1.5)
ARTERIAL PCO2: 35.6 MMHG (ref 35–45)
BAND NEUTROPHILS #M: 0.3 10^3/UL (ref 0–0.6)
BAND NEUTROPHILS % (M): 7 % (ref 0–4)
BLOOD GAS LOW PEEP SETTING: 5 CMH2O
BLOOD GAS TIDAL VOLUME: 400 ML
BLOOD UREA NITROGEN: 11 MG/DL (ref 7–20)
CALCIUM: 8 MG/DL (ref 8.4–10.2)
CARBON DIOXIDE: 29 MMOL/L (ref 21–31)
CHLORIDE: 96 MMOL/L (ref 97–110)
CREATININE: 0.34 MG/DL (ref 0.44–1)
EOSINOPHILS % (M): 3 % (ref 0–7)
FIO2: 30 %
GLUCOSE: 110 MG/DL (ref 70–220)
HEMATOCRIT: 25.8 % (ref 37–47)
HEMOGLOBIN: 8.6 G/DL (ref 12–16)
IMMATURE GRANS #M: 0.07 10^3/UL (ref 0–0.03)
IMMATURE GRANS % (M): 1.2 % (ref 0–0.43)
LYMPHOCYTES #M: 0.9 10^3/UL (ref 0.8–2.9)
LYMPHOCYTES % (M): 16 % (ref 15–51)
MEAN CORPUSCULAR HEMOGLOBIN: 31.2 PG (ref 29–33)
MEAN CORPUSCULAR HGB CONC: 33.3 G/DL (ref 32–37)
MEAN CORPUSCULAR VOLUME: 93.5 FL (ref 82–101)
MEAN PLATELET VOLUME: 11.3 FL (ref 7.4–10.4)
MODE: (no result)
MONOCYTE #M: 0.2 10^3/UL (ref 0.3–0.9)
MONOCYTES % (M): 5 % (ref 0–11)
NUCLEATED RED BLOOD CELLS%: 0.4 /100WBC (ref 0–0)
O2 A-A PPRESDIFF RESPIRATORY: 83.5 MMHG (ref 7–24)
PLATELET COUNT: 189 10^3/UL (ref 140–415)
PLATELET ESTIMATE: NORMAL
POLYCHROMASIA: (no result) (ref 0–0)
POSITIVE DIFF: (no result)
POTASSIUM: 4.3 MMOL/L (ref 3.5–5.1)
REACTIVE LYMPHOCYTES #M: 0.1 10^3/UL (ref 0–0)
REACTIVE LYMPHOCYTES% (M): 3 % (ref 0–0)
RED BLOOD COUNT: 2.76 10^6/UL (ref 4.2–5.4)
RED CELL DISTRIBUTION WIDTH: 12.9 % (ref 11.5–14.5)
SEG NEUT #M: 3.8 10^3/UL (ref 1.6–7.5)
SEGMENTED NEUTROPHILS (M) %: 66 % (ref 39–77)
SMUDGE%M: 14 % (ref 0–0)
SODIUM: 130 MMOL/L (ref 135–144)
WHITE BLOOD COUNT: 5.7 10^3/UL (ref 4.8–10.8)

## 2019-02-13 RX ADMIN — PROPOFOL 1 MLS/HR: 10 INJECTION, EMULSION INTRAVENOUS at 21:00

## 2019-02-13 RX ADMIN — HEPARIN SODIUM 1 UNIT: 5000 INJECTION, SOLUTION INTRAVENOUS; SUBCUTANEOUS at 21:00

## 2019-02-13 RX ADMIN — LANSOPRAZOLE SCH MG: 30 CAPSULE, DELAYED RELEASE PELLETS ORAL at 05:56

## 2019-02-13 RX ADMIN — LEVETIRACETAM SCH MLS/HR: 100 INJECTION, SOLUTION INTRAVENOUS at 09:43

## 2019-02-13 RX ADMIN — HEPARIN SODIUM SCH UNIT: 5000 INJECTION, SOLUTION INTRAVENOUS; SUBCUTANEOUS at 21:00

## 2019-02-13 RX ADMIN — PROPOFOL 1 MLS/HR: 10 INJECTION, EMULSION INTRAVENOUS at 09:00

## 2019-02-13 RX ADMIN — CASTOR OIL AND BALSAM, PERU 1 APPLIC: 788; 87 OINTMENT TOPICAL at 08:37

## 2019-02-13 RX ADMIN — COLLAGENASE SANTYL 1 APPLIC: 250 OINTMENT TOPICAL at 08:36

## 2019-02-13 RX ADMIN — HEPARIN SODIUM SCH UNIT: 5000 INJECTION, SOLUTION INTRAVENOUS; SUBCUTANEOUS at 08:45

## 2019-02-13 RX ADMIN — LEVETIRACETAM SCH MLS/HR: 100 INJECTION, SOLUTION INTRAVENOUS at 20:59

## 2019-02-13 RX ADMIN — COLLAGENASE SANTYL SCH APPLIC: 250 OINTMENT TOPICAL at 08:36

## 2019-02-13 RX ADMIN — PROPOFOL SCH MLS/HR: 10 INJECTION, EMULSION INTRAVENOUS at 09:00

## 2019-02-13 RX ADMIN — CASTOR OIL AND BALSAM, PERU 1 APPLIC: 788; 87 OINTMENT TOPICAL at 21:03

## 2019-02-13 RX ADMIN — CEFEPIME SCH MLS/HR: 1 INJECTION, POWDER, FOR SOLUTION INTRAMUSCULAR; INTRAVENOUS at 20:59

## 2019-02-13 RX ADMIN — CEFEPIME 1 MLS/HR: 1 INJECTION, POWDER, FOR SOLUTION INTRAMUSCULAR; INTRAVENOUS at 08:36

## 2019-02-13 RX ADMIN — LEVETIRACETAM 1 MLS/HR: 100 INJECTION, SOLUTION INTRAVENOUS at 09:43

## 2019-02-13 RX ADMIN — FUROSEMIDE 1 MG: 10 INJECTION, SOLUTION INTRAVENOUS at 11:23

## 2019-02-13 RX ADMIN — PROPOFOL SCH MLS/HR: 10 INJECTION, EMULSION INTRAVENOUS at 21:00

## 2019-02-13 RX ADMIN — CEFEPIME SCH MLS/HR: 1 INJECTION, POWDER, FOR SOLUTION INTRAMUSCULAR; INTRAVENOUS at 08:36

## 2019-02-13 RX ADMIN — LEVETIRACETAM 1 MLS/HR: 100 INJECTION, SOLUTION INTRAVENOUS at 20:59

## 2019-02-13 RX ADMIN — HEPARIN SODIUM 1 UNIT: 5000 INJECTION, SOLUTION INTRAVENOUS; SUBCUTANEOUS at 08:45

## 2019-02-13 RX ADMIN — MAGNESIUM SULFATE HEPTAHYDRATE 1 MLS/HR: 1 INJECTION, SOLUTION INTRAVENOUS at 17:04

## 2019-02-13 RX ADMIN — LANSOPRAZOLE 1 MG: 30 CAPSULE, DELAYED RELEASE PELLETS ORAL at 05:56

## 2019-02-13 RX ADMIN — CEFEPIME 1 MLS/HR: 1 INJECTION, POWDER, FOR SOLUTION INTRAMUSCULAR; INTRAVENOUS at 20:59

## 2019-02-13 NOTE — CONS
Assessment/Plan


Assessment/Plan


Hospital Course (Demo Recall)


Off pressors looks comfortable on vent no fevers overnight WBC 5.7 platelets 189


BUN 11 creatinine 0.34 chest x-ray this morning revealed increased bilateral 


infiltrates





Antimicrobials: Cefepime





Microbiology: Urine culture grew Streptococcus, sputum culture growing 


pseudomonas aeruginosa





Indwelling: Endotracheal tube, orogastric tube Davila catheter left upper 


extremity PICC line





Physical examination: Well-developed chronically ill-appearing middle-aged woman


who is intubated in no distress.  Head atraumatic normocephalic.  Sclera 


nonicteric.  Neck is supple.  Chest rise symmetrical, breath sounds diminished 


bases.  Heart: S1-S2.  Abdomen soft bowel sounds present, hypoactive.  Extr


emities cyanotic with trace edema





Assessment:


1.  Sepsis 


2.  Gram-positive cocci bacteremia cw contaminant


3.  Respiratory failure/PNA


4.  New onset seizures


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


5.  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


6.  Status post urinary tract infection





Plan: Remains unchanged, stable off pressors continue present care, antibiotics,


neurology/pulmonary recommendations ongoing discussion with family regarding 


plan of care





Consultation Date/Type/Reason


Admit Date/Time


Feb 3, 2019 at 22:12


Initial Consult Date


2/5/19


Type of Consult


id


Requesting Provider:  LAURENCE CASTELLANO


Date/Time of Note


DATE: 2/13/19 


TIME: 11:36





Exam/Review of Systems


Exam


Vitals





Vital Signs


  Date      Temp  Pulse  Resp  B/P (MAP)   Pulse Ox  O2          O2 Flow    FiO2


Time                                                 Delivery    Rate


   2/13/19           73    14      101/60       100


     09:30                           (74)


   2/13/19                                           Mechanical


     09:00                                           Ventilator


   2/13/19                                                                    30


     08:00


   2/13/19  98.7


     07:00








Intake and Output





2/12/19 2/12/19 2/13/19





1515:00


23:00


07:00





IntakeIntake Total


717.9 ml


732.9 ml


690 ml





OutputOutput Total


390 ml


360 ml


400 ml





BalanceBalance


327.9 ml


372.9 ml


290 ml














Results


Result Diagram:  


2/13/19 0447                                                                    


           2/13/19 0447





Results 24hrs





Laboratory Tests


    Test
                                2/13/19
04:47         2/13/19
07:00


    White Blood Count                           5.7  #


    Red Blood Count                            2.76  L


    Hemoglobin                                  8.6  L


    Hematocrit                                 25.8  L


    Mean Corpuscular Volume                     93.5


    Mean Corpuscular Hemoglobin                 31.2


    Mean Corpuscular Hemoglobin
Concent        33.3  
  



    Red Cell Distribution Width                 12.9


    Platelet Count                               189


    Mean Platelet Volume                       11.3  H


    Immature Granulocytes %                   1.200  H


    Neutrophils %


    Segmented Neutrophils %
(Manual)             66  
  



    Band Neutrophils % (Manual)                   7  H


    Lymphocytes %


    Lymphocytes % (Manual)                        16


    Reactive Lymphocytes %
(Manual)              3  H
  



    Monocytes %


    Monocytes % (Manual)                           5


    Eosinophils %


    Eosinophils % (Manual)                         3


    Basophils %


    Nucleated Red Blood Cells %                 0.4  H


    Immature Granulocytes #                   0.070  H


    Neutrophils #


    Neutrophils # (Manual)                       3.8


    Band Neutrophils #                           0.3


    Lymphocytes (Manual)                         0.9


    Lymphocytes #


    Reactive Lymphocytes #                      0.1  H


    Monocytes #


    Monocytes # (Manual)                        0.2  L


    Eosinophils #


    Basophils #


    Nucleated Red Blood Cells #


    Platelet Estimate                    NORMAL


    Polychromasia                                 1+


    Anisocytosis                                  1+


    Sodium Level                                130  L


    Potassium Level                              4.3


    Chloride Level                               96  L


    Carbon Dioxide Level                          29


    Anion Gap                                      5


    Blood Urea Nitrogen                           11


    Creatinine                                 0.34  L


    Est Glomerular Filtrat Rate
mL/min   > 60  
        



    Glucose Level                                110


    Calcium Level                               8.0  L


    Blood Gas Specimen Source
           
              Blood arterial



    Arterial Blood Date Drawn
           
              2/13/2019
7:28:03 AM


    Arterial Blood pH (Temp
corrected)   
                         7.517  H



    Arterial Blood pCO2 (Temp
correct)   
                           35.6  



    Arterial Blood pO2 (Temp
corrected)  
                           88.6  



    Arterial Blood HCO3                                              28.2  H


    Arterial Blood Base Excess                                        5.1  H


    Arterial Blood Oxygen
Saturation     
                           96.6  



    Praveen Test                                          ACCEPTAB


    Arterial Blood Gas Puncture
Site     
              Right Radial  



    Arterial Blood
Carboxyhemoglobin     
                            0.3  



    Arterial Blood Methemoglobin                                       0.2


    Blood Gas A-a O2 Differential                                    83.5  H


    Oxyhemoglobin Percent                                             96.1


    Blood Gas Temperature                                             37.0


    Blood Gas Respiration Rate                                        14.0


    Blood Gas Actual Respiration
Rate    
                             14  



    Blood Gas Modality                                  VENT - AC


    FiO2                                                              30.0


    Blood Gas Tidal Volume                                           400.0


    Blood Gas Low PEEP Setting                                         5.0


    Blood Gas Notified Whom                             TM


    Blood Gas Notified Time
             
              2/13/2019
7:59:36 AM








Medications


Medication





Current Medications


IV Flush (NS 3 ml) 3 ml PER PROTOCOL IV ;  Start 2/4/19 at 03:30


Ondansetron HCl (Zofran Inj) 4 mg Q6H  PRN IV NAUSEA/VOMITING;  Start 2/4/19 at 


03:30


Acetaminophen (Tylenol Tab) 650 mg Q6H  PRN PO .PAIN 1-3 OR TEMP;  Start 2/4/19 


at 03:30


Docusate Sodium (Colace) 100 mg Q12H  PRN PO .CONSTIPATION;  Start 2/4/19 at 


03:30


Magnesium Hydroxide (Milk Of Mag) 30 ml DAILY  PRN PO .CONSTIPATION;  Start 


2/4/19 at 03:30


Heparin Sodium (Porcine) (Heparin  (5000 Units/1ml)) 5,000 unit Q12 SC  Last 


administered on 2/13/19at 08:45; Admin Dose 5,000 UNIT;  Start 2/4/19 at 09:00


Albuterol/ Ipratropium (Duoneb) 3 ml Q4H RESP THERAPY  PRN HHN SHORTNESS OF 


BREATH;  Start 2/4/19 at 03:30


Hydralazine HCl (Apresoline) 10 mg Q6H  PRN IV ELEVATED BLOOD PRESSURE;  Start 


2/4/19 at 03:30


Nitroglycerin (Nitroglycerin (Sl Tab) 0.4 Mg) 1 tab Q5M  PRN SL ANGINA;  Start 


2/4/19 at 03:30


Lorazepam (Ativan) 1 mg Q2H  PRN IV SEIZURES Last administered on 2/4/19at 


03:57; Admin Dose 1 MG;  Start 2/4/19 at 03:30


Miscellaneous Information (Pending Santyl Order For Wound Care) This patient 


ha... PRN  PRN XX WOUND CARE;  Start 2/4/19 at 05:00


Propofol 100 ml @  1.227 mls/ hr Q12H IV ;  Start 2/5/19 at 21:00


Collagenase (Santyl) 1 applic DAILY TOP  Last administered on 2/13/19at 08:36; 


Admin Dose 1 APPLIC;  Start 2/6/19 at 11:30


IV Flush (NS 10 ml) 10 ml PRN  PRN IV IV PROTOCOL;  Start 2/6/19 at 15:00


Cefepime HCl 50 ml @  100 mls/hr Q12 IVPB  Last administered on 2/13/19at 08:36;


Admin Dose 100 MLS/HR;  Start 2/8/19 at 13:00


Levetiracetam 250 mg/Dextrose 102.5 ml @  430 mls/hr Q12 IVPB  Last administered


on 2/13/19at 09:43; Admin Dose 430 MLS/HR;  Start 2/11/19 at 21:00


Lansoprazole (Prevacid) 30 mg DAILY@06 PO  Last administered on 2/13/19at 05:56;


Admin Dose 30 MG;  Start 2/12/19 at 06:00


Norepinephrine 250 ml @  1.875 mls/ hr TITRATE IV ;  Start 2/13/19 at 10:30











RUKHSANA ZEPEDA NP            Feb 13, 2019 11:37

## 2019-02-13 NOTE — CONS
Consult Date/Type/Reason


Admit Date/Time


Feb 3, 2019 at 22:12


Initial Consult Date


2/5/19


Type of Consult


Pulmonary


Requesting Provider:  LAURENCE CASTELLANO


Date/Time of Note


DATE: 2/13/19 


TIME: 11:17





Subjective


Patient continues mechanical ventilation remains largely somnolent.  Currently 


off vasopressors.  Tube feeding as tolerated.





Objective





Vital Signs


  Date      Temp  Pulse  Resp  B/P (MAP)   Pulse Ox  O2          O2 Flow    FiO2


Time                                                 Delivery    Rate


   2/13/19           73    14      101/60       100


     09:30                           (74)


   2/13/19                                           Mechanical


     09:00                                           Ventilator


   2/13/19                                                                    30


     08:00


   2/13/19  98.7


     07:00








Intake and Output





2/12/19 2/12/19 2/13/19





1515:00


23:00


07:00





IntakeIntake Total


717.9 ml


732.9 ml


690 ml





OutputOutput Total


390 ml


360 ml


400 ml





BalanceBalance


327.9 ml


372.9 ml


290 ml











Exam


GENERAL: Chronically ill-appearing lady on mechanical ventilation


VITAL SIGNS:  per chart


NECK:  Supple.  No JVD or lymphadenopathy.


CARDIAC EXAM:  S1, S2. No added sounds or murmurs.


CHEST:  clear bilaterally, No added sounds, rales or wheezes


ABDOMEN:  Soft, nontender.  No guarding or rebound.


EXTREMITIES:  No cyanosis, clubbing or edema.


NEUROLOGIC:  Generalized weakness.  Unable to assess





Vent Setting


Ventilator Support Mode:  AC


Fraction of Inspired Oxygen pe:  30


Positive End Expiratory Pressu:  5.0





Results/Medications


Result Diagram:  


2/13/19 0447                                                                    


           2/13/19 0447





Results 24 hrs





Laboratory Tests


    Test
                                2/13/19
04:47         2/13/19
07:00


    White Blood Count                           5.7  #


    Red Blood Count                            2.76  L


    Hemoglobin                                  8.6  L


    Hematocrit                                 25.8  L


    Mean Corpuscular Volume                     93.5


    Mean Corpuscular Hemoglobin                 31.2


    Mean Corpuscular Hemoglobin
Concent        33.3  
  



    Red Cell Distribution Width                 12.9


    Platelet Count                               189


    Mean Platelet Volume                       11.3  H


    Immature Granulocytes %                   1.200  H


    Neutrophils %


    Segmented Neutrophils %
(Manual)             66  
  



    Band Neutrophils % (Manual)                   7  H


    Lymphocytes %


    Lymphocytes % (Manual)                        16


    Reactive Lymphocytes %
(Manual)              3  H
  



    Monocytes %


    Monocytes % (Manual)                           5


    Eosinophils %


    Eosinophils % (Manual)                         3


    Basophils %


    Nucleated Red Blood Cells %                 0.4  H


    Immature Granulocytes #                   0.070  H


    Neutrophils #


    Neutrophils # (Manual)                       3.8


    Band Neutrophils #                           0.3


    Lymphocytes (Manual)                         0.9


    Lymphocytes #


    Reactive Lymphocytes #                      0.1  H


    Monocytes #


    Monocytes # (Manual)                        0.2  L


    Eosinophils #


    Basophils #


    Nucleated Red Blood Cells #


    Platelet Estimate                    NORMAL


    Polychromasia                                 1+


    Anisocytosis                                  1+


    Sodium Level                                130  L


    Potassium Level                              4.3


    Chloride Level                               96  L


    Carbon Dioxide Level                          29


    Anion Gap                                      5


    Blood Urea Nitrogen                           11


    Creatinine                                 0.34  L


    Est Glomerular Filtrat Rate
mL/min   > 60  
        



    Glucose Level                                110


    Calcium Level                               8.0  L


    Blood Gas Specimen Source
           
              Blood arterial



    Arterial Blood Date Drawn
           
              2/13/2019
7:28:03 AM


    Arterial Blood pH (Temp
corrected)   
                         7.517  H



    Arterial Blood pCO2 (Temp
correct)   
                           35.6  



    Arterial Blood pO2 (Temp
corrected)  
                           88.6  



    Arterial Blood HCO3                                              28.2  H


    Arterial Blood Base Excess                                        5.1  H


    Arterial Blood Oxygen
Saturation     
                           96.6  



    Praveen Test                                          ACCEPTAB


    Arterial Blood Gas Puncture
Site     
              Right Radial  



    Arterial Blood
Carboxyhemoglobin     
                            0.3  



    Arterial Blood Methemoglobin                                       0.2


    Blood Gas A-a O2 Differential                                    83.5  H


    Oxyhemoglobin Percent                                             96.1


    Blood Gas Temperature                                             37.0


    Blood Gas Respiration Rate                                        14.0


    Blood Gas Actual Respiration
Rate    
                             14  



    Blood Gas Modality                                  VENT - AC


    FiO2                                                              30.0


    Blood Gas Tidal Volume                                           400.0


    Blood Gas Low PEEP Setting                                         5.0


    Blood Gas Notified Whom                             TM


    Blood Gas Notified Time
             
              2/13/2019
7:59:36 AM





Medications





Current Medications


IV Flush (NS 3 ml) 3 ml PER PROTOCOL IV ;  Start 2/4/19 at 03:30


Ondansetron HCl (Zofran Inj) 4 mg Q6H  PRN IV NAUSEA/VOMITING;  Start 2/4/19 at 


03:30


Acetaminophen (Tylenol Tab) 650 mg Q6H  PRN PO .PAIN 1-3 OR TEMP;  Start 2/4/19 


at 03:30


Docusate Sodium (Colace) 100 mg Q12H  PRN PO .CONSTIPATION;  Start 2/4/19 at 


03:30


Magnesium Hydroxide (Milk Of Mag) 30 ml DAILY  PRN PO .CONSTIPATION;  Start 


2/4/19 at 03:30


Heparin Sodium (Porcine) (Heparin  (5000 Units/1ml)) 5,000 unit Q12 SC  Last adm


inistered on 2/13/19at 08:45; Admin Dose 5,000 UNIT;  Start 2/4/19 at 09:00


Albuterol/ Ipratropium (Duoneb) 3 ml Q4H RESP THERAPY  PRN HHN SHORTNESS OF 


BREATH;  Start 2/4/19 at 03:30


Hydralazine HCl (Apresoline) 10 mg Q6H  PRN IV ELEVATED BLOOD PRESSURE;  Start 


2/4/19 at 03:30


Nitroglycerin (Nitroglycerin (Sl Tab) 0.4 Mg) 1 tab Q5M  PRN SL ANGINA;  Start 


2/4/19 at 03:30


Lorazepam (Ativan) 1 mg Q2H  PRN IV SEIZURES Last administered on 2/4/19at 


03:57; Admin Dose 1 MG;  Start 2/4/19 at 03:30


Miscellaneous Information (Pending Santyl Order For Wound Care) This patient 


ha... PRN  PRN XX WOUND CARE;  Start 2/4/19 at 05:00


Propofol 100 ml @  1.227 mls/ hr Q12H IV ;  Start 2/5/19 at 21:00


Collagenase (Santyl) 1 applic DAILY TOP  Last administered on 2/13/19at 08:36; 


Admin Dose 1 APPLIC;  Start 2/6/19 at 11:30


IV Flush (NS 10 ml) 10 ml PRN  PRN IV IV PROTOCOL;  Start 2/6/19 at 15:00


Cefepime HCl 50 ml @  100 mls/hr Q12 IVPB  Last administered on 2/13/19at 08:36;


Admin Dose 100 MLS/HR;  Start 2/8/19 at 13:00


Levetiracetam 250 mg/Dextrose 102.5 ml @  430 mls/hr Q12 IVPB  Last administered


on 2/13/19at 09:43; Admin Dose 430 MLS/HR;  Start 2/11/19 at 21:00


Lansoprazole (Prevacid) 30 mg DAILY@06 PO  Last administered on 2/13/19at 05:56;


Admin Dose 30 MG;  Start 2/12/19 at 06:00


Norepinephrine 250 ml @  1.875 mls/ hr TITRATE IV ;  Start 2/13/19 at 10:30





Assessment/Plan


Hospital Course (Demo Recall)


IMP:


1.  Encephalopathy--post-ictal state s/p status epilepticus.  Possibly secondary


to elevated phenobarbital level.


2.  Vent Dependence 2/2 #1


3.  Metabolic acidosis likely secondary to hypoperfusion.  Now improved


4.  Status post septic Shock


5.  s/p lactic acidosis 


 


RECS: 


1.  Titrate levophed to MAP > 65 mm Hg


2.  Intravenous antibiotics; de-escalate pending cultures


3.  Continue antiepileptics per Neuro 


4.  Minimize sedation; continue to follow neuro exam


5.  DVT and GI prophylaxis 





Family meeting Friday morning.  To discuss goals of care.  Family leaning 


towards terminal extubation.











RIC CAROLINA MD, [facility]     Feb 13, 2019 11:20

## 2019-02-13 NOTE — CONS
Assessment/Plan


Assessment/Plan


Assessment/Plan (Daily)


Respiratory failure


Intubated


Urinary tract infection


Broad-spectrum IV antibiotic coverage given


With electrolyte abnormalities


Corrected


Altered mental status recent history of precipitous decline in cognitive 


abilities


Negative workup per spouse


I would be meeting with family members on Friday this week February 15 if family


decides on terminal extubation I will assist with end-of-life care.





Consultation Date/Type/Reason


Admit Date/Time


Feb 3, 2019 at 22:12


Date/Time of Note


DATE: 2/13/19 


TIME: 11:57





Hx of Present Illness


Is a palliative care consultation on this very unfortunate 58-year-old female.  


She has a long and a confusing past medical history.  But essentially was 


brought to [facility] when patient presumably began to have 


status seizure activity..  She apparently has been worked up in the past for an 


unknown neurological disorder that have led to progressively decrease in her 


cognitive abilities beginning approximately 1 year ago she has been seen in the 


intensive care unit seizures are under control at this time but however patient 


is intubated.  History is taken from patient's  at the bedside.  Fluid 


and electrolyte abnormalities have been corrected while she is in the intensive 


care unit, she is currently on Keppra 500 mg IV and is being seen by neurology 


consultation.  By her or her 's story approximately 1 year ago she began 


to have signs of cognitive impairment, specifically slurring of her words 


forgetfulness unable to eat except with her fingers grabbing food.  She has been


seen and worked up extensively by other outside hospitals including [facility] and no 


diagnosis has been made and patient has continued to deteriorate.  According to 


 she has deteriorated since she is recently lost first-degree family 


members 1 year ago.   states that patient has deteriorated so much so 


that he feels that there is no quality of life and he is discussed terminal 


extubation with Dr. Sharif during this hospitalization..  But he has made it 


very clear there are other family members to have strong opinions in addition.  


He has made it very clear that he will make final decisions on whether not 


patient will have a terminal extubation this week.  Otherwise patient is 


received aggressive intervention for treatment of urinary tract infection 


corrective fluid and electrolyte abnormalities blood pressure control.


Cannot obtain





Past Medical History


Home Meds


Reported Medications


Cyanocobalamin* (Vitamin B-12*) 50 Mcg Tablet, 50 MCG PO DAILY, TAB


   2/4/19


Amlodipine Besylate* (Amlodipine Besylate*) 2.5 Mg Tablet, 5 MG PO DAILY, #30 


TAB


   2/4/19


Risperidone* (Risperidone*) 0.5 Mg Tablet, 0.5 MG PO DAILY, TAB


   2/4/19


Lamotrigine* (Lamotrigine*) 25 Mg Tablet, 25 MG PO DAILY, TAB


   2/4/19


Benztropine Mesylate* (Benztropine Mesylate*) 2 Mg Tablet, 2 MG PO BID, TAB


   2/4/19


Medications





Current Medications


IV Flush (NS 3 ml) 3 ml PER PROTOCOL IV ;  Start 2/4/19 at 03:30


Ondansetron HCl (Zofran Inj) 4 mg Q6H  PRN IV NAUSEA/VOMITING;  Start 2/4/19 at 


03:30


Acetaminophen (Tylenol Tab) 650 mg Q6H  PRN PO .PAIN 1-3 OR TEMP;  Start 2/4/19 


at 03:30


Docusate Sodium (Colace) 100 mg Q12H  PRN PO .CONSTIPATION;  Start 2/4/19 at 


03:30


Magnesium Hydroxide (Milk Of Mag) 30 ml DAILY  PRN PO .CONSTIPATION;  Start 


2/4/19 at 03:30


Heparin Sodium (Porcine) (Heparin  (5000 Units/1ml)) 5,000 unit Q12 SC  Last 


administered on 2/13/19at 08:45; Admin Dose 5,000 UNIT;  Start 2/4/19 at 09:00


Albuterol/ Ipratropium (Duoneb) 3 ml Q4H RESP THERAPY  PRN HHN SHORTNESS OF 


BREATH;  Start 2/4/19 at 03:30


Hydralazine HCl (Apresoline) 10 mg Q6H  PRN IV ELEVATED BLOOD PRESSURE;  Start 


2/4/19 at 03:30


Nitroglycerin (Nitroglycerin (Sl Tab) 0.4 Mg) 1 tab Q5M  PRN SL ANGINA;  Start 


2/4/19 at 03:30


Lorazepam (Ativan) 1 mg Q2H  PRN IV SEIZURES Last administered on 2/4/19at 


03:57; Admin Dose 1 MG;  Start 2/4/19 at 03:30


Miscellaneous Information (Pending Santyl Order For Wound Care) This patient 


ha... PRN  PRN XX WOUND CARE;  Start 2/4/19 at 05:00


Propofol 100 ml @  1.227 mls/ hr Q12H IV ;  Start 2/5/19 at 21:00


Collagenase (Santyl) 1 applic DAILY TOP  Last administered on 2/13/19at 08:36; 


Admin Dose 1 APPLIC;  Start 2/6/19 at 11:30


IV Flush (NS 10 ml) 10 ml PRN  PRN IV IV PROTOCOL;  Start 2/6/19 at 15:00


Cefepime HCl 50 ml @  100 mls/hr Q12 IVPB  Last administered on 2/13/19at 08:36;


Admin Dose 100 MLS/HR;  Start 2/8/19 at 13:00


Levetiracetam 250 mg/Dextrose 102.5 ml @  430 mls/hr Q12 IVPB  Last administered


on 2/13/19at 09:43; Admin Dose 430 MLS/HR;  Start 2/11/19 at 21:00


Lansoprazole (Prevacid) 30 mg DAILY@06 PO  Last administered on 2/13/19at 05:56;


Admin Dose 30 MG;  Start 2/12/19 at 06:00


Norepinephrine 250 ml @  1.875 mls/ hr TITRATE IV ;  Start 2/13/19 at 10:30


Allergies:  


Coded Allergies:  


     No Known Allergy (Unverified , 2/4/19)





Past Surgical History


Past Surgical Hx:  other





Family History


Significant Family History:  hypertension, other (Unknown unknown)





Social History


Alcohol Use:  none


Smoking Status:  Never smoker


Drug Use:  none





Exam/Review of Systems


Exam


Vitals





Vital Signs


  Date      Temp  Pulse  Resp  B/P (MAP)   Pulse Ox  O2          O2 Flow    FiO2


Time                                                 Delivery    Rate


   2/13/19           73    14      101/60       100


     09:30                           (74)


   2/13/19                                           Mechanical


     09:00                                           Ventilator


   2/13/19                                                                    30


     08:00


   2/13/19  98.7


     07:00








Intake and Output





2/12/19 2/12/19 2/13/19





1515:00


23:00


07:00





IntakeIntake Total


717.9 ml


732.9 ml


690 ml





OutputOutput Total


390 ml


360 ml


400 ml





BalanceBalance


327.9 ml


372.9 ml


290 ml











Constitutional:  non-verbal, frail


Head:  normocephalic, atraumatic; 


   No lacerations, No hematomas, No other


Eyes:  nl conjunctiva, EOMI, nl lids, nl sclera, PERRL; 


   No icteric, No fundi, disc, No other


ENMT:  nl external ears & nose, nl lips & teeth, nl nasal mucosa & septum; 


   No mucosa pink and moist, No intubated, No tympanic membranes, No other


Neck:  supple, non-tender; 


   No jvd, No bruits, No masses, No thyromegaly, No nuchal rigidity, No other


Respiratory:  clear to auscultation, normal air movement; 


   No congested cough, No crackles/rales, No diminished breath sounds, No 


intercostal retraction, No labored breathing, No respirations, No tactile 


fremitus, No wheezing, No other


Cardiovascular:  regular rate and rhythm, nl pulses; 


   No bruits, No diastolic murmur, No edema, No gallop, No irregular rhythm, No 


jugular venous distention (JVD), No murmurs/extra sounds, No rub, No systolic 


murmur, No S3, No S4, No other


Gastrointestinal:  soft, nl liver, spleen, non-tender; 


   No ascites, No bowel sounds, No distended, No firm, No hepatomegaly, No mass,


No rebound or guarding, No splenomegaly, No surgical scars, No tender, No other


Genitourinary - Female:  No nl adnexae, No nl external genitalia, No CMT, No CVA


tenderness, No uterus, No other





Results


Result Diagram:  


2/13/19 0447 2/13/19 0447





Results 24hrs





Laboratory Tests


    Test
                                2/13/19
04:47         2/13/19
07:00


    White Blood Count                           5.7  #


    Red Blood Count                            2.76  L


    Hemoglobin                                  8.6  L


    Hematocrit                                 25.8  L


    Mean Corpuscular Volume                     93.5


    Mean Corpuscular Hemoglobin                 31.2


    Mean Corpuscular Hemoglobin
Concent        33.3  
  



    Red Cell Distribution Width                 12.9


    Platelet Count                               189


    Mean Platelet Volume                       11.3  H


    Immature Granulocytes %                   1.200  H


    Neutrophils %


    Segmented Neutrophils %
(Manual)             66  
  



    Band Neutrophils % (Manual)                   7  H


    Lymphocytes %


    Lymphocytes % (Manual)                        16


    Reactive Lymphocytes %
(Manual)              3  H
  



    Monocytes %


    Monocytes % (Manual)                           5


    Eosinophils %


    Eosinophils % (Manual)                         3


    Basophils %


    Nucleated Red Blood Cells %                 0.4  H


    Immature Granulocytes #                   0.070  H


    Neutrophils #


    Neutrophils # (Manual)                       3.8


    Band Neutrophils #                           0.3


    Lymphocytes (Manual)                         0.9


    Lymphocytes #


    Reactive Lymphocytes #                      0.1  H


    Monocytes #


    Monocytes # (Manual)                        0.2  L


    Eosinophils #


    Basophils #


    Nucleated Red Blood Cells #


    Platelet Estimate                    NORMAL


    Polychromasia                                 1+


    Anisocytosis                                  1+


    Sodium Level                                130  L


    Potassium Level                              4.3


    Chloride Level                               96  L


    Carbon Dioxide Level                          29


    Anion Gap                                      5


    Blood Urea Nitrogen                           11


    Creatinine                                 0.34  L


    Est Glomerular Filtrat Rate
mL/min   > 60  
        



    Glucose Level                                110


    Calcium Level                               8.0  L


    Blood Gas Specimen Source
           
              Blood arterial



    Arterial Blood Date Drawn
           
              2/13/2019
7:28:03 AM


    Arterial Blood pH (Temp
corrected)   
                         7.517  H



    Arterial Blood pCO2 (Temp
correct)   
                           35.6  



    Arterial Blood pO2 (Temp
corrected)  
                           88.6  



    Arterial Blood HCO3                                              28.2  H


    Arterial Blood Base Excess                                        5.1  H


    Arterial Blood Oxygen
Saturation     
                           96.6  



    Praveen Test                                          ACCEPTAB


    Arterial Blood Gas Puncture
Site     
              Right Radial  



    Arterial Blood
Carboxyhemoglobin     
                            0.3  



    Arterial Blood Methemoglobin                                       0.2


    Blood Gas A-a O2 Differential                                    83.5  H


    Oxyhemoglobin Percent                                             96.1


    Blood Gas Temperature                                             37.0


    Blood Gas Respiration Rate                                        14.0


    Blood Gas Actual Respiration
Rate    
                             14  



    Blood Gas Modality                                  VENT - AC


    FiO2                                                              30.0


    Blood Gas Tidal Volume                                           400.0


    Blood Gas Low PEEP Setting                                         5.0


    Blood Gas Notified Whom                             TM


    Blood Gas Notified Time
             
              2/13/2019
7:59:36 AM








Medications


Medication





Current Medications


IV Flush (NS 3 ml) 3 ml PER PROTOCOL IV ;  Start 2/4/19 at 03:30


Ondansetron HCl (Zofran Inj) 4 mg Q6H  PRN IV NAUSEA/VOMITING;  Start 2/4/19 at 


03:30


Acetaminophen (Tylenol Tab) 650 mg Q6H  PRN PO .PAIN 1-3 OR TEMP;  Start 2/4/19 


at 03:30


Docusate Sodium (Colace) 100 mg Q12H  PRN PO .CONSTIPATION;  Start 2/4/19 at 


03:30


Magnesium Hydroxide (Milk Of Mag) 30 ml DAILY  PRN PO .CONSTIPATION;  Start 


2/4/19 at 03:30


Heparin Sodium (Porcine) (Heparin  (5000 Units/1ml)) 5,000 unit Q12 SC  Last 


administered on 2/13/19at 08:45; Admin Dose 5,000 UNIT;  Start 2/4/19 at 09:00


Albuterol/ Ipratropium (Duoneb) 3 ml Q4H RESP THERAPY  PRN HHN SHORTNESS OF 


BREATH;  Start 2/4/19 at 03:30


Hydralazine HCl (Apresoline) 10 mg Q6H  PRN IV ELEVATED BLOOD PRESSURE;  Start 


2/4/19 at 03:30


Nitroglycerin (Nitroglycerin (Sl Tab) 0.4 Mg) 1 tab Q5M  PRN SL ANGINA;  Start 


2/4/19 at 03:30


Lorazepam (Ativan) 1 mg Q2H  PRN IV SEIZURES Last administered on 2/4/19at 0


3:57; Admin Dose 1 MG;  Start 2/4/19 at 03:30


Miscellaneous Information (Pending Santyl Order For Wound Care) This patient 


ha... PRN  PRN XX WOUND CARE;  Start 2/4/19 at 05:00


Propofol 100 ml @  1.227 mls/ hr Q12H IV ;  Start 2/5/19 at 21:00


Collagenase (Santyl) 1 applic DAILY TOP  Last administered on 2/13/19at 08:36; 


Admin Dose 1 APPLIC;  Start 2/6/19 at 11:30


IV Flush (NS 10 ml) 10 ml PRN  PRN IV IV PROTOCOL;  Start 2/6/19 at 15:00


Cefepime HCl 50 ml @  100 mls/hr Q12 IVPB  Last administered on 2/13/19at 08:36;


Admin Dose 100 MLS/HR;  Start 2/8/19 at 13:00


Levetiracetam 250 mg/Dextrose 102.5 ml @  430 mls/hr Q12 IVPB  Last administered


on 2/13/19at 09:43; Admin Dose 430 MLS/HR;  Start 2/11/19 at 21:00


Lansoprazole (Prevacid) 30 mg DAILY@06 PO  Last administered on 2/13/19at 05:56;


Admin Dose 30 MG;  Start 2/12/19 at 06:00


Norepinephrine 250 ml @  1.875 mls/ hr TITRATE IV ;  Start 2/13/19 at 10:30











BRENDEN SORIANO             Feb 13, 2019 11:59

## 2019-02-13 NOTE — PN
Date/Time of Note


Date/Time of Note


DATE: 2/13/19 


TIME: 07:00





Assessment/Plan


VTE Prophylaxis


Risk score (from Nsg)>0 risk:  5


SCD applied (from Nsg):  Yes


Pharmacological prophylaxis:  heparin





Lines/Catheters


IV Catheter Type (from Nrsg):  PICC Line


Central line still needed:  Yes


Urinary Cath still in place:  Yes


Reason Cath still needed:  other (indicate)





Assessment/Plan


Hospital Course


S:  remains intubated, minimally Responsive and on minimal pressor support, 





O:


Constitutional:  postures to painful stimuli, frail, chronically ill looking


Head:  atraumatic, normocephalic, NC, RAUDEL sluggish


Neck:  non-tender, supple


Respiratory:  coarse, diminished 


Cardiovascular:  regular rate and rhythm


Gastrointestinal:  S/ NT / ND / +BS


Extremities:  no edema, good radial pulses, frail, trace brittanie le edema 





assessment and Plan:


57 yo F with a slow early onset dementia, cause unknown who is non verbal at 


baseline who was brought in for abnormal jerking behavior concerning for 


seizures 





# Seizure requiring intubation for airway protection


   -continue vent support for now


   -s/p aggressive seizure control for status epilepticus, has been getting 


serial EEGs


   -EEG from 2/10 showed : The slowing of the background indicates severe, 


diffuse cortical dysfunction of nonspecific etiology.


   -Neurology managing, patient on Keppra BID only for now


   -Avoid / Limit sedation if not ordered by neuro 





#Sepsis shock with aspiration PNA and UTI 


   - urine growing Streptococcus


   - Respiratory cultures growing Pseudomonas which is pansensitive 


   - ID managing antibiotics





# Chronic dementia/encephalopathy, 


   -nonverbal and bedbound at baseline.  Of gradual onset, no organic cause 


found despite extensive workup per 


   -cared for at home by  and children 24hours





# + HSV 1 IgG


   -will order IgM to assess acuity and relevance 


   -empiric antiviral ?





# Remote History of West Nile virus infection





#Chronic dyslipidemia





#Chronic hypertension: currently hypotensive





#Chronic psychiatric d/o on risperidone with benztropine for tardive dyskinesia 


?


   -dose of benztropine recently reduced 


   all pysch meds on hold for now 








Plan :


   -At this time continue ICU monitoring and management,


   -Spoke with neurology yesterday, plan is to give time for phenobarbital to 


wear off, neuro is expectant of improvement back to baseline, continue to defer 


to their recommendations, f/u phenobarbital levels 


   -Continue  tube feeds


   -Continue serial labs and close monitoring


   -Further interventions will depend on clinical course


   -POC discussed with nursing staff and consultants and family at bedside 


   


Prophylaxis : Heparin / Lansoprazole 





Care time 40mins


Result Diagram:  


2/12/19 0330                                                                    


           2/13/19 0447





Results 24hrs





Laboratory Tests


               Test
                                2/13/19
04:47


               White Blood Count                    Pending


               Red Blood Count                      Pending


               Hemoglobin                           Pending


               Hematocrit                           Pending


               Mean Corpuscular Volume              Pending


               Mean Corpuscular Hemoglobin          Pending


               Mean Corpuscular Hemoglobin
Concent  Pending  



               Red Cell Distribution Width          Pending


               Platelet Count                       Pending


               Mean Platelet Volume                 Pending


               Sodium Level                                130  L


               Potassium Level                              4.3


               Chloride Level                               96  L


               Carbon Dioxide Level                          29


               Anion Gap                                      5


               Blood Urea Nitrogen                           11


               Creatinine                                 0.34  L


               Est Glomerular Filtrat Rate
mL/min   > 60  



               Glucose Level                                110


               Calcium Level                               8.0  L








Exam/Review of Systems


Exam


Vitals





Vital Signs


  Date      Temp  Pulse  Resp  B/P (MAP)   Pulse Ox  O2          O2 Flow    FiO2


Time                                                 Delivery    Rate


   2/13/19           67    14                   100                           30


     06:20


   2/13/19                     96/70 (79)            Mechanical


     05:30                                           Ventilator


   2/13/19  97.9


     00:00








Intake and Output





2/12/19 2/12/19 2/13/19





1515:00


23:00


07:00





IntakeIntake Total


717.9 ml


732.9 ml


650 ml





OutputOutput Total


390 ml


360 ml


350 ml





BalanceBalance


327.9 ml


372.9 ml


300 ml














Results


Results 24hrs





Laboratory Tests


               Test
                                2/13/19
04:47


               White Blood Count                    Pending


               Red Blood Count                      Pending


               Hemoglobin                           Pending


               Hematocrit                           Pending


               Mean Corpuscular Volume              Pending


               Mean Corpuscular Hemoglobin          Pending


               Mean Corpuscular Hemoglobin
Concent  Pending  



               Red Cell Distribution Width          Pending


               Platelet Count                       Pending


               Mean Platelet Volume                 Pending


               Sodium Level                                130  L


               Potassium Level                              4.3


               Chloride Level                               96  L


               Carbon Dioxide Level                          29


               Anion Gap                                      5


               Blood Urea Nitrogen                           11


               Creatinine                                 0.34  L


               Est Glomerular Filtrat Rate
mL/min   > 60  



               Glucose Level                                110


               Calcium Level                               8.0  L








Medications


Medication





Current Medications


IV Flush (NS 3 ml) 3 ml PER PROTOCOL IV ;  Start 2/4/19 at 03:30


Ondansetron HCl (Zofran Inj) 4 mg Q6H  PRN IV NAUSEA/VOMITING;  Start 2/4/19 at 


03:30


Acetaminophen (Tylenol Tab) 650 mg Q6H  PRN PO .PAIN 1-3 OR TEMP;  Start 2/4/19 


at 03:30


Docusate Sodium (Colace) 100 mg Q12H  PRN PO .CONSTIPATION;  Start 2/4/19 at 


03:30


Magnesium Hydroxide (Milk Of Mag) 30 ml DAILY  PRN PO .CONSTIPATION;  Start 


2/4/19 at 03:30


Heparin Sodium (Porcine) (Heparin  (5000 Units/1ml)) 5,000 unit Q12 SC  Last adm


inistered on 2/12/19at 20:39; Admin Dose 5,000 UNIT;  Start 2/4/19 at 09:00


Albuterol/ Ipratropium (Duoneb) 3 ml Q4H RESP THERAPY  PRN HHN SHORTNESS OF 


BREATH;  Start 2/4/19 at 03:30


Hydralazine HCl (Apresoline) 10 mg Q6H  PRN IV ELEVATED BLOOD PRESSURE;  Start 


2/4/19 at 03:30


Nitroglycerin (Nitroglycerin (Sl Tab) 0.4 Mg) 1 tab Q5M  PRN SL ANGINA;  Start 


2/4/19 at 03:30


Lorazepam (Ativan) 1 mg Q2H  PRN IV SEIZURES Last administered on 2/4/19at 


03:57; Admin Dose 1 MG;  Start 2/4/19 at 03:30


Miscellaneous Information (Pending Santyl Order For Wound Care) This patient 


ha... PRN  PRN XX WOUND CARE;  Start 2/4/19 at 05:00


Propofol 100 ml @  1.227 mls/ hr Q12H IV ;  Start 2/5/19 at 21:00


Norepinephrine 250 ml @  1.875 mls/ hr TITRATE IV  Last administered on 


2/10/19at 16:40; Admin Dose 6.563 MLS/HR;  Start 2/5/19 at 23:00


Collagenase (Santyl) 1 applic DAILY TOP  Last administered on 2/12/19at 08:06; 


Admin Dose 1 APPLIC;  Start 2/6/19 at 11:30


IV Flush (NS 10 ml) 10 ml PRN  PRN IV IV PROTOCOL;  Start 2/6/19 at 15:00


Cefepime HCl 50 ml @  100 mls/hr Q12 IVPB  Last administered on 2/12/19at 20:37;


Admin Dose 100 MLS/HR;  Start 2/8/19 at 13:00


Dextrose/Lactated Ringer's 1,000 ml @  20 mls/hr Q24H IV  Last administered on 


2/12/19at 06:17; Admin Dose 20 MLS/HR;  Start 2/10/19 at 09:00


Levetiracetam 250 mg/Dextrose 102.5 ml @  430 mls/hr Q12 IVPB  Last administered


on 2/12/19at 21:19; Admin Dose 430 MLS/HR;  Start 2/11/19 at 21:00


Lansoprazole (Prevacid) 30 mg DAILY@06 PO  Last administered on 2/13/19at 05:56;


Admin Dose 30 MG;  Start 2/12/19 at 06:00











TEJAS SALMERON                Feb 13, 2019 07:03

## 2019-02-13 NOTE — CONS
Assessment/Plan


Assessment/Plan


Hospital Course


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 


p/w ams.


She was additionally reported to have a seizure... for which neurology is co


nsulted.





Most clinically consistent with new onset epilepsy.





Initial EEG was notable for GPEDs and an electrographic seizure originating in 


the R temporal region..


s/p Dilantin load, with ongoing episodic staring spells (presumed seizures) 


noted clinically..


s/p Phenobarbital load on 2/5..with resolution of episodes suspicious for 


seizure











P:


Await repeat phb level, to ensure clearance from system


Continue Keppra bid for now; 250mg..


Limit other sedating medications where possible


Other medical management per primary


Will follow clinically





Consultation Date/Type/Reason


Admit Date/Time


Feb 3, 2019 at 22:12


Type of Consult


Neurology


Requesting Provider:  LAURENCE CASTELLANO


Date/Time of Note


DATE: 2/13/19 


TIME: 13:30





24 HR Interval Summary


Free Text/Dictation


Continues icu care





Exam


Vital Signs


Vitals





Vital Signs


  Date      Temp  Pulse  Resp  B/P (MAP)   Pulse Ox  O2          O2 Flow    FiO2


Time                                                 Delivery    Rate


   2/13/19  98.0     68    14      101/68       100  Mechanical


     12:00                           (79)            Ventilator


   2/13/19                                                                    30


     08:00








Intake and Output





2/12/19 2/12/19 2/13/19





1515:00


23:00


07:00





IntakeIntake Total


717.9 ml


732.9 ml


690 ml





OutputOutput Total


390 ml


360 ml


400 ml





BalanceBalance


327.9 ml


372.9 ml


290 ml














Exam


PE:


Gen Appearance: No Apparent Distress


HEENT: Intubated


Cardiovascular: Regular rate


Abdomen: Soft


Extremities: Dry





NE:


The patient was obtunded and nonverbal.





Cranial nerve examination was limited by mental status. Pupils were equal and 


reactive to light. There was no afferent pupillary defect. Funduscopic 


examination was limited. Face was grossly symmetric, w/ present corneal and 


cough reflexes.





Tone was increased. Muscle bulk was reduced. I did not see fasciculations. The 


patient withdrew to noxious stimulation x 4.





Coordination and gait testing was limited by mental status.





Arm and leg reflexes were within normal limits and symmetric. Richmond's sign was


absent. Plantar responses were flexor.











JOSE FRANCOIS                 Feb 13, 2019 13:32

## 2019-02-14 VITALS — DIASTOLIC BLOOD PRESSURE: 60 MMHG | HEART RATE: 71 BPM | SYSTOLIC BLOOD PRESSURE: 95 MMHG | RESPIRATION RATE: 16 BRPM

## 2019-02-14 VITALS — HEART RATE: 71 BPM | DIASTOLIC BLOOD PRESSURE: 65 MMHG | RESPIRATION RATE: 14 BRPM | SYSTOLIC BLOOD PRESSURE: 99 MMHG

## 2019-02-14 VITALS — DIASTOLIC BLOOD PRESSURE: 70 MMHG | HEART RATE: 76 BPM | SYSTOLIC BLOOD PRESSURE: 100 MMHG | RESPIRATION RATE: 14 BRPM

## 2019-02-14 VITALS — SYSTOLIC BLOOD PRESSURE: 104 MMHG | RESPIRATION RATE: 16 BRPM | DIASTOLIC BLOOD PRESSURE: 69 MMHG | HEART RATE: 71 BPM

## 2019-02-14 VITALS — SYSTOLIC BLOOD PRESSURE: 96 MMHG | HEART RATE: 68 BPM | RESPIRATION RATE: 14 BRPM | DIASTOLIC BLOOD PRESSURE: 59 MMHG

## 2019-02-14 VITALS — RESPIRATION RATE: 13 BRPM | HEART RATE: 70 BPM | SYSTOLIC BLOOD PRESSURE: 100 MMHG | DIASTOLIC BLOOD PRESSURE: 62 MMHG

## 2019-02-14 VITALS — DIASTOLIC BLOOD PRESSURE: 63 MMHG | RESPIRATION RATE: 14 BRPM | HEART RATE: 69 BPM | SYSTOLIC BLOOD PRESSURE: 96 MMHG

## 2019-02-14 VITALS — RESPIRATION RATE: 12 BRPM | DIASTOLIC BLOOD PRESSURE: 75 MMHG | SYSTOLIC BLOOD PRESSURE: 110 MMHG | HEART RATE: 73 BPM

## 2019-02-14 VITALS — SYSTOLIC BLOOD PRESSURE: 112 MMHG | DIASTOLIC BLOOD PRESSURE: 75 MMHG | RESPIRATION RATE: 15 BRPM | HEART RATE: 75 BPM

## 2019-02-14 VITALS — RESPIRATION RATE: 19 BRPM | SYSTOLIC BLOOD PRESSURE: 102 MMHG | HEART RATE: 70 BPM | DIASTOLIC BLOOD PRESSURE: 68 MMHG

## 2019-02-14 VITALS — DIASTOLIC BLOOD PRESSURE: 61 MMHG | SYSTOLIC BLOOD PRESSURE: 92 MMHG | HEART RATE: 69 BPM | RESPIRATION RATE: 14 BRPM

## 2019-02-14 VITALS — DIASTOLIC BLOOD PRESSURE: 61 MMHG | HEART RATE: 71 BPM | RESPIRATION RATE: 16 BRPM | SYSTOLIC BLOOD PRESSURE: 93 MMHG

## 2019-02-14 VITALS — SYSTOLIC BLOOD PRESSURE: 100 MMHG | DIASTOLIC BLOOD PRESSURE: 64 MMHG | HEART RATE: 71 BPM | RESPIRATION RATE: 14 BRPM

## 2019-02-14 VITALS — SYSTOLIC BLOOD PRESSURE: 93 MMHG | DIASTOLIC BLOOD PRESSURE: 61 MMHG | HEART RATE: 72 BPM | RESPIRATION RATE: 13 BRPM

## 2019-02-14 VITALS — RESPIRATION RATE: 14 BRPM

## 2019-02-14 VITALS — RESPIRATION RATE: 25 BRPM | HEART RATE: 84 BPM | DIASTOLIC BLOOD PRESSURE: 97 MMHG | SYSTOLIC BLOOD PRESSURE: 128 MMHG

## 2019-02-14 VITALS — HEART RATE: 71 BPM | SYSTOLIC BLOOD PRESSURE: 102 MMHG | RESPIRATION RATE: 22 BRPM | DIASTOLIC BLOOD PRESSURE: 69 MMHG

## 2019-02-14 VITALS — HEART RATE: 71 BPM | RESPIRATION RATE: 25 BRPM | DIASTOLIC BLOOD PRESSURE: 55 MMHG | SYSTOLIC BLOOD PRESSURE: 95 MMHG

## 2019-02-14 VITALS — HEART RATE: 68 BPM | DIASTOLIC BLOOD PRESSURE: 53 MMHG | RESPIRATION RATE: 16 BRPM | SYSTOLIC BLOOD PRESSURE: 95 MMHG

## 2019-02-14 VITALS — RESPIRATION RATE: 21 BRPM | SYSTOLIC BLOOD PRESSURE: 97 MMHG | DIASTOLIC BLOOD PRESSURE: 65 MMHG | HEART RATE: 70 BPM

## 2019-02-14 VITALS — HEART RATE: 68 BPM | SYSTOLIC BLOOD PRESSURE: 109 MMHG | RESPIRATION RATE: 14 BRPM | DIASTOLIC BLOOD PRESSURE: 70 MMHG

## 2019-02-14 VITALS — DIASTOLIC BLOOD PRESSURE: 68 MMHG | RESPIRATION RATE: 17 BRPM | HEART RATE: 70 BPM | SYSTOLIC BLOOD PRESSURE: 100 MMHG

## 2019-02-14 VITALS — SYSTOLIC BLOOD PRESSURE: 105 MMHG | DIASTOLIC BLOOD PRESSURE: 70 MMHG | HEART RATE: 71 BPM | RESPIRATION RATE: 19 BRPM

## 2019-02-14 VITALS — DIASTOLIC BLOOD PRESSURE: 66 MMHG | RESPIRATION RATE: 20 BRPM | HEART RATE: 68 BPM | SYSTOLIC BLOOD PRESSURE: 100 MMHG

## 2019-02-14 VITALS — SYSTOLIC BLOOD PRESSURE: 89 MMHG | RESPIRATION RATE: 14 BRPM | HEART RATE: 70 BPM | DIASTOLIC BLOOD PRESSURE: 59 MMHG

## 2019-02-14 VITALS — RESPIRATION RATE: 14 BRPM | HEART RATE: 68 BPM | SYSTOLIC BLOOD PRESSURE: 107 MMHG | DIASTOLIC BLOOD PRESSURE: 71 MMHG

## 2019-02-14 VITALS — RESPIRATION RATE: 15 BRPM

## 2019-02-14 LAB
ADD MAN DIFF?: NO
ANION GAP: 7 (ref 5–13)
BASOPHIL #: 0 10^3/UL (ref 0–0.1)
BASOPHILS %: 0.3 % (ref 0–2)
BLOOD UREA NITROGEN: 12 MG/DL (ref 7–20)
CALCIUM: 7.8 MG/DL (ref 8.4–10.2)
CARBON DIOXIDE: 30 MMOL/L (ref 21–31)
CHLORIDE: 94 MMOL/L (ref 97–110)
CK INDEX: (no result)
CK INDEX: (no result)
CK-MB: 0.86 NG/ML (ref 0–2.4)
CK-MB: 1 NG/ML (ref 0–2.4)
CREATINE KINASE: < 20 IU/L (ref 23–200)
CREATINE KINASE: < 20 IU/L (ref 23–200)
CREATININE: 0.36 MG/DL (ref 0.44–1)
EOSINOPHILS #: 0.4 10^3/UL (ref 0–0.5)
EOSINOPHILS %: 3.1 % (ref 0–7)
GLUCOSE: 101 MG/DL (ref 70–220)
HEMATOCRIT: 26.3 % (ref 37–47)
HEMOGLOBIN: 8.8 G/DL (ref 12–16)
HEPATITIS B SURFACE ANTIGEN: REACTIVE
IMMATURE GRANS #M: 0.12 10^3/UL (ref 0–0.03)
IMMATURE GRANS % (M): 1 % (ref 0–0.43)
LYMPHOCYTES #: 1.4 10^3/UL (ref 0.8–2.9)
LYMPHOCYTES %: 12.1 % (ref 15–51)
MAGNESIUM: 2.1 MG/DL (ref 1.7–2.5)
MEAN CORPUSCULAR HEMOGLOBIN: 30.9 PG (ref 29–33)
MEAN CORPUSCULAR HGB CONC: 33.5 G/DL (ref 32–37)
MEAN CORPUSCULAR VOLUME: 92.3 FL (ref 82–101)
MEAN PLATELET VOLUME: 10.2 FL (ref 7.4–10.4)
MONOCYTE #: 1.2 10^3/UL (ref 0.3–0.9)
MONOCYTES %: 9.8 % (ref 0–11)
NEUTROPHIL #: 8.7 10^3/UL (ref 1.6–7.5)
NEUTROPHILS %: 73.7 % (ref 39–77)
NUCLEATED RED BLOOD CELLS #: 0 10^3/UL (ref 0–0)
NUCLEATED RED BLOOD CELLS%: 0 /100WBC (ref 0–0)
PHENOBARBITAL: 11.4 MG/L (ref 15–40)
PLATELET COUNT: 257 10^3/UL (ref 140–415)
POTASSIUM: 4.2 MMOL/L (ref 3.5–5.1)
RED BLOOD COUNT: 2.85 10^6/UL (ref 4.2–5.4)
RED CELL DISTRIBUTION WIDTH: 12.5 % (ref 11.5–14.5)
SODIUM: 131 MMOL/L (ref 135–144)
TROPONIN-I: 0.07 NG/ML (ref 0–0.12)
TROPONIN-I: 0.09 NG/ML (ref 0–0.12)
WHITE BLOOD COUNT: 11.8 10^3/UL (ref 4.8–10.8)

## 2019-02-14 RX ADMIN — HEPARIN SODIUM SCH UNIT: 5000 INJECTION, SOLUTION INTRAVENOUS; SUBCUTANEOUS at 21:08

## 2019-02-14 RX ADMIN — CEFEPIME 1 MLS/HR: 1 INJECTION, POWDER, FOR SOLUTION INTRAMUSCULAR; INTRAVENOUS at 21:07

## 2019-02-14 RX ADMIN — LANSOPRAZOLE SCH MG: 30 CAPSULE, DELAYED RELEASE PELLETS ORAL at 05:25

## 2019-02-14 RX ADMIN — FUROSEMIDE 1 MG: 10 INJECTION, SOLUTION INTRAVENOUS at 18:18

## 2019-02-14 RX ADMIN — CASTOR OIL AND BALSAM, PERU 1 APPLIC: 788; 87 OINTMENT TOPICAL at 08:30

## 2019-02-14 RX ADMIN — HEPARIN SODIUM 1 UNIT: 5000 INJECTION, SOLUTION INTRAVENOUS; SUBCUTANEOUS at 08:27

## 2019-02-14 RX ADMIN — COLLAGENASE SANTYL SCH APPLIC: 250 OINTMENT TOPICAL at 08:30

## 2019-02-14 RX ADMIN — CEFEPIME SCH MLS/HR: 1 INJECTION, POWDER, FOR SOLUTION INTRAMUSCULAR; INTRAVENOUS at 08:25

## 2019-02-14 RX ADMIN — LANSOPRAZOLE 1 MG: 30 CAPSULE, DELAYED RELEASE PELLETS ORAL at 05:25

## 2019-02-14 RX ADMIN — HEPARIN SODIUM 1 UNIT: 5000 INJECTION, SOLUTION INTRAVENOUS; SUBCUTANEOUS at 21:08

## 2019-02-14 RX ADMIN — CEFEPIME SCH MLS/HR: 1 INJECTION, POWDER, FOR SOLUTION INTRAMUSCULAR; INTRAVENOUS at 21:07

## 2019-02-14 RX ADMIN — COLLAGENASE SANTYL 1 APPLIC: 250 OINTMENT TOPICAL at 08:30

## 2019-02-14 RX ADMIN — PROPOFOL SCH MLS/HR: 10 INJECTION, EMULSION INTRAVENOUS at 08:24

## 2019-02-14 RX ADMIN — LEVETIRACETAM SCH MLS/HR: 100 INJECTION, SOLUTION INTRAVENOUS at 10:07

## 2019-02-14 RX ADMIN — LEVETIRACETAM 1 MLS/HR: 100 INJECTION, SOLUTION INTRAVENOUS at 21:47

## 2019-02-14 RX ADMIN — LEVETIRACETAM SCH MLS/HR: 100 INJECTION, SOLUTION INTRAVENOUS at 21:47

## 2019-02-14 RX ADMIN — CEFEPIME 1 MLS/HR: 1 INJECTION, POWDER, FOR SOLUTION INTRAMUSCULAR; INTRAVENOUS at 08:25

## 2019-02-14 RX ADMIN — CASTOR OIL AND BALSAM, PERU 1 APPLIC: 788; 87 OINTMENT TOPICAL at 21:09

## 2019-02-14 RX ADMIN — LEVETIRACETAM 1 MLS/HR: 100 INJECTION, SOLUTION INTRAVENOUS at 10:07

## 2019-02-14 RX ADMIN — HEPARIN SODIUM SCH UNIT: 5000 INJECTION, SOLUTION INTRAVENOUS; SUBCUTANEOUS at 08:27

## 2019-02-14 RX ADMIN — PROPOFOL 1 MLS/HR: 10 INJECTION, EMULSION INTRAVENOUS at 08:24

## 2019-02-14 NOTE — PN
Date/Time of Note


Date/Time of Note


DATE: 2/14/19 


TIME: 17:03





Assessment/Plan


VTE Prophylaxis


Risk score (from Nsg)>0 risk:  7


SCD applied (from Nsg):  Yes


Pharmacological prophylaxis:  heparin





Lines/Catheters


IV Catheter Type (from Nrsg):  PICC Line


Central line still needed:  Yes


Urinary Cath still in place:  Yes


Reason Cath still needed:  other (indicate)





Assessment/Plan


Hospital Course


S:  remains intubated, 





O:


Constitutional:  eyes open  to painful stimuli, frail, chronically ill looking


Head:  atraumatic, normocephalic, NC, RAUDEL sluggish


Neck:  non-tender, supple


Respiratory:  coarse, diminished 


Cardiovascular:  regular rate and rhythm


Gastrointestinal:  S/ NT / ND / +BS


Extremities:  no edema, good radial pulses, frail, trace brittanie le edema 





assessment and Plan:


57 yo F with a slow early onset dementia, cause unknown who is non verbal at 


baseline who was brought in for abnormal jerking behavior concerning for 


seizures 





# Seizure requiring intubation for airway protection


   -continue vent support for now


   -s/p aggressive seizure control for status epilepticus, has been getting 


serial EEGs and serial phenobarbital levels


   -EEG from 2/10 showed : The slowing of the background indicates severe, 


diffuse cortical dysfunction of nonspecific etiology.


   -Neurology managing, patient on Keppra BID only for now


   -Avoid / Limit sedation if not ordered by neuro 





#Sepsis shock with aspiration PNA and UTI 


   - urine growing Streptococcus


   - Respiratory cultures growing Pseudomonas which is pansensitive 


   - ID managing antibiotics





# Chronic dementia/encephalopathy, 


   -nonverbal and bedbound at baseline.  Of gradual onset, no organic cause 


found despite extensive workup per 


   -cared for at home by  and children 24hours





# + HSV 1 IgG


   -will order IgM to assess acuity and relevance 


   -empiric antiviral ?





# Remote History of West Nile virus infection





#Chronic dyslipidemia





#Chronic hypertension: currently hypotensive





#Chronic psychiatric d/o on risperidone with benztropine for tardive dyskinesia 


?


   -dose of benztropine recently reduced 


   -all pysch meds on hold for now 





# positive IgG titres for HSV 1 and VZV


   - IgM ordered 








Plan :


   -At this time continue ICU monitoring and management,


   -Spoke with neurology yesterday, plan is to give time for phenobarbital to 


wear off, neuro is expectant of improvement back to baseline, continue to defer 


to their recommendations, f/u phenobarbital levels, level  today subtherapeutic


   -review condition with family and neuro tomorrow 


   -Continue  tube feeds


   -Continue serial labs and close monitoring


   -Further interventions will depend on clinical course


   -POC discussed with nursing staff and consultants and family at bedside 


   


Prophylaxis : Heparin / Lansoprazole 





Care time 40mins


Result Diagram:  


2/14/19 0434                                                                    


           2/14/19 0431





Results 24hrs





Laboratory Tests


Test
                              2/14/19
04:31  2/14/19
04:34    2/14/19
11:32


Sodium Level                              131  L


Potassium Level                            4.2


Chloride Level                             94  L


Carbon Dioxide Level                        30


Anion Gap                                    7


Blood Urea Nitrogen                         12


Creatinine                               0.36  L


Est Glomerular Filtrat             > 60  
        
              



Rate
mL/min


Glucose Level                              101


Calcium Level                             7.8  L


Magnesium Level                            2.1


White Blood Count                                      11.8  #H


Red Blood Count                                         2.85  L


Hemoglobin                                               8.8  L


Hematocrit                                              26.3  L


Mean Corpuscular Volume                                  92.3


Mean Corpuscular Hemoglobin                              30.9


Mean Corpuscular                   
                    33.5  
  



Hemoglobin
Concent


Red Cell Distribution Width                              12.5


Platelet Count                                           257  #


Mean Platelet Volume                                     10.2


Immature Granulocytes %                                1.000  H


Neutrophils %                                            73.7


Lymphocytes %                                           12.1  L


Monocytes %                                               9.8


Eosinophils %                                             3.1


Basophils %                                               0.3


Nucleated Red Blood Cells %                               0.0


Immature Granulocytes #                                0.120  H


Neutrophils #                                            8.7  H


Lymphocytes #                                             1.4


Monocytes #                                              1.2  H


Eosinophils #                                             0.4


Basophils #                                               0.0


Nucleated Red Blood Cells #                               0.0


Lab Scanned Report                                               [facility]








Exam/Review of Systems


Exam


Vitals





Vital Signs


  Date      Temp  Pulse  Resp  B/P (MAP)   Pulse Ox  O2          O2 Flow    FiO2


Time                                                 Delivery    Rate


   2/14/19           69


     16:00


   2/14/19  98.1           14  96/63 (74)       100  Mechanical


     16:00                                           Ventilator


   2/14/19                                                                    30


     14:48








Intake and Output





2/13/19 2/13/19 2/14/19





1515:00


23:00


07:00





IntakeIntake Total


622.5 ml


432.5 ml


670 ml





OutputOutput Total


1600 ml


610 ml


795 ml





BalanceBalance


-977.5 ml


-177.5 ml


-125 ml














Results


Results 24hrs





Laboratory Tests


Test
                              2/14/19
04:31  2/14/19
04:34    2/14/19
11:32


Sodium Level                              131  L


Potassium Level                            4.2


Chloride Level                             94  L


Carbon Dioxide Level                        30


Anion Gap                                    7


Blood Urea Nitrogen                         12


Creatinine                               0.36  L


Est Glomerular Filtrat             > 60  
        
              



Rate
mL/min


Glucose Level                              101


Calcium Level                             7.8  L


Magnesium Level                            2.1


White Blood Count                                      11.8  #H


Red Blood Count                                         2.85  L


Hemoglobin                                               8.8  L


Hematocrit                                              26.3  L


Mean Corpuscular Volume                                  92.3


Mean Corpuscular Hemoglobin                              30.9


Mean Corpuscular                   
                    33.5  
  



Hemoglobin
Concent


Red Cell Distribution Width                              12.5


Platelet Count                                           257  #


Mean Platelet Volume                                     10.2


Immature Granulocytes %                                1.000  H


Neutrophils %                                            73.7


Lymphocytes %                                           12.1  L


Monocytes %                                               9.8


Eosinophils %                                             3.1


Basophils %                                               0.3


Nucleated Red Blood Cells %                               0.0


Immature Granulocytes #                                0.120  H


Neutrophils #                                            8.7  H


Lymphocytes #                                             1.4


Monocytes #                                              1.2  H


Eosinophils #                                             0.4


Basophils #                                               0.0


Nucleated Red Blood Cells #                               0.0


Lab Scanned Report                                               [facility]








Medications


Medication





Current Medications


IV Flush (NS 3 ml) 3 ml PER PROTOCOL IV ;  Start 2/4/19 at 03:30


Ondansetron HCl (Zofran Inj) 4 mg Q6H  PRN IV NAUSEA/VOMITING;  Start 2/4/19 at 


03:30


Acetaminophen (Tylenol Tab) 650 mg Q6H  PRN PO .PAIN 1-3 OR TEMP;  Start 2/4/19 


at 03:30


Docusate Sodium (Colace) 100 mg Q12H  PRN PO .CONSTIPATION;  Start 2/4/19 at 


03:30


Magnesium Hydroxide (Milk Of Mag) 30 ml DAILY  PRN PO .CONSTIPATION;  Start 


2/4/19 at 03:30


Heparin Sodium (Porcine) (Heparin  (5000 Units/1ml)) 5,000 unit Q12 SC  Last 


administered on 2/14/19at 08:27; Admin Dose 5,000 UNIT;  Start 2/4/19 at 09:00


Albuterol/ Ipratropium (Duoneb) 3 ml Q4H RESP THERAPY  PRN HHN SHORTNESS OF 


BREATH;  Start 2/4/19 at 03:30


Hydralazine HCl (Apresoline) 10 mg Q6H  PRN IV ELEVATED BLOOD PRESSURE;  Start 


2/4/19 at 03:30


Nitroglycerin (Nitroglycerin (Sl Tab) 0.4 Mg) 1 tab Q5M  PRN SL ANGINA;  Start 


2/4/19 at 03:30


Lorazepam (Ativan) 1 mg Q2H  PRN IV SEIZURES Last administered on 2/4/19at 


03:57; Admin Dose 1 MG;  Start 2/4/19 at 03:30


Miscellaneous Information (Pending Santyl Order For Wound Care) This patient 


ha... PRN  PRN XX WOUND CARE;  Start 2/4/19 at 05:00


Propofol 100 ml @  1.227 mls/ hr Q12H IV ;  Start 2/5/19 at 21:00


Collagenase (Santyl) 1 applic DAILY TOP  Last administered on 2/13/19at 08:36; 


Admin Dose 1 APPLIC;  Start 2/6/19 at 11:30


IV Flush (NS 10 ml) 10 ml PRN  PRN IV IV PROTOCOL;  Start 2/6/19 at 15:00


Cefepime HCl 50 ml @  100 mls/hr Q12 IVPB  Last administered on 2/14/19at 08:25;


Admin Dose 100 MLS/HR;  Start 2/8/19 at 13:00


Levetiracetam 250 mg/Dextrose 102.5 ml @  430 mls/hr Q12 IVPB  Last administered


on 2/14/19at 10:07; Admin Dose 430 MLS/HR;  Start 2/11/19 at 21:00


Lansoprazole (Prevacid) 30 mg DAILY@06 PO  Last administered on 2/14/19at 05:25;


Admin Dose 30 MG;  Start 2/12/19 at 06:00


Norepinephrine 250 ml @  1.875 mls/ hr TITRATE IV ;  Start 2/13/19 at 10:30











TEJAS SALMERON                Feb 14, 2019 17:12

## 2019-02-14 NOTE — CONS
Assessment/Plan


Assessment/Plan


Hospital Course


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 


p/w ams.


She was additionally reported to have a seizure... for which neurology is co


nsulted.





Most clinically consistent with new onset epilepsy.





Initial EEG was notable for GPEDs and an electrographic seizure originating in 


the R temporal region..


s/p Dilantin load, with ongoing episodic staring spells (presumed seizures) 


noted clinically..


s/p Phenobarbital load on 2/5..with resolution of episodes suspicious for 


seizure











P:


Repeat phb level, to ensure clearance from system


Continue Keppra bid for now; 250mg..


Limit other sedating medications where possible


Other medical management per primary


Will follow clinically





Consultation Date/Type/Reason


Admit Date/Time


Feb 3, 2019 at 22:12


Type of Consult


Neurology


Reason for Consultation


new onset seizures


Requesting Provider:  LAURENCE CASTELLANO


Date/Time of Note


DATE: 2/14/19 


TIME: 13:42





24 HR Interval Summary


Free Text/Dictation


Continues critical care. Pt reportedly waking up a little bit more per family at


bedside. Pt remains unable to contribute at this time.


Subjective hx not possible:  pt non-verbal, pt critical





Exam


Vital Signs


Vitals





Vital Signs


  Date      Temp  Pulse  Resp  B/P (MAP)   Pulse Ox  O2          O2 Flow    FiO2


Time                                                 Delivery    Rate


   2/14/19  98.7     76    14      100/70       100  Mechanical


     12:00                           (80)            Ventilator


   2/14/19                                                                    30


     07:37








Intake and Output





2/13/19 2/13/19 2/14/19





1515:00


23:00


07:00





IntakeIntake Total


622.5 ml


432.5 ml


670 ml





OutputOutput Total


1600 ml


610 ml


795 ml





BalanceBalance


-977.5 ml


-177.5 ml


-125 ml














Exam


PE:


Gen Appearance: No Apparent Distress


HEENT: Intubated


Cardiovascular: Regular rate


Abdomen: Soft


Extremities: Dry





NE:


The patient was obtunded and nonverbal. Difficult to arouse, however the pt 


stayed awake once woken up. Did not track or follow commands. 





Cranial nerve examination was limited by mental status. Pupils were equal and 


reactive to light. There was no afferent pupillary defect. Funduscopic 


examination was limited. Face was grossly symmetric, w/ present corneal and 


cough reflexes.





Tone was spastic in BUE. Muscle bulk was reduced. I did not see fasciculations. 


The patient withdrew to noxious stimulation x 4.





Coordination and gait testing was limited by mental status.





Arm and leg reflexes were within normal limits and symmetric. Richmond's sign was


absent. Plantar responses were flexor.











JULIETA SINGH NP          Feb 14, 2019 13:42

## 2019-02-14 NOTE — CONS
Assessment/Plan


Assessment/Plan


Hospital Course (Demo Recall)


No acute events no fevers patient is in no distress comfortable on vent, off 


pressors





WBC 11.8 platelets 257 neutrophils 73.7 BUN 12 creatinine 0.36





Antimicrobials: Cefepime





Microbiology: Urine culture grew Streptococcus, sputum culture + pseudomonas 


aeruginosa





Indwelling: Endotracheal tube, orogastric tube Davila catheter left upper 


extremity PICC line





Physical examination: Well-developed chronically ill-appearing middle-aged woman


who is intubated in no distress.  Head atraumatic normocephalic.  Sclera 


nonicteric.  Neck is supple.  Chest rise symmetrical, breath sounds diminished b


ases.  Heart: S1-S2.  Abdomen soft bowel sounds present, hypoactive.  


Extremities cyanotic with trace edema





Assessment:


1.  Sepsis, s/p shock 


2.  Gram-positive cocci bacteremia cw contaminant


3.  Respiratory failure/PNA


4.  New onset seizures


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


5.  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


6.  Status post urinary tract infection





Plan: Remains stable off pressors continue present care, antibiotics, 


neurology/pulmonary recommendations, family to decide regarding plan of care





Consultation Date/Type/Reason


Admit Date/Time


Feb 3, 2019 at 22:12


Initial Consult Date


2/5/19


Type of Consult


id


Requesting Provider:  LAURENCE CASTELLANO


Date/Time of Note


DATE: 2/14/19 


TIME: 13:33





Exam/Review of Systems


Exam


Vitals





Vital Signs


  Date      Temp  Pulse  Resp  B/P (MAP)   Pulse Ox  O2          O2 Flow    FiO2


Time                                                 Delivery    Rate


   2/14/19  98.7     76    14      100/70       100  Mechanical


     12:00                           (80)            Ventilator


   2/14/19                                                                    30


     07:37








Intake and Output





2/13/19 2/13/19 2/14/19





1515:00


23:00


07:00





IntakeIntake Total


622.5 ml


432.5 ml


670 ml





OutputOutput Total


1600 ml


610 ml


795 ml





BalanceBalance


-977.5 ml


-177.5 ml


-125 ml














Results


Result Diagram:  


2/14/19 0434                                                                    


           2/14/19 0431





Results 24hrs





Laboratory Tests


Test
                              2/14/19
04:31  2/14/19
04:34    2/14/19
11:32


Sodium Level                              131  L


Potassium Level                            4.2


Chloride Level                             94  L


Carbon Dioxide Level                        30


Anion Gap                                    7


Blood Urea Nitrogen                         12


Creatinine                               0.36  L


Est Glomerular Filtrat             > 60  
        
              



Rate
mL/min


Glucose Level                              101


Calcium Level                             7.8  L


Magnesium Level                            2.1


White Blood Count                                      11.8  #H


Red Blood Count                                         2.85  L


Hemoglobin                                               8.8  L


Hematocrit                                              26.3  L


Mean Corpuscular Volume                                  92.3


Mean Corpuscular Hemoglobin                              30.9


Mean Corpuscular                   
                    33.5  
  



Hemoglobin
Concent


Red Cell Distribution Width                              12.5


Platelet Count                                           257  #


Mean Platelet Volume                                     10.2


Immature Granulocytes %                                1.000  H


Neutrophils %                                            73.7


Lymphocytes %                                           12.1  L


Monocytes %                                               9.8


Eosinophils %                                             3.1


Basophils %                                               0.3


Nucleated Red Blood Cells %                               0.0


Immature Granulocytes #                                0.120  H


Neutrophils #                                            8.7  H


Lymphocytes #                                             1.4


Monocytes #                                              1.2  H


Eosinophils #                                             0.4


Basophils #                                               0.0


Nucleated Red Blood Cells #                               0.0


Lab Scanned Report                                               [facility]








Medications


Medication





Current Medications


IV Flush (NS 3 ml) 3 ml PER PROTOCOL IV ;  Start 2/4/19 at 03:30


Ondansetron HCl (Zofran Inj) 4 mg Q6H  PRN IV NAUSEA/VOMITING;  Start 2/4/19 at 


03:30


Acetaminophen (Tylenol Tab) 650 mg Q6H  PRN PO .PAIN 1-3 OR TEMP;  Start 2/4/19 


at 03:30


Docusate Sodium (Colace) 100 mg Q12H  PRN PO .CONSTIPATION;  Start 2/4/19 at 


03:30


Magnesium Hydroxide (Milk Of Mag) 30 ml DAILY  PRN PO .CONSTIPATION;  Start 


2/4/19 at 03:30


Heparin Sodium (Porcine) (Heparin  (5000 Units/1ml)) 5,000 unit Q12 SC  Last 


administered on 2/14/19at 08:27; Admin Dose 5,000 UNIT;  Start 2/4/19 at 09:00


Albuterol/ Ipratropium (Duoneb) 3 ml Q4H RESP THERAPY  PRN HHN SHORTNESS OF 


BREATH;  Start 2/4/19 at 03:30


Hydralazine HCl (Apresoline) 10 mg Q6H  PRN IV ELEVATED BLOOD PRESSURE;  Start 


2/4/19 at 03:30


Nitroglycerin (Nitroglycerin (Sl Tab) 0.4 Mg) 1 tab Q5M  PRN SL ANGINA;  Start 


2/4/19 at 03:30


Lorazepam (Ativan) 1 mg Q2H  PRN IV SEIZURES Last administered on 2/4/19at 


03:57; Admin Dose 1 MG;  Start 2/4/19 at 03:30


Miscellaneous Information (Pending Santyl Order For Wound Care) This patient 


ha... PRN  PRN XX WOUND CARE;  Start 2/4/19 at 05:00


Propofol 100 ml @  1.227 mls/ hr Q12H IV ;  Start 2/5/19 at 21:00


Collagenase (Santyl) 1 applic DAILY TOP  Last administered on 2/13/19at 08:36; 


Admin Dose 1 APPLIC;  Start 2/6/19 at 11:30


IV Flush (NS 10 ml) 10 ml PRN  PRN IV IV PROTOCOL;  Start 2/6/19 at 15:00


Cefepime HCl 50 ml @  100 mls/hr Q12 IVPB  Last administered on 2/14/19at 08:25;


Admin Dose 100 MLS/HR;  Start 2/8/19 at 13:00


Levetiracetam 250 mg/Dextrose 102.5 ml @  430 mls/hr Q12 IVPB  Last administered


on 2/14/19at 10:07; Admin Dose 430 MLS/HR;  Start 2/11/19 at 21:00


Lansoprazole (Prevacid) 30 mg DAILY@06 PO  Last administered on 2/14/19at 05:25;


Admin Dose 30 MG;  Start 2/12/19 at 06:00


Norepinephrine 250 ml @  1.875 mls/ hr TITRATE IV ;  Start 2/13/19 at 10:30











RUKHSANA ZEPEDA NP            Feb 14, 2019 13:34

## 2019-02-14 NOTE — CONS
Consult Date/Type/Reason


Admit Date/Time


Feb 3, 2019 at 22:12


Initial Consult Date


2/5/19


Type of Consult


Pulmonary


Requesting Provider:  LAURENCE CASTELLANO


Date/Time of Note


DATE: 2/14/19 


TIME: 10:20





Subjective


Opens eyes but not following commands.  Currently hemodynamically stable.





Objective





Vital Signs


  Date      Temp  Pulse  Resp  B/P (MAP)   Pulse Ox  O2          O2 Flow    FiO2


Time                                                 Delivery    Rate


   2/14/19           70    13      100/62       100  Mechanical


     10:00                           (75)            Ventilator


   2/14/19  98.4


     08:00


   2/14/19                                                                    30


     07:37








Intake and Output





2/13/19 2/13/19 2/14/19





1515:00


23:00


07:00





IntakeIntake Total


622.5 ml


432.5 ml


670 ml





OutputOutput Total


1600 ml


610 ml


795 ml





BalanceBalance


-977.5 ml


-177.5 ml


-125 ml











Exam


GENERAL: Chronically ill-appearing lady on mechanical ventilation


VITAL SIGNS:  per chart


NECK:  Supple.  No JVD or lymphadenopathy.


CARDIAC EXAM:  S1, S2. No added sounds or murmurs.


CHEST:  clear bilaterally, No added sounds, rales or wheezes


ABDOMEN:  Soft, nontender.  No guarding or rebound.


EXTREMITIES:  No cyanosis, clubbing or edema.


NEUROLOGIC:  Generalized weakness.  Unable to assess





Vent Setting


Ventilator Support Mode:  AC


Fraction of Inspired Oxygen pe:  30


Positive End Expiratory Pressu:  5.0





Results/Medications


Result Diagram:  


2/14/19 0434                                                                    


           2/14/19 0431





Results 24 hrs





Laboratory Tests


       Test
                                2/14/19
04:31  2/14/19
04:34


       Sodium Level                                131  L


       Potassium Level                              4.2


       Chloride Level                               94  L


       Carbon Dioxide Level                          30


       Anion Gap                                      7


       Blood Urea Nitrogen                           12


       Creatinine                                 0.36  L


       Est Glomerular Filtrat Rate
mL/min   > 60  
        



       Glucose Level                                101


       Calcium Level                               7.8  L


       Magnesium Level                              2.1


       White Blood Count                                        11.8  #H


       Red Blood Count                                           2.85  L


       Hemoglobin                                                 8.8  L


       Hematocrit                                                26.3  L


       Mean Corpuscular Volume                                    92.3


       Mean Corpuscular Hemoglobin                                30.9


       Mean Corpuscular Hemoglobin
Concent  
                    33.5  



       Red Cell Distribution Width                                12.5


       Platelet Count                                             257  #


       Mean Platelet Volume                                       10.2


       Immature Granulocytes %                                  1.000  H


       Neutrophils %                                              73.7


       Lymphocytes %                                             12.1  L


       Monocytes %                                                 9.8


       Eosinophils %                                               3.1


       Basophils %                                                 0.3


       Nucleated Red Blood Cells %                                 0.0


       Immature Granulocytes #                                  0.120  H


       Neutrophils #                                              8.7  H


       Lymphocytes #                                               1.4


       Monocytes #                                                1.2  H


       Eosinophils #                                               0.4


       Basophils #                                                 0.0


       Nucleated Red Blood Cells #                                 0.0





Medications





Current Medications


IV Flush (NS 3 ml) 3 ml PER PROTOCOL IV ;  Start 2/4/19 at 03:30


Ondansetron HCl (Zofran Inj) 4 mg Q6H  PRN IV NAUSEA/VOMITING;  Start 2/4/19 at 


03:30


Acetaminophen (Tylenol Tab) 650 mg Q6H  PRN PO .PAIN 1-3 OR TEMP;  Start 2/4/19 


at 03:30


Docusate Sodium (Colace) 100 mg Q12H  PRN PO .CONSTIPATION;  Start 2/4/19 at 


03:30


Magnesium Hydroxide (Milk Of Mag) 30 ml DAILY  PRN PO .CONSTIPATION;  Start 


2/4/19 at 03:30


Heparin Sodium (Porcine) (Heparin  (5000 Units/1ml)) 5,000 unit Q12 SC  Last 


administered on 2/14/19at 08:27; Admin Dose 5,000 UNIT;  Start 2/4/19 at 09:00


Albuterol/ Ipratropium (Duoneb) 3 ml Q4H RESP THERAPY  PRN HHN SHORTNESS OF 


BREATH;  Start 2/4/19 at 03:30


Hydralazine HCl (Apresoline) 10 mg Q6H  PRN IV ELEVATED BLOOD PRESSURE;  Start 


2/4/19 at 03:30


Nitroglycerin (Nitroglycerin (Sl Tab) 0.4 Mg) 1 tab Q5M  PRN SL ANGINA;  Start 


2/4/19 at 03:30


Lorazepam (Ativan) 1 mg Q2H  PRN IV SEIZURES Last administered on 2/4/19at 


03:57; Admin Dose 1 MG;  Start 2/4/19 at 03:30


Miscellaneous Information (Pending Santyl Order For Wound Care) This patient 


ha... PRN  PRN XX WOUND CARE;  Start 2/4/19 at 05:00


Propofol 100 ml @  1.227 mls/ hr Q12H IV ;  Start 2/5/19 at 21:00


Collagenase (Santyl) 1 applic DAILY TOP  Last administered on 2/13/19at 08:36; 


Admin Dose 1 APPLIC;  Start 2/6/19 at 11:30


IV Flush (NS 10 ml) 10 ml PRN  PRN IV IV PROTOCOL;  Start 2/6/19 at 15:00


Cefepime HCl 50 ml @  100 mls/hr Q12 IVPB  Last administered on 2/14/19at 08:25;


Admin Dose 100 MLS/HR;  Start 2/8/19 at 13:00


Levetiracetam 250 mg/Dextrose 102.5 ml @  430 mls/hr Q12 IVPB  Last administered


on 2/14/19at 10:07; Admin Dose 430 MLS/HR;  Start 2/11/19 at 21:00


Lansoprazole (Prevacid) 30 mg DAILY@06 PO  Last administered on 2/14/19at 05:25;


Admin Dose 30 MG;  Start 2/12/19 at 06:00


Norepinephrine 250 ml @  1.875 mls/ hr TITRATE IV ;  Start 2/13/19 at 10:30





Assessment/Plan


Hospital Course (Demo Recall)


IMP:


1.  Encephalopathy--post-ictal state s/p status epilepticus.  Possibly secondary


to elevated phenobarbital level.


2.  Vent Dependence 2/2 #1


3.  Metabolic acidosis likely secondary to hypoperfusion.  Now improved


4.  Status post septic Shock


5.  s/p lactic acidosis 


 


RECS: 


1.  Titrate levophed to MAP > 65 mm Hg


2.  Intravenous antibiotics; de-escalate pending cultures


3.  Continue antiepileptics per Neuro 


4.  Minimize sedation; continue to follow neuro exam


5.  DVT and GI prophylaxis 





Family meeting Friday morning.  To discuss goals of care.  Family leaning 


towards terminal extubation.


We will continue supportive care.











RIC CAROLINA MD, [facility]     Feb 14, 2019 10:21

## 2019-02-15 VITALS — RESPIRATION RATE: 14 BRPM | HEART RATE: 70 BPM | SYSTOLIC BLOOD PRESSURE: 101 MMHG | DIASTOLIC BLOOD PRESSURE: 61 MMHG

## 2019-02-15 VITALS — HEART RATE: 75 BPM | RESPIRATION RATE: 14 BRPM | DIASTOLIC BLOOD PRESSURE: 65 MMHG | SYSTOLIC BLOOD PRESSURE: 108 MMHG

## 2019-02-15 VITALS — HEART RATE: 72 BPM | RESPIRATION RATE: 16 BRPM | SYSTOLIC BLOOD PRESSURE: 104 MMHG | DIASTOLIC BLOOD PRESSURE: 68 MMHG

## 2019-02-15 VITALS — HEART RATE: 71 BPM | DIASTOLIC BLOOD PRESSURE: 63 MMHG | RESPIRATION RATE: 23 BRPM | SYSTOLIC BLOOD PRESSURE: 100 MMHG

## 2019-02-15 VITALS — RESPIRATION RATE: 18 BRPM | DIASTOLIC BLOOD PRESSURE: 67 MMHG | HEART RATE: 68 BPM | SYSTOLIC BLOOD PRESSURE: 101 MMHG

## 2019-02-15 VITALS — DIASTOLIC BLOOD PRESSURE: 70 MMHG | HEART RATE: 74 BPM | RESPIRATION RATE: 16 BRPM | SYSTOLIC BLOOD PRESSURE: 105 MMHG

## 2019-02-15 VITALS — RESPIRATION RATE: 14 BRPM

## 2019-02-15 VITALS — SYSTOLIC BLOOD PRESSURE: 102 MMHG | DIASTOLIC BLOOD PRESSURE: 66 MMHG | RESPIRATION RATE: 15 BRPM | HEART RATE: 73 BPM

## 2019-02-15 VITALS — HEART RATE: 68 BPM | RESPIRATION RATE: 16 BRPM | DIASTOLIC BLOOD PRESSURE: 63 MMHG | SYSTOLIC BLOOD PRESSURE: 103 MMHG

## 2019-02-15 VITALS — SYSTOLIC BLOOD PRESSURE: 95 MMHG | DIASTOLIC BLOOD PRESSURE: 58 MMHG | RESPIRATION RATE: 14 BRPM | HEART RATE: 71 BPM

## 2019-02-15 VITALS — DIASTOLIC BLOOD PRESSURE: 67 MMHG | SYSTOLIC BLOOD PRESSURE: 104 MMHG | RESPIRATION RATE: 14 BRPM | HEART RATE: 70 BPM

## 2019-02-15 VITALS — HEART RATE: 78 BPM | SYSTOLIC BLOOD PRESSURE: 72 MMHG | DIASTOLIC BLOOD PRESSURE: 22 MMHG | RESPIRATION RATE: 20 BRPM

## 2019-02-15 VITALS — HEART RATE: 74 BPM | SYSTOLIC BLOOD PRESSURE: 103 MMHG | DIASTOLIC BLOOD PRESSURE: 69 MMHG | RESPIRATION RATE: 20 BRPM

## 2019-02-15 VITALS — RESPIRATION RATE: 21 BRPM | SYSTOLIC BLOOD PRESSURE: 99 MMHG | DIASTOLIC BLOOD PRESSURE: 60 MMHG | HEART RATE: 70 BPM

## 2019-02-15 VITALS — DIASTOLIC BLOOD PRESSURE: 66 MMHG | SYSTOLIC BLOOD PRESSURE: 100 MMHG | RESPIRATION RATE: 16 BRPM | HEART RATE: 73 BPM

## 2019-02-15 VITALS — DIASTOLIC BLOOD PRESSURE: 63 MMHG | SYSTOLIC BLOOD PRESSURE: 98 MMHG | HEART RATE: 72 BPM | RESPIRATION RATE: 17 BRPM

## 2019-02-15 VITALS — DIASTOLIC BLOOD PRESSURE: 75 MMHG | RESPIRATION RATE: 35 BRPM | HEART RATE: 76 BPM | SYSTOLIC BLOOD PRESSURE: 114 MMHG

## 2019-02-15 VITALS — RESPIRATION RATE: 16 BRPM | SYSTOLIC BLOOD PRESSURE: 106 MMHG | DIASTOLIC BLOOD PRESSURE: 67 MMHG | HEART RATE: 69 BPM

## 2019-02-15 VITALS — RESPIRATION RATE: 23 BRPM

## 2019-02-15 VITALS — DIASTOLIC BLOOD PRESSURE: 66 MMHG | HEART RATE: 72 BPM | SYSTOLIC BLOOD PRESSURE: 100 MMHG | RESPIRATION RATE: 22 BRPM

## 2019-02-15 VITALS — RESPIRATION RATE: 25 BRPM | HEART RATE: 69 BPM

## 2019-02-15 VITALS — DIASTOLIC BLOOD PRESSURE: 62 MMHG | SYSTOLIC BLOOD PRESSURE: 98 MMHG | RESPIRATION RATE: 22 BRPM | HEART RATE: 70 BPM

## 2019-02-15 VITALS — HEART RATE: 69 BPM | SYSTOLIC BLOOD PRESSURE: 97 MMHG | RESPIRATION RATE: 15 BRPM | DIASTOLIC BLOOD PRESSURE: 62 MMHG

## 2019-02-15 VITALS — DIASTOLIC BLOOD PRESSURE: 63 MMHG | RESPIRATION RATE: 16 BRPM | HEART RATE: 70 BPM | SYSTOLIC BLOOD PRESSURE: 97 MMHG

## 2019-02-15 VITALS — HEART RATE: 68 BPM

## 2019-02-15 LAB
ADD MAN DIFF?: NO
ALANINE AMINOTRANSFERASE: 31 IU/L (ref 13–69)
ALBUMIN/GLOBULIN RATIO: 1
ALBUMIN: 2.2 G/DL (ref 3.3–4.9)
ALKALINE PHOSPHATASE: 34 IU/L (ref 42–121)
ALLEN TEST: (no result)
ANION GAP: 5 (ref 5–13)
ARTERIAL BASE EXCESS: 4.3 MMOL/L (ref -3–3)
ARTERIAL BLOOD GAS OXYGEN SAT: 98.8 MMHG (ref 95–98)
ARTERIAL COHB: 0.3 % (ref 0–3)
ARTERIAL FRACTION OF OXYHGB: 98.1 % (ref 93–99)
ARTERIAL HCO3: 27.7 MMOL/L (ref 22–26)
ARTERIAL METHB: 0.4 % (ref 0–1.5)
ARTERIAL PCO2: 36.2 MMHG (ref 35–45)
ASPARTATE AMINO TRANSFERASE: 25 IU/L (ref 15–46)
BASOPHIL #: 0 10^3/UL (ref 0–0.1)
BASOPHILS %: 0.3 % (ref 0–2)
BILIRUBIN,DIRECT: 0 MG/DL (ref 0–0.2)
BILIRUBIN,TOTAL: 0 MG/DL (ref 0.2–1.3)
BLOOD GAS LOW PEEP SETTING: 5 CMH2O
BLOOD GAS PS: 10
BLOOD UREA NITROGEN: 12 MG/DL (ref 7–20)
CALCIUM: 8.2 MG/DL (ref 8.4–10.2)
CARBON DIOXIDE: 30 MMOL/L (ref 21–31)
CHLORIDE: 93 MMOL/L (ref 97–110)
CK INDEX: (no result)
CK-MB: 1.02 NG/ML (ref 0–2.4)
CREATINE KINASE: < 20 IU/L (ref 23–200)
CREATININE: 0.42 MG/DL (ref 0.44–1)
EOSINOPHILS #: 0.3 10^3/UL (ref 0–0.5)
EOSINOPHILS %: 2.5 % (ref 0–7)
FIO2: 30 %
GLOBULIN: 2.2 G/DL (ref 1.3–3.2)
GLUCOSE: 133 MG/DL (ref 70–220)
HEMATOCRIT: 25 % (ref 37–47)
HEMOGLOBIN: 8.5 G/DL (ref 12–16)
IMMATURE GRANS #M: 0.1 10^3/UL (ref 0–0.03)
IMMATURE GRANS % (M): 0.8 % (ref 0–0.43)
LYMPHOCYTES #: 1.4 10^3/UL (ref 0.8–2.9)
LYMPHOCYTES %: 11.3 % (ref 15–51)
MAGNESIUM: 2 MG/DL (ref 1.7–2.5)
MEAN CORPUSCULAR HEMOGLOBIN: 31.1 PG (ref 29–33)
MEAN CORPUSCULAR HGB CONC: 34 G/DL (ref 32–37)
MEAN CORPUSCULAR VOLUME: 91.6 FL (ref 82–101)
MEAN PLATELET VOLUME: 9.9 FL (ref 7.4–10.4)
MODE: (no result)
MONOCYTE #: 1.1 10^3/UL (ref 0.3–0.9)
MONOCYTES %: 8.7 % (ref 0–11)
NEUTROPHIL #: 9.3 10^3/UL (ref 1.6–7.5)
NEUTROPHILS %: 76.4 % (ref 39–77)
NUCLEATED RED BLOOD CELLS #: 0 10^3/UL (ref 0–0)
NUCLEATED RED BLOOD CELLS%: 0 /100WBC (ref 0–0)
O2 A-A PPRESDIFF RESPIRATORY: 26 MMHG (ref 7–24)
PHOSPHORUS: 4.8 MG/DL (ref 2.5–4.9)
PLATELET COUNT: 352 10^3/UL (ref 140–415)
POTASSIUM: 4.6 MMOL/L (ref 3.5–5.1)
RED BLOOD COUNT: 2.73 10^6/UL (ref 4.2–5.4)
RED CELL DISTRIBUTION WIDTH: 12.6 % (ref 11.5–14.5)
SODIUM: 128 MMOL/L (ref 135–144)
TOTAL PROTEIN: 4.4 G/DL (ref 6.1–8.1)
TROPONIN-I: 0.08 NG/ML (ref 0–0.12)
WHITE BLOOD COUNT: 12.1 10^3/UL (ref 4.8–10.8)

## 2019-02-15 RX ADMIN — CEFEPIME 1 MLS/HR: 1 INJECTION, POWDER, FOR SOLUTION INTRAMUSCULAR; INTRAVENOUS at 20:54

## 2019-02-15 RX ADMIN — LEVETIRACETAM 1 MLS/HR: 100 INJECTION, SOLUTION INTRAVENOUS at 20:54

## 2019-02-15 RX ADMIN — LEVETIRACETAM SCH MLS/HR: 100 INJECTION, SOLUTION INTRAVENOUS at 09:25

## 2019-02-15 RX ADMIN — CEFEPIME SCH MLS/HR: 1 INJECTION, POWDER, FOR SOLUTION INTRAMUSCULAR; INTRAVENOUS at 20:54

## 2019-02-15 RX ADMIN — LEVETIRACETAM SCH MLS/HR: 100 INJECTION, SOLUTION INTRAVENOUS at 20:54

## 2019-02-15 RX ADMIN — HEPARIN SODIUM 1 UNIT: 5000 INJECTION, SOLUTION INTRAVENOUS; SUBCUTANEOUS at 09:43

## 2019-02-15 RX ADMIN — CEFEPIME 1 MLS/HR: 1 INJECTION, POWDER, FOR SOLUTION INTRAMUSCULAR; INTRAVENOUS at 09:25

## 2019-02-15 RX ADMIN — LANSOPRAZOLE SCH MG: 30 CAPSULE, DELAYED RELEASE PELLETS ORAL at 05:15

## 2019-02-15 RX ADMIN — LEVETIRACETAM 1 MLS/HR: 100 INJECTION, SOLUTION INTRAVENOUS at 09:25

## 2019-02-15 RX ADMIN — HEPARIN SODIUM SCH UNIT: 5000 INJECTION, SOLUTION INTRAVENOUS; SUBCUTANEOUS at 09:43

## 2019-02-15 RX ADMIN — PROPOFOL SCH MLS/HR: 10 INJECTION, EMULSION INTRAVENOUS at 09:00

## 2019-02-15 RX ADMIN — LANSOPRAZOLE 1 MG: 30 CAPSULE, DELAYED RELEASE PELLETS ORAL at 05:15

## 2019-02-15 RX ADMIN — COLLAGENASE SANTYL 1 APPLIC: 250 OINTMENT TOPICAL at 09:25

## 2019-02-15 RX ADMIN — COLLAGENASE SANTYL SCH APPLIC: 250 OINTMENT TOPICAL at 09:25

## 2019-02-15 RX ADMIN — HEPARIN SODIUM SCH UNIT: 5000 INJECTION, SOLUTION INTRAVENOUS; SUBCUTANEOUS at 20:55

## 2019-02-15 RX ADMIN — CASTOR OIL AND BALSAM, PERU 1 APPLIC: 788; 87 OINTMENT TOPICAL at 09:46

## 2019-02-15 RX ADMIN — CEFEPIME SCH MLS/HR: 1 INJECTION, POWDER, FOR SOLUTION INTRAMUSCULAR; INTRAVENOUS at 09:25

## 2019-02-15 RX ADMIN — PROPOFOL 1 MLS/HR: 10 INJECTION, EMULSION INTRAVENOUS at 09:00

## 2019-02-15 RX ADMIN — HEPARIN SODIUM 1 UNIT: 5000 INJECTION, SOLUTION INTRAVENOUS; SUBCUTANEOUS at 20:55

## 2019-02-15 NOTE — CONS
Consult Date/Type/Reason


Admit Date/Time


Feb 3, 2019 at 22:12


Initial Consult Date


2/5/19


Type of Consult


Pulmonary


Requesting Provider:  LAURENCE CASTELLANO


Date/Time of Note


DATE: 2/15/19 


TIME: 11:22





Subjective


Patient remains mostly somnolent on mechanical ventilation, opens eyes but not 


following commands.





Objective





Vital Signs


  Date      Temp  Pulse  Resp  B/P (MAP)   Pulse Ox  O2          O2 Flow    FiO2


Time                                                 Delivery    Rate


   2/15/19                                                                    30


     09:40


   2/15/19           73


     08:00


   2/15/19                 14                   100


     05:20


   2/15/19  98.0                   114/75            Mechanical


     04:00                           (88)            Ventilator








Intake and Output





2/14/19


2/14/19


2/15/19





1515:00


23:00


07:00





IntakeIntake Total


572.5 ml


572.5 ml


430 ml





OutputOutput Total


500 ml


915 ml


450 ml





BalanceBalance


72.5 ml


-342.5 ml


-20 ml











Exam


GENERAL: Chronically ill-appearing lady on mechanical ventilation


VITAL SIGNS:  per chart


NECK:  Supple.  No JVD or lymphadenopathy.


CARDIAC EXAM:  S1, S2. No added sounds or murmurs.


CHEST:  clear bilaterally, No added sounds, rales or wheezes


ABDOMEN:  Soft, nontender.  No guarding or rebound.


EXTREMITIES:  No cyanosis, clubbing or edema.


NEUROLOGIC:  Generalized weakness.  Unable to assess





Vent Setting


Ventilator Support Mode:  CPAP, PS


Fraction of Inspired Oxygen pe:  30


Positive End Expiratory Pressu:  5.0





Results/Medications


Result Diagram:  


2/15/19 0415                                                                    


           2/15/19 0415





Results 24 hrs





Laboratory Tests


Test
                 2/14/19
11:32  2/14/19
18:02  2/14/19
23:11  2/15/19
04:15


Lab Scanned Report  REFERENCE LAB


Creatine Kinase                      < 20  L        < 20  L        < 20  L


Creatine Kinase


Index


Creatinine Kinase                           1.00           0.86           1.02


MB (Mass)


Troponin I                                 0.075          0.091          0.084


White Blood Count                                                        12.1  H


Red Blood Count                                                          2.73  L


Hemoglobin                                                                8.5  L


Hematocrit                                                               25.0  L


Mean Corpuscular                                                          91.6


Volume


Mean Corpuscular                                                          31.1


Hemoglobin


Mean Corpuscular    
                
              
                    34.0  



Hemoglobin
Concent


Red Cell                                                                  12.6


Distribution Width


Platelet Count                                                            352  #


Mean Platelet                                                              9.9


Volume


Immature                                                                0.800  H


Granulocytes %


Neutrophils %                                                             76.4


Lymphocytes %                                                            11.3  L


Monocytes %                                                                8.7


Eosinophils %                                                              2.5


Basophils %                                                                0.3


Nucleated Red                                                              0.0


Blood Cells %


Immature                                                                0.100  H


Granulocytes #


Neutrophils #                                                             9.3  H


Lymphocytes #                                                              1.4


Monocytes #                                                               1.1  H


Eosinophils #                                                              0.3


Basophils #                                                                0.0


Nucleated Red                                                              0.0


Blood Cells #


Sodium Level                                                              128  L


Potassium Level                                                            4.6


Chloride Level                                                             93  L


Carbon Dioxide                                                              30


Level


Anion Gap                                                                    5


Blood Urea                                                                  12


Nitrogen


Creatinine                                                               0.42  L


Est Glomerular      
                
              
              > 60  



Filtrat


Rate
mL/min


Glucose Level                                                              133


Calcium Level                                                             8.2  L


Phosphorus Level                                                           4.8


Magnesium Level                                                            2.0


Total Bilirubin                                                           0.0  L


Direct Bilirubin                                                          0.00


Indirect Bilirubin                                                         0.0


Aspartate Amino     
                
              
                      25  



Transf
(AST/SGOT)


Alanine             
                
              
                      31  



Aminotransferase
(


ALT/SGPT)


Alkaline                                                                   34  L


Phosphatase


Total Protein                                                             4.4  L


Albumin                                                                   2.2  L


Globulin                                                                  2.20


Albumin/Globulin                                                          1.00


Ratio


Test
                 2/15/19
09:17  
              
              



Lab Scanned Report  REFERENCE LAB





Medications





Current Medications


IV Flush (NS 3 ml) 3 ml PER PROTOCOL IV ;  Start 2/4/19 at 03:30


Ondansetron HCl (Zofran Inj) 4 mg Q6H  PRN IV NAUSEA/VOMITING;  Start 2/4/19 at 


03:30


Acetaminophen (Tylenol Tab) 650 mg Q6H  PRN PO .PAIN 1-3 OR TEMP;  Start 2/4/19 


at 03:30


Docusate Sodium (Colace) 100 mg Q12H  PRN PO .CONSTIPATION;  Start 2/4/19 at 


03:30


Magnesium Hydroxide (Milk Of Mag) 30 ml DAILY  PRN PO .CONSTIPATION;  Start 


2/4/19 at 03:30


Heparin Sodium (Porcine) (Heparin  (5000 Units/1ml)) 5,000 unit Q12 SC  Last 


administered on 2/15/19at 09:43; Admin Dose 5,000 UNIT;  Start 2/4/19 at 09:00


Albuterol/ Ipratropium (Duoneb) 3 ml Q4H RESP THERAPY  PRN HHN SHORTNESS OF 


BREATH;  Start 2/4/19 at 03:30


Hydralazine HCl (Apresoline) 10 mg Q6H  PRN IV ELEVATED BLOOD PRESSURE;  Start 


2/4/19 at 03:30


Nitroglycerin (Nitroglycerin (Sl Tab) 0.4 Mg) 1 tab Q5M  PRN SL ANGINA;  Start 


2/4/19 at 03:30


Lorazepam (Ativan) 1 mg Q2H  PRN IV SEIZURES Last administered on 2/4/19at 


03:57; Admin Dose 1 MG;  Start 2/4/19 at 03:30


Miscellaneous Information (Pending Santyl Order For Wound Care) This patient ha


... PRN  PRN XX WOUND CARE;  Start 2/4/19 at 05:00


Propofol 100 ml @  1.227 mls/ hr Q12H IV ;  Start 2/5/19 at 21:00


Collagenase (Santyl) 1 applic DAILY TOP  Last administered on 2/15/19at 09:25; 


Admin Dose 1 APPLIC;  Start 2/6/19 at 11:30


IV Flush (NS 10 ml) 10 ml PRN  PRN IV IV PROTOCOL;  Start 2/6/19 at 15:00


Cefepime HCl 50 ml @  100 mls/hr Q12 IVPB  Last administered on 2/15/19at 09:25;


Admin Dose 100 MLS/HR;  Start 2/8/19 at 13:00


Levetiracetam 250 mg/Dextrose 102.5 ml @  430 mls/hr Q12 IVPB  Last administered


on 2/15/19at 09:25; Admin Dose 430 MLS/HR;  Start 2/11/19 at 21:00


Lansoprazole (Prevacid) 30 mg DAILY@06 PO  Last administered on 2/15/19at 05:15;


Admin Dose 30 MG;  Start 2/12/19 at 06:00


Norepinephrine 250 ml @  1.875 mls/ hr TITRATE IV ;  Start 2/13/19 at 10:30





Assessment/Plan


Hospital Course (Demo Recall)


IMP:


1.  Encephalopathy--post-ictal state s/p status epilepticus.  Possibly secondary


to elevated phenobarbital level.


2.  Vent Dependence 2/2 #1


3.  Metabolic acidosis likely secondary to hypoperfusion.  Now improved


4.  Status post septic Shock


5.  s/p lactic acidosis 


 


RECS: 


1.  Titrate levophed to MAP > 65 mm Hg


2.  Intravenous antibiotics; de-escalate pending cultures


3.  Continue antiepileptics per Neuro 


4.  Minimize sedation; continue to follow neuro exam


5.  DVT and GI prophylaxis 





Long discussion with family at bedside today.  Family have declined tracheostomy


and PEG tube.  Goal is to attempt extubation if she is not successfully 


extubated family will transition to comfort measures.  Either way post 


extubation patient should be evaluated by inpatient hospice team.











RIC CAROLINA MD, [facility]     Feb 15, 2019 11:23

## 2019-02-15 NOTE — CONS
Assessment/Plan


Assessment/Plan


Hospital Course


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 


p/w ams.


She was additionally reported to have a seizure... for which neurology is co


nsulted.





Most clinically consistent with new onset epilepsy.





Initial EEG was notable for GPEDs and an electrographic seizure originating in 


the R temporal region..


s/p Dilantin load, with ongoing episodic staring spells (presumed seizures) 


noted clinically..


s/p Phenobarbital load on 2/5..with resolution of episodes suspicious for 


seizure








P:


Await repeat phb level, to ensure clearance from system


Continue Keppra bid for now; 250mg..


Limit other sedating medications where possible


Other medical management per primary


Will follow clinically





Consultation Date/Type/Reason


Admit Date/Time


Feb 3, 2019 at 22:12


Type of Consult


Neurology


Reason for Consultation


new onset seizures


Requesting Provider:  LAURENCE CASTELLANO


Date/Time of Note


DATE: 2/15/19 


TIME: 11:59





24 HR Interval Summary


Free Text/Dictation


Continues critical care. Tolerating spontaneous breathing trial. There are 


discussions about possible terminal extubation this weekend. Awaiting phb level.


Subjective hx not possible:  pt non-verbal, pt critical





Exam


Vital Signs


Vitals





Vital Signs


  Date      Temp  Pulse  Resp  B/P (MAP)   Pulse Ox  O2          O2 Flow    FiO2


Time                                                 Delivery    Rate


   2/15/19           74    17      105/70       100  Mechanical


     11:00                           (82)            Ventilator


   2/15/19                                                                    30


     09:40


   2/15/19  98.8


     08:00








Intake and Output





2/14/19


2/14/19


2/15/19





1515:00


23:00


07:00





IntakeIntake Total


572.5 ml


572.5 ml


470 ml





OutputOutput Total


500 ml


915 ml


450 ml





BalanceBalance


72.5 ml


-342.5 ml


20 ml














Exam


PE:


Gen Appearance: No Apparent Distress


HEENT: Intubated


Cardiovascular: Regular rate


Abdomen: Soft


Extremities: Dry





NE:


The patient was obtunded and nonverbal. Did not track or follow commands. 





Cranial nerve examination was limited by mental status. Pupils were equal and 


reactive to light. There was no afferent pupillary defect. Funduscopic 


examination was limited. Face was grossly symmetric, w/ present corneal and 


cough reflexes.





Tone was spastic in BUE. Muscle bulk was reduced. I did not see fasciculations. 


The patient withdrew to noxious stimulation x 4.





Coordination and gait testing was limited by mental status.





Arm and leg reflexes were within normal limits and symmetric. Richmond's sign was


absent. Plantar responses were flexor.











JULIETA SINGH NP          Feb 15, 2019 11:59

## 2019-02-15 NOTE — PN
Date/Time of Note


Date/Time of Note


DATE: 2/15/19 


TIME: 12:31





Assessment/Plan


VTE Prophylaxis


Risk score (from Nsg)>0 risk:  6


SCD applied (from Ns):  Yes


Pharmacological prophylaxis:  heparin





Lines/Catheters


IV Catheter Type (from Nrs):  PICC Line


Central line still needed:  Yes


Urinary Cath still in place:  Yes


Reason Cath still needed:  other (indicate)





Assessment/Plan


Hospital Course


SUBJECTIVE:


On CPAP trial.





OBJECTIVE:


Physical Exam


General: Thin, frail looking 58 year-old female lying in bed in no apparent 


distress.


HEENT: Normocephalic, atraumatic. Eyes: Anicteric sclerae, conjunctivae clear. 


ENT: Nasal septum id midline, oral mucosa id dry.


Respiratory: Bilaterally diminished breath sounds.  ETT to mechanical 


ventilator.


Cardiovascular: S1, S2 heard.  Regular rate and rhythm.


Abdomen: Soft, nontender, and nondistended. Bowel sounds positive in all 4 


quadrants.


Genitourinary: Deferred.


Extremities: No cyanosis, no clubbing.  Trace bilateral pedal edema.


Neurologic: Encephalopathic.





Labs & Vitals per chart





ASSESSMENT & PLAN





58-year-old female with past medical history of hypertension, dyslipidemia with 


significant dementia.  The patient is chronically bedridden and nonverbal being 


taken care for at home by her .  The patient was brought to an outside 


hospital ER because of altered mental status.  The patient was brought to [facility] for further evaluation because of insurance reasons.  The 


patient developed status epilepticus refractory to conventional medications that


required starting the patient on phenobarbital after intubating the patient.





1.  Intractable seizure requiring intubation for airway protection.


-Continue Keppra.


-Status post phenobarbital.





2.  Sepsis with septic shock secondary to aspiration pneumonia.


-Sputum culture positive for pseudomonas aeruginosa.


-Continue antimicrobials as per ID.





3.  Positive HSV 1 IgG.


-IgM pending.





4.  Positive VZV IgG.


-IgM negative.





5.  Dyslipidemia.


-Fasting lipid panel satisfactory.





6.  History of hypertension.


-Currently hypotensive.





7.  Chronic dementia.


-The patient currently is a chemical code only.





8.  Fluids, electrolytes, and nutrition.


-Continue NGT feedings.





9.  DVT prophylaxis.


-Bilateral SCDs.


-SQ heparin.





10.  Plan.


-Ventilator weaning as per pulmonary.


-Await clinical improvement.





The patient was seen in collaboration with Dr. Hsu.





Critical care time: 35 minutes.


Result Diagram:  


2/15/19 0415                                                                    


           2/15/19 0415





Results 24hrs





Laboratory Tests


Test
               2/14/19
18:02  2/14/19
23:11  2/15/19
04:15    2/15/19
09:17


Creatine Kinase   < 20  L          < 20  L        < 20  L


Creatine Kinase


Index


Creatinine                 1.00           0.86           1.02


Kinase MB (Mass)


Troponin I                0.075          0.091          0.084


White Blood                                             12.1  H


Count


Red Blood Count                                         2.73  L


Hemoglobin                                               8.5  L


Hematocrit                                              25.0  L


Mean Corpuscular                                         91.6


Volume


Mean Corpuscular                                         31.1


Hemoglobin


Mean Corpuscular  
                
                    34.0  
  



Hemoglobin
Carolyn


nt


Red Cell                                                 12.6


Distribution


Width


Platelet Count                                           352  #


Mean Platelet                                             9.9


Volume


Immature                                               0.800  H


Granulocytes %


Neutrophils %                                            76.4


Lymphocytes %                                           11.3  L


Monocytes %                                               8.7


Eosinophils %                                             2.5


Basophils %                                               0.3


Nucleated Red                                             0.0


Blood Cells %


Immature                                               0.100  H


Granulocytes #


Neutrophils #                                            9.3  H


Lymphocytes #                                             1.4


Monocytes #                                              1.1  H


Eosinophils #                                             0.3


Basophils #                                               0.0


Nucleated Red                                             0.0


Blood Cells #


Sodium Level                                             128  L


Potassium Level                                           4.6


Chloride Level                                            93  L


Carbon Dioxide                                             30


Level


Anion Gap                                                   5


Blood Urea                                                 12


Nitrogen


Creatinine                                              0.42  L


Est Glomerular    
                
              > 60  
        



Filtrat


Rate
mL/min


Glucose Level                                             133


Calcium Level                                            8.2  L


Phosphorus Level                                          4.8


Magnesium Level                                           2.0


Total Bilirubin                                          0.0  L


Direct Bilirubin                                         0.00


Indirect                                                  0.0


Bilirubin


Aspartate Amino   
                
                      25  
  



Transf
(AST/SGOT


)


Alanine           
                
                      31  
  



Aminotransferase



(ALT/SGPT)


Alkaline                                                  34  L


Phosphatase


Total Protein                                            4.4  L


Albumin                                                  2.2  L


Globulin                                                 2.20


Albumin/Globulin                                         1.00


Ratio


Lab Scanned                                                      REFERENCE LAB


Report


Test
               2/15/19
11:45  
              
              



Blood Gas         Blood arterial
  
              
              



Specimen Source



Arterial Blood    2/15/2019
11:43  
              
              



Date Drawn
                :01 AM


Arterial Blood          7.501  H
  
              
              



pH


(Temp
corrected)


Arterial Blood            36.2  
  
              
              



pCO2


(Temp
correct)


Arterial Blood          145.4  H
  
              
              



pO2


(Temp
corrected)


Arterial Blood            27.7  H


HCO3


Arterial Blood             4.3  H


Base Excess


Arterial Blood           98.8  H
  
              
              



Oxygen
Saturatio


n


Praveen Test        ACCEPTAB


Arterial Blood    Right Radial  
  
              
              



Gas


Puncture
Site


Arterial                   0.3  
  
              
              



Blood
Carboxyhem


oglobin


Arterial Blood              0.4


Methemoglobin


Blood Gas A-a O2          26.0  H


Differential


Oxyhemoglobin              98.1


Percent


Blood Gas                  37.0


Temperature


Blood Gas         VENT - CPAP


Modality


FiO2                       30.0


Blood Gas Low               5.0


PEEP Setting


Blood Gas                    10


Pressure Support


Blood Gas         TM


Notified Whom


Blood Gas         2/15/2019
11:51  
              
              



Notified Time
             :43 AM








Exam/Review of Systems


Exam


Vitals





Vital Signs


  Date      Temp  Pulse  Resp  B/P (MAP)   Pulse Ox  O2          O2 Flow    FiO2


Time                                                 Delivery    Rate


   2/15/19           74    17      105/70       100  Mechanical


     11:00                           (82)            Ventilator


   2/15/19                                                                    30


     09:40


   2/15/19  98.8


     08:00








Intake and Output





2/14/19


2/14/19


2/15/19





1515:00


23:00


07:00





IntakeIntake Total


572.5 ml


572.5 ml


470 ml





OutputOutput Total


500 ml


915 ml


450 ml





BalanceBalance


72.5 ml


-342.5 ml


20 ml














Results


Results 24hrs





Laboratory Tests


Test
               2/14/19
18:02  2/14/19
23:11  2/15/19
04:15    2/15/19
09:17


Creatine Kinase   < 20  L          < 20  L        < 20  L


Creatine Kinase


Index


Creatinine                 1.00           0.86           1.02


Kinase MB (Mass)


Troponin I                0.075          0.091          0.084


White Blood                                             12.1  H


Count


Red Blood Count                                         2.73  L


Hemoglobin                                               8.5  L


Hematocrit                                              25.0  L


Mean Corpuscular                                         91.6


Volume


Mean Corpuscular                                         31.1


Hemoglobin


Mean Corpuscular  
                
                    34.0  
  



Hemoglobin
Carolyn


nt


Red Cell                                                 12.6


Distribution


Width


Platelet Count                                           352  #


Mean Platelet                                             9.9


Volume


Immature                                               0.800  H


Granulocytes %


Neutrophils %                                            76.4


Lymphocytes %                                           11.3  L


Monocytes %                                               8.7


Eosinophils %                                             2.5


Basophils %                                               0.3


Nucleated Red                                             0.0


Blood Cells %


Immature                                               0.100  H


Granulocytes #


Neutrophils #                                            9.3  H


Lymphocytes #                                             1.4


Monocytes #                                              1.1  H


Eosinophils #                                             0.3


Basophils #                                               0.0


Nucleated Red                                             0.0


Blood Cells #


Sodium Level                                             128  L


Potassium Level                                           4.6


Chloride Level                                            93  L


Carbon Dioxide                                             30


Level


Anion Gap                                                   5


Blood Urea                                                 12


Nitrogen


Creatinine                                              0.42  L


Est Glomerular    
                
              > 60  
        



Filtrat


Rate
mL/min


Glucose Level                                             133


Calcium Level                                            8.2  L


Phosphorus Level                                          4.8


Magnesium Level                                           2.0


Total Bilirubin                                          0.0  L


Direct Bilirubin                                         0.00


Indirect                                                  0.0


Bilirubin


Aspartate Amino   
                
                      25  
  



Transf
(AST/SGOT


)


Alanine           
                
                      31  
  



Aminotransferase



(ALT/SGPT)


Alkaline                                                  34  L


Phosphatase


Total Protein                                            4.4  L


Albumin                                                  2.2  L


Globulin                                                 2.20


Albumin/Globulin                                         1.00


Ratio


Lab Scanned                                                      REFERENCE LAB


Report


Test
               2/15/19
11:45  
              
              



Blood Gas         Blood arterial
  
              
              



Specimen Source



Arterial Blood    2/15/2019
11:43  
              
              



Date Drawn
                :01 AM


Arterial Blood          7.501  H
  
              
              



pH


(Temp
corrected)


Arterial Blood            36.2  
  
              
              



pCO2


(Temp
correct)


Arterial Blood          145.4  H
  
              
              



pO2


(Temp
corrected)


Arterial Blood            27.7  H


HCO3


Arterial Blood             4.3  H


Base Excess


Arterial Blood           98.8  H
  
              
              



Oxygen
Saturatio


n


Praveen Test        ACCEPTAB


Arterial Blood    Right Radial  
  
              
              



Gas


Puncture
Site


Arterial                   0.3  
  
              
              



Blood
Carboxyhem


oglobin


Arterial Blood              0.4


Methemoglobin


Blood Gas A-a O2          26.0  H


Differential


Oxyhemoglobin              98.1


Percent


Blood Gas                  37.0


Temperature


Blood Gas         VENT - CPAP


Modality


FiO2                       30.0


Blood Gas Low               5.0


PEEP Setting


Blood Gas                    10


Pressure Support


Blood Gas         TM


Notified Whom


Blood Gas         2/15/2019
11:51  
              
              



Notified Time
             :43 AM








Medications


Medication





Current Medications


IV Flush (NS 3 ml) 3 ml PER PROTOCOL IV ;  Start 2/4/19 at 03:30


Ondansetron HCl (Zofran Inj) 4 mg Q6H  PRN IV NAUSEA/VOMITING;  Start 2/4/19 at 


03:30


Acetaminophen (Tylenol Tab) 650 mg Q6H  PRN PO .PAIN 1-3 OR TEMP;  Start 2/4/19 


at 03:30


Docusate Sodium (Colace) 100 mg Q12H  PRN PO .CONSTIPATION;  Start 2/4/19 at 


03:30


Magnesium Hydroxide (Milk Of Mag) 30 ml DAILY  PRN PO .CONSTIPATION;  Start 


2/4/19 at 03:30


Heparin Sodium (Porcine) (Heparin  (5000 Units/1ml)) 5,000 unit Q12 SC  Last 


administered on 2/15/19at 09:43; Admin Dose 5,000 UNIT;  Start 2/4/19 at 09:00


Albuterol/ Ipratropium (Duoneb) 3 ml Q4H RESP THERAPY  PRN HHN SHORTNESS OF 


BREATH;  Start 2/4/19 at 03:30


Hydralazine HCl (Apresoline) 10 mg Q6H  PRN IV ELEVATED BLOOD PRESSURE;  Start 


2/4/19 at 03:30


Nitroglycerin (Nitroglycerin (Sl Tab) 0.4 Mg) 1 tab Q5M  PRN SL ANGINA;  Start 


2/4/19 at 03:30


Lorazepam (Ativan) 1 mg Q2H  PRN IV SEIZURES Last administered on 2/4/19at 


03:57; Admin Dose 1 MG;  Start 2/4/19 at 03:30


Miscellaneous Information (Pending Santyl Order For Wound Care) This patient 


ha... PRN  PRN XX WOUND CARE;  Start 2/4/19 at 05:00


Propofol 100 ml @  1.227 mls/ hr Q12H IV ;  Start 2/5/19 at 21:00


Collagenase (Santyl) 1 applic DAILY TOP  Last administered on 2/15/19at 09:25; 


Admin Dose 1 APPLIC;  Start 2/6/19 at 11:30


IV Flush (NS 10 ml) 10 ml PRN  PRN IV IV PROTOCOL;  Start 2/6/19 at 15:00


Cefepime HCl 50 ml @  100 mls/hr Q12 IVPB  Last administered on 2/15/19at 09:25;


Admin Dose 100 MLS/HR;  Start 2/8/19 at 13:00


Levetiracetam 250 mg/Dextrose 102.5 ml @  430 mls/hr Q12 IVPB  Last administered


on 2/15/19at 09:25; Admin Dose 430 MLS/HR;  Start 2/11/19 at 21:00


Lansoprazole (Prevacid) 30 mg DAILY@06 PO  Last administered on 2/15/19at 05:15;


Admin Dose 30 MG;  Start 2/12/19 at 06:00


Norepinephrine 250 ml @  1.875 mls/ hr TITRATE IV ;  Start 2/13/19 at 10:30











MARIA ELENA SIMEON NP               Feb 15, 2019 12:33

## 2019-02-15 NOTE — CONS
Assessment/Plan


Assessment/Plan


Hospital Course (Demo Recall)


No acute events overnight patient looks comfortable off pressors no fevers 


overnight 





WBC 12.1 platelets 352 neutrophils 76.4 BUN 12 creatinine 0.42





Antimicrobials: Cefepime #8





Microbiology: Urine culture grew Streptococcus, sputum culture + pseudomonas 


aeruginosa





Indwelling: Endotracheal tube, orogastric tube Davila catheter left upper 


extremity PICC line





Physical examination: Well-developed chronically ill-appearing middle-aged woman


who is intubated in no distress.  Head atraumatic normocephalic.  Sclera 


nonicteric.  Neck is supple.  Chest rise symmetrical, breath sounds diminished 


bases.  Heart: S1-S2.  Abdomen soft bowel sounds present, hypoactive.  


Extremities cyanotic with trace edema





Assessment:


1.  Resolving sepsis, s/p shock 


2.  Gram-positive cocci bacteremia cw contaminant


3.  Respiratory failure/PNA


4.  New onset seizures


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


5.  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


6.  Status post urinary tract infection





Plan: Remains stable off pressors, continue present care, antibiotics, 


neurology/pulmonary recommendations, family to decide regarding plan of care





Consultation Date/Type/Reason


Admit Date/Time


Feb 3, 2019 at 22:12


Initial Consult Date


2/5/19


Type of Consult


id


Requesting Provider:  LAURENCE CASTELLANO


Date/Time of Note


DATE: 2/15/19 


TIME: 12:50





Exam/Review of Systems


Exam


Vitals





Vital Signs


  Date      Temp  Pulse  Resp  B/P (MAP)   Pulse Ox  O2          O2 Flow    FiO2


Time                                                 Delivery    Rate


   2/15/19           74    17      105/70       100  Mechanical


     11:00                           (82)            Ventilator


   2/15/19                                                                    30


     09:40


   2/15/19  98.8


     08:00








Intake and Output





2/14/19


2/14/19


2/15/19





1414:59


22:59


06:59





IntakeIntake Total


672.5 ml


572.5 ml


470 ml





OutputOutput Total


625 ml


865 ml


550 ml





BalanceBalance


47.5 ml


-292.5 ml


-80 ml














Results


Result Diagram:  


2/15/19 0415                                                                    


           2/15/19 0415





Results 24hrs





Laboratory Tests


Test
               2/14/19
18:02  2/14/19
23:11  2/15/19
04:15    2/15/19
09:17


Creatine Kinase   < 20  L          < 20  L        < 20  L


Creatine Kinase


Index


Creatinine                 1.00           0.86           1.02


Kinase MB (Mass)


Troponin I                0.075          0.091          0.084


White Blood                                             12.1  H


Count


Red Blood Count                                         2.73  L


Hemoglobin                                               8.5  L


Hematocrit                                              25.0  L


Mean Corpuscular                                         91.6


Volume


Mean Corpuscular                                         31.1


Hemoglobin


Mean Corpuscular  
                
                    34.0  
  



Hemoglobin
Carolyn


nt


Red Cell                                                 12.6


Distribution


Width


Platelet Count                                           352  #


Mean Platelet                                             9.9


Volume


Immature                                               0.800  H


Granulocytes %


Neutrophils %                                            76.4


Lymphocytes %                                           11.3  L


Monocytes %                                               8.7


Eosinophils %                                             2.5


Basophils %                                               0.3


Nucleated Red                                             0.0


Blood Cells %


Immature                                               0.100  H


Granulocytes #


Neutrophils #                                            9.3  H


Lymphocytes #                                             1.4


Monocytes #                                              1.1  H


Eosinophils #                                             0.3


Basophils #                                               0.0


Nucleated Red                                             0.0


Blood Cells #


Sodium Level                                             128  L


Potassium Level                                           4.6


Chloride Level                                            93  L


Carbon Dioxide                                             30


Level


Anion Gap                                                   5


Blood Urea                                                 12


Nitrogen


Creatinine                                              0.42  L


Est Glomerular    
                
              > 60  
        



Filtrat


Rate
mL/min


Glucose Level                                             133


Calcium Level                                            8.2  L


Phosphorus Level                                          4.8


Magnesium Level                                           2.0


Total Bilirubin                                          0.0  L


Direct Bilirubin                                         0.00


Indirect                                                  0.0


Bilirubin


Aspartate Amino   
                
                      25  
  



Transf
(AST/SGOT


)


Alanine           
                
                      31  
  



Aminotransferase



(ALT/SGPT)


Alkaline                                                  34  L


Phosphatase


Total Protein                                            4.4  L


Albumin                                                  2.2  L


Globulin                                                 2.20


Albumin/Globulin                                         1.00


Ratio


Lab Scanned                                                      REFERENCE LAB


Report


Test
               2/15/19
11:45  
              
              



Blood Gas         Blood arterial
  
              
              



Specimen Source



Arterial Blood    2/15/2019
11:43  
              
              



Date Drawn
                :01 AM


Arterial Blood          7.501  H
  
              
              



pH


(Temp
corrected)


Arterial Blood            36.2  
  
              
              



pCO2


(Temp
correct)


Arterial Blood          145.4  H
  
              
              



pO2


(Temp
corrected)


Arterial Blood            27.7  H


HCO3


Arterial Blood             4.3  H


Base Excess


Arterial Blood           98.8  H
  
              
              



Oxygen
Saturatio


n


Praveen Test        ACCEPTAB


Arterial Blood    Right Radial  
  
              
              



Gas


Puncture
Site


Arterial                   0.3  
  
              
              



Blood
Carboxyhem


oglobin


Arterial Blood              0.4


Methemoglobin


Blood Gas A-a O2          26.0  H


Differential


Oxyhemoglobin              98.1


Percent


Blood Gas                  37.0


Temperature


Blood Gas         VENT - CPAP


Modality


FiO2                       30.0


Blood Gas Low               5.0


PEEP Setting


Blood Gas                    10


Pressure Support


Blood Gas         TM


Notified Whom


Blood Gas         2/15/2019
11:51  
              
              



Notified Time
             :43 AM








Medications


Medication





Current Medications


IV Flush (NS 3 ml) 3 ml PER PROTOCOL IV ;  Start 2/4/19 at 03:30


Ondansetron HCl (Zofran Inj) 4 mg Q6H  PRN IV NAUSEA/VOMITING;  Start 2/4/19 at 


03:30


Acetaminophen (Tylenol Tab) 650 mg Q6H  PRN PO .PAIN 1-3 OR TEMP;  Start 2/4/19 


at 03:30


Docusate Sodium (Colace) 100 mg Q12H  PRN PO .CONSTIPATION;  Start 2/4/19 at 


03:30


Magnesium Hydroxide (Milk Of Mag) 30 ml DAILY  PRN PO .CONSTIPATION;  Start 


2/4/19 at 03:30


Heparin Sodium (Porcine) (Heparin  (5000 Units/1ml)) 5,000 unit Q12 SC  Last 


administered on 2/15/19at 09:43; Admin Dose 5,000 UNIT;  Start 2/4/19 at 09:00


Albuterol/ Ipratropium (Duoneb) 3 ml Q4H RESP THERAPY  PRN HHN SHORTNESS OF 


BREATH;  Start 2/4/19 at 03:30


Hydralazine HCl (Apresoline) 10 mg Q6H  PRN IV ELEVATED BLOOD PRESSURE;  Start 


2/4/19 at 03:30


Nitroglycerin (Nitroglycerin (Sl Tab) 0.4 Mg) 1 tab Q5M  PRN SL ANGINA;  Start 


2/4/19 at 03:30


Lorazepam (Ativan) 1 mg Q2H  PRN IV SEIZURES Last administered on 2/4/19at 


03:57; Admin Dose 1 MG;  Start 2/4/19 at 03:30


Miscellaneous Information (Pending Santyl Order For Wound Care) This patient 


ha... PRN  PRN XX WOUND CARE;  Start 2/4/19 at 05:00


Propofol 100 ml @  1.227 mls/ hr Q12H IV ;  Start 2/5/19 at 21:00


Collagenase (Santyl) 1 applic DAILY TOP  Last administered on 2/15/19at 09:25; 


Admin Dose 1 APPLIC;  Start 2/6/19 at 11:30


IV Flush (NS 10 ml) 10 ml PRN  PRN IV IV PROTOCOL;  Start 2/6/19 at 15:00


Cefepime HCl 50 ml @  100 mls/hr Q12 IVPB  Last administered on 2/15/19at 09:25;


Admin Dose 100 MLS/HR;  Start 2/8/19 at 13:00


Levetiracetam 250 mg/Dextrose 102.5 ml @  430 mls/hr Q12 IVPB  Last administered


on 2/15/19at 09:25; Admin Dose 430 MLS/HR;  Start 2/11/19 at 21:00


Lansoprazole (Prevacid) 30 mg DAILY@06 PO  Last administered on 2/15/19at 05:15;


Admin Dose 30 MG;  Start 2/12/19 at 06:00


Norepinephrine 250 ml @  1.875 mls/ hr TITRATE IV ;  Start 2/13/19 at 10:30











RUKHSANA ZEPEDA NP            Feb 15, 2019 12:51

## 2019-02-16 VITALS — SYSTOLIC BLOOD PRESSURE: 101 MMHG | HEART RATE: 67 BPM | DIASTOLIC BLOOD PRESSURE: 61 MMHG | RESPIRATION RATE: 14 BRPM

## 2019-02-16 VITALS — HEART RATE: 64 BPM | RESPIRATION RATE: 15 BRPM

## 2019-02-16 VITALS — RESPIRATION RATE: 12 BRPM

## 2019-02-16 VITALS — HEART RATE: 71 BPM | SYSTOLIC BLOOD PRESSURE: 103 MMHG | RESPIRATION RATE: 14 BRPM | DIASTOLIC BLOOD PRESSURE: 69 MMHG

## 2019-02-16 VITALS — HEART RATE: 69 BPM | RESPIRATION RATE: 18 BRPM

## 2019-02-16 VITALS — DIASTOLIC BLOOD PRESSURE: 80 MMHG | RESPIRATION RATE: 15 BRPM | HEART RATE: 71 BPM | SYSTOLIC BLOOD PRESSURE: 124 MMHG

## 2019-02-16 VITALS — RESPIRATION RATE: 18 BRPM | DIASTOLIC BLOOD PRESSURE: 93 MMHG | HEART RATE: 69 BPM | SYSTOLIC BLOOD PRESSURE: 103 MMHG

## 2019-02-16 VITALS — DIASTOLIC BLOOD PRESSURE: 69 MMHG | SYSTOLIC BLOOD PRESSURE: 108 MMHG | HEART RATE: 79 BPM

## 2019-02-16 VITALS — RESPIRATION RATE: 14 BRPM

## 2019-02-16 VITALS — HEART RATE: 65 BPM | SYSTOLIC BLOOD PRESSURE: 111 MMHG | DIASTOLIC BLOOD PRESSURE: 69 MMHG | RESPIRATION RATE: 14 BRPM

## 2019-02-16 VITALS — HEART RATE: 66 BPM | SYSTOLIC BLOOD PRESSURE: 105 MMHG | DIASTOLIC BLOOD PRESSURE: 71 MMHG | RESPIRATION RATE: 14 BRPM

## 2019-02-16 VITALS — HEART RATE: 63 BPM | RESPIRATION RATE: 16 BRPM

## 2019-02-16 VITALS — HEART RATE: 70 BPM | RESPIRATION RATE: 14 BRPM | DIASTOLIC BLOOD PRESSURE: 73 MMHG | SYSTOLIC BLOOD PRESSURE: 115 MMHG

## 2019-02-16 VITALS — RESPIRATION RATE: 19 BRPM | SYSTOLIC BLOOD PRESSURE: 104 MMHG | HEART RATE: 65 BPM | DIASTOLIC BLOOD PRESSURE: 66 MMHG

## 2019-02-16 VITALS — SYSTOLIC BLOOD PRESSURE: 114 MMHG | DIASTOLIC BLOOD PRESSURE: 74 MMHG | HEART RATE: 68 BPM | RESPIRATION RATE: 14 BRPM

## 2019-02-16 VITALS — SYSTOLIC BLOOD PRESSURE: 109 MMHG | DIASTOLIC BLOOD PRESSURE: 69 MMHG | HEART RATE: 68 BPM | RESPIRATION RATE: 14 BRPM

## 2019-02-16 VITALS — HEART RATE: 76 BPM

## 2019-02-16 VITALS — HEART RATE: 67 BPM | SYSTOLIC BLOOD PRESSURE: 121 MMHG | RESPIRATION RATE: 16 BRPM | DIASTOLIC BLOOD PRESSURE: 78 MMHG

## 2019-02-16 VITALS — RESPIRATION RATE: 11 BRPM

## 2019-02-16 VITALS — RESPIRATION RATE: 18 BRPM | HEART RATE: 72 BPM | SYSTOLIC BLOOD PRESSURE: 82 MMHG | DIASTOLIC BLOOD PRESSURE: 49 MMHG

## 2019-02-16 LAB
ADD MAN DIFF?: NO
ALANINE AMINOTRANSFERASE: 26 IU/L (ref 13–69)
ALBUMIN/GLOBULIN RATIO: 0.96
ALBUMIN: 2.5 G/DL (ref 3.3–4.9)
ALKALINE PHOSPHATASE: 31 IU/L (ref 42–121)
ANION GAP: 5 (ref 5–13)
ASPARTATE AMINO TRANSFERASE: 23 IU/L (ref 15–46)
BASOPHIL #: 0.1 10^3/UL (ref 0–0.1)
BASOPHILS %: 0.5 % (ref 0–2)
BILIRUBIN,DIRECT: 0 MG/DL (ref 0–0.2)
BILIRUBIN,TOTAL: 0 MG/DL (ref 0.2–1.3)
BLOOD UREA NITROGEN: 12 MG/DL (ref 7–20)
CALCIUM: 8.3 MG/DL (ref 8.4–10.2)
CARBON DIOXIDE: 28 MMOL/L (ref 21–31)
CHLORIDE: 96 MMOL/L (ref 97–110)
CREATININE: 0.4 MG/DL (ref 0.44–1)
EOSINOPHILS #: 0.2 10^3/UL (ref 0–0.5)
EOSINOPHILS %: 1.8 % (ref 0–7)
GLOBULIN: 2.6 G/DL (ref 1.3–3.2)
GLUCOSE: 117 MG/DL (ref 70–220)
HEMATOCRIT: 23.1 % (ref 37–47)
HEMOGLOBIN: 7.9 G/DL (ref 12–16)
IMMATURE GRANS #M: 0.13 10^3/UL (ref 0–0.03)
IMMATURE GRANS % (M): 1 % (ref 0–0.43)
LYMPHOCYTES #: 1.2 10^3/UL (ref 0.8–2.9)
LYMPHOCYTES %: 9.9 % (ref 15–51)
MAGNESIUM: 1.9 MG/DL (ref 1.7–2.5)
MEAN CORPUSCULAR HEMOGLOBIN: 31.6 PG (ref 29–33)
MEAN CORPUSCULAR HGB CONC: 34.2 G/DL (ref 32–37)
MEAN CORPUSCULAR VOLUME: 92.4 FL (ref 82–101)
MEAN PLATELET VOLUME: 9.6 FL (ref 7.4–10.4)
MONOCYTE #: 1.1 10^3/UL (ref 0.3–0.9)
MONOCYTES %: 8.5 % (ref 0–11)
NEUTROPHIL #: 9.8 10^3/UL (ref 1.6–7.5)
NEUTROPHILS %: 78.3 % (ref 39–77)
NUCLEATED RED BLOOD CELLS #: 0 10^3/UL (ref 0–0)
NUCLEATED RED BLOOD CELLS%: 0 /100WBC (ref 0–0)
PHENOBARBITAL: 7.9 MG/L (ref 15–40)
PHOSPHORUS: 4.3 MG/DL (ref 2.5–4.9)
PLATELET COUNT: 343 10^3/UL (ref 140–415)
POTASSIUM: 4.5 MMOL/L (ref 3.5–5.1)
RED BLOOD COUNT: 2.5 10^6/UL (ref 4.2–5.4)
RED CELL DISTRIBUTION WIDTH: 12.6 % (ref 11.5–14.5)
SODIUM: 129 MMOL/L (ref 135–144)
TOTAL PROTEIN: 5.1 G/DL (ref 6.1–8.1)
VZV IGM SER IA-ACNC: 0.53
VZV IGM SER IA-ACNC: 0.53
WHITE BLOOD COUNT: 12.5 10^3/UL (ref 4.8–10.8)

## 2019-02-16 RX ADMIN — Medication 1 MLS/HR: at 15:27

## 2019-02-16 RX ADMIN — LEVETIRACETAM 1 MLS/HR: 100 INJECTION, SOLUTION INTRAVENOUS at 10:03

## 2019-02-16 RX ADMIN — LANSOPRAZOLE SCH MG: 30 CAPSULE, DELAYED RELEASE PELLETS ORAL at 05:30

## 2019-02-16 RX ADMIN — Medication SCH MLS/HR: at 15:27

## 2019-02-16 RX ADMIN — MORPHINE SULFATE 1 MG: 2 INJECTION, SOLUTION INTRAMUSCULAR; INTRAVENOUS at 15:16

## 2019-02-16 RX ADMIN — CASTOR OIL AND BALSAM, PERU 1 APPLIC: 788; 87 OINTMENT TOPICAL at 03:41

## 2019-02-16 RX ADMIN — CEFEPIME 1 MLS/HR: 1 INJECTION, POWDER, FOR SOLUTION INTRAMUSCULAR; INTRAVENOUS at 09:00

## 2019-02-16 RX ADMIN — PROPOFOL SCH MLS/HR: 10 INJECTION, EMULSION INTRAVENOUS at 09:00

## 2019-02-16 RX ADMIN — COLLAGENASE SANTYL SCH APPLIC: 250 OINTMENT TOPICAL at 09:00

## 2019-02-16 RX ADMIN — LORAZEPAM 1 MG: 2 INJECTION, SOLUTION INTRAMUSCULAR; INTRAVENOUS at 15:15

## 2019-02-16 RX ADMIN — PROPOFOL 1 MLS/HR: 10 INJECTION, EMULSION INTRAVENOUS at 09:00

## 2019-02-16 RX ADMIN — SCOPALAMINE 1 PATCH: 1 PATCH, EXTENDED RELEASE TRANSDERMAL at 17:13

## 2019-02-16 RX ADMIN — LANSOPRAZOLE 1 MG: 30 CAPSULE, DELAYED RELEASE PELLETS ORAL at 05:30

## 2019-02-16 RX ADMIN — LEVETIRACETAM SCH MLS/HR: 100 INJECTION, SOLUTION INTRAVENOUS at 10:03

## 2019-02-16 RX ADMIN — COLLAGENASE SANTYL 1 APPLIC: 250 OINTMENT TOPICAL at 09:00

## 2019-02-16 RX ADMIN — HEPARIN SODIUM SCH UNIT: 5000 INJECTION, SOLUTION INTRAVENOUS; SUBCUTANEOUS at 09:00

## 2019-02-16 RX ADMIN — CEFEPIME SCH MLS/HR: 1 INJECTION, POWDER, FOR SOLUTION INTRAMUSCULAR; INTRAVENOUS at 09:00

## 2019-02-16 RX ADMIN — HEPARIN SODIUM 1 UNIT: 5000 INJECTION, SOLUTION INTRAVENOUS; SUBCUTANEOUS at 09:00

## 2019-02-16 RX ADMIN — CASTOR OIL AND BALSAM, PERU 1 APPLIC: 788; 87 OINTMENT TOPICAL at 09:00

## 2019-02-16 NOTE — CONS
Assessment/Plan


Assessment/Plan


Hospital Course (Demo Recall)


ID PROGRESS NOTE


CURRENT ABX: DAY #  => Cefepime #9


2/16/19 0450                                                                    


           2/16/19 0450


24H INTERVAL SUMMARY


* Clinically stable, family present, no fevers, resting == PLAN IS FOR TRANSFER 


  TO HOSPICE 


* Indwelling: Endotracheal tube, orogastric tube Davila catheter left upper 


  extremity PICC line





MICRO/OTHER


* 02/10/19 BCx (-) 


* 02/07/19 RESPIRATORY CULTURE  Final  


     Organism 1                     PSEUDOMONAS AERUGINOSA


        QUANTITY                    3+





                                    P.AERUG          


                                    M.I.C.    RX     


                                    --------- ---    


     AMIKACIN                       <=2        S     


     AZTREONAM                                 I     


     CEFEPIME                       2          S     


     CEFTAZIDIME                    4          S     


     CIPROFLOXACIN                  <=0.25     S     


     GENTAMICIN                     <=1        S     


     LEVOFLOXACIN                   0.5        S     


     TOBRAMYCIN                     <=1        S     


     PIPERACILLIN/TAZOBACTAM                   S     





* 2/6/19 Urine Cx URINE CULTURE  Final  


     Organism 1                     STREPTOCOCCUS NON TYPEABLE


        COLONY COUNT                10,000 - 20,000 CFU/ml





        Beta Streptococcus not Group A maybe a potential pathogen





* 2/6/19 BCX (+)CoNS = contaminant -- repeat BCx (-) 








PHYSICAL EXAMINATION:


GENERAL: Afebrile, VSS, non-communicative 


HEENT: AT, NC, anicteric, orally intubated 


NECK:  Supple, trach midline 


CHEST: Equal chest rise bilaterally, without dyspnea on observation 


HEART:  Pulse RRR


ABDOMEN:  Soft / NT 


EXTREMITIES:  Warm, dry


SKIN: No rash, no diaphoresis 





ID ASSESSMENT


59 yo F admit with: 


1.  Resolving sepsis, s/p shock 


2.  Gram-positive cocci bacteremia cw contaminant


3.  Respiratory failure/PNA


4.  New onset seizures


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


5.  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


6.  Status post urinary tract infection


(-)MRSA Nares 


ABX ALLERGIES: KNDA 


INVASIVES: PIV


CURRENT ABX: DAY #  => Cefepime #9





ID RECOMMENDATIONS/PLAN:  


1. == PLAN IS FOR TRANSFER TO HOSPICE FOR TERMINAL EXTUBATION 


.





Consultation Date/Type/Reason


Admit Date/Time


Feb 3, 2019 at 22:12


Initial Consult Date


2/5/19


Requesting Provider:  LAURENCE CASTELLANO


Date/Time of Note


DATE: 2/16/19 


TIME: 10:14





Exam/Review of Systems


Exam


Vitals





Vital Signs


  Date      Temp  Pulse  Resp  B/P (MAP)   Pulse Ox  O2          O2 Flow    FiO2


Time                                                 Delivery    Rate


   2/16/19           70    14                   100                           30


     05:10


   2/16/19                         115/73            Mechanical


     05:00                           (87)            Ventilator


   2/16/19  98.6


     04:00








Intake and Output





2/15/19


2/15/19


2/16/19





1515:00


23:00


07:00





IntakeIntake Total


472.5 ml


350 ml


120 ml





OutputOutput Total


475 ml


435 ml


175 ml





BalanceBalance


-2.5 ml


-85 ml


-55 ml














Results


Result Diagram:  


2/16/19 0450                                                                    


           2/16/19 0450





Results 24hrs





Laboratory Tests


   Test
                                        2/15/19
11:45  2/16/19
04:50


   Blood Gas Specimen Source
           Blood arterial
        



   Arterial Blood Date Drawn
           2/15/2019
11:43:01 AM  



   Arterial Blood pH (Temp
corrected)               7.501  H
  



   Arterial Blood pCO2 (Temp
correct)                 36.2  
  



   Arterial Blood pO2 (Temp
corrected)              145.4  H
  



   Arterial Blood HCO3                                27.7  H


   Arterial Blood Base Excess                          4.3  H


   Arterial Blood Oxygen
Saturation                  98.8  H
  



   Praveen Test                           ACCEPTAB


   Arterial Blood Gas Puncture
Site     Right Radial  
        



   Arterial Blood
Carboxyhemoglobin                    0.3  
  



   Arterial Blood Methemoglobin                         0.4


   Blood Gas A-a O2 Differential                      26.0  H


   Oxyhemoglobin Percent                               98.1


   Blood Gas Temperature                               37.0


   Blood Gas Modality                   VENT - CPAP


   FiO2                                                30.0


   Blood Gas Low PEEP Setting                           5.0


   Blood Gas Pressure Support                            10


   Blood Gas Notified Whom              TM


   Blood Gas Notified Time
             2/15/2019
11:51:43 AM  



   White Blood Count                                                 12.5  H


   Red Blood Count                                                   2.50  L


   Hemoglobin                                                         7.9  L


   Hematocrit                                                        23.1  L


   Mean Corpuscular Volume                                            92.4


   Mean Corpuscular Hemoglobin                                        31.6


   Mean Corpuscular Hemoglobin
Concent  
                            34.2  



   Red Cell Distribution Width                                        12.6


   Platelet Count                                                      343


   Mean Platelet Volume                                                9.6


   Immature Granulocytes %                                          1.000  H


   Neutrophils %                                                     78.3  H


   Lymphocytes %                                                      9.9  L


   Monocytes %                                                         8.5


   Eosinophils %                                                       1.8


   Basophils %                                                         0.5


   Nucleated Red Blood Cells %                                         0.0


   Immature Granulocytes #                                          0.130  H


   Neutrophils #                                                      9.8  H


   Lymphocytes #                                                       1.2


   Monocytes #                                                        1.1  H


   Eosinophils #                                                       0.2


   Basophils #                                                         0.1


   Nucleated Red Blood Cells #                                         0.0


   Sodium Level                                                       129  L


   Potassium Level                                                     4.5


   Chloride Level                                                      96  L


   Carbon Dioxide Level                                                 28


   Anion Gap                                                             5


   Blood Urea Nitrogen                                                  12


   Creatinine                                                        0.40  L


   Est Glomerular Filtrat Rate
mL/min   
                      > 60  



   Glucose Level                                                       117


   Calcium Level                                                      8.3  L


   Phosphorus Level                                                    4.3


   Magnesium Level                                                     1.9


   Total Bilirubin                                                    0.0  L


   Direct Bilirubin                                                   0.00


   Indirect Bilirubin                                                  0.0


   Aspartate Amino Transf
(AST/SGOT)    
                              23  



   Alanine Aminotransferase
(ALT/SGPT)  
                              26  



   Alkaline Phosphatase                                                31  L


   Total Protein                                                      5.1  L


   Albumin                                                            2.5  L


   Globulin                                                           2.60


   Albumin/Globulin Ratio                                             0.96








Medications


Medication





Current Medications


IV Flush (NS 3 ml) 3 ml PER PROTOCOL IV ;  Start 2/4/19 at 03:30


Ondansetron HCl (Zofran Inj) 4 mg Q6H  PRN IV NAUSEA/VOMITING;  Start 2/4/19 at 


03:30


Acetaminophen (Tylenol Tab) 650 mg Q6H  PRN PO .PAIN 1-3 OR TEMP;  Start 2/4/19 


at 03:30


Docusate Sodium (Colace) 100 mg Q12H  PRN PO .CONSTIPATION;  Start 2/4/19 at 03


:30


Magnesium Hydroxide (Milk Of Mag) 30 ml DAILY  PRN PO .CONSTIPATION;  Start 


2/4/19 at 03:30


Heparin Sodium (Porcine) (Heparin  (5000 Units/1ml)) 5,000 unit Q12 SC  Last 


administered on 2/15/19at 20:55; Admin Dose 5,000 UNIT;  Start 2/4/19 at 09:00


Albuterol/ Ipratropium (Duoneb) 3 ml Q4H RESP THERAPY  PRN HHN SHORTNESS OF 


BREATH;  Start 2/4/19 at 03:30


Hydralazine HCl (Apresoline) 10 mg Q6H  PRN IV ELEVATED BLOOD PRESSURE;  Start 


2/4/19 at 03:30


Nitroglycerin (Nitroglycerin (Sl Tab) 0.4 Mg) 1 tab Q5M  PRN SL ANGINA;  Start 


2/4/19 at 03:30


Lorazepam (Ativan) 1 mg Q2H  PRN IV SEIZURES Last administered on 2/4/19at 


03:57; Admin Dose 1 MG;  Start 2/4/19 at 03:30


Miscellaneous Information (Pending Santyl Order For Wound Care) This patient 


ha... PRN  PRN XX WOUND CARE;  Start 2/4/19 at 05:00


Collagenase (Santyl) 1 applic DAILY TOP  Last administered on 2/15/19at 09:25; 


Admin Dose 1 APPLIC;  Start 2/6/19 at 11:30


IV Flush (NS 10 ml) 10 ml PRN  PRN IV IV PROTOCOL;  Start 2/6/19 at 15:00


Cefepime HCl 50 ml @  100 mls/hr Q12 IVPB  Last administered on 2/15/19at 20:54;


Admin Dose 100 MLS/HR;  Start 2/8/19 at 13:00


Levetiracetam 250 mg/Dextrose 102.5 ml @  430 mls/hr Q12 IVPB  Last administered


on 2/16/19at 10:03; Admin Dose 430 MLS/HR;  Start 2/11/19 at 21:00


Lansoprazole (Prevacid) 30 mg DAILY@06 PO  Last administered on 2/16/19at 05:30;


Admin Dose 30 MG;  Start 2/12/19 at 06:00


Norepinephrine 250 ml @  1.875 mls/ hr TITRATE IV ;  Start 2/13/19 at 10:30











CLARK RHOADES NP              Feb 16, 2019 10:14

## 2019-02-16 NOTE — CONS
Assessment/Plan


Assessment/Plan


Hospital Course


57 yo F with reported Hx of West Nile Virus  c/b chronic encephalopathy who p/w 


ams.


She was additionally reported to have a seizure... for which neurology is consu


lted.





Most clinically consistent with new onset epilepsy...with likely subtle seizures


that were undiagnosed over a longer period..





Initial EEG was notable for GPEDs and an electrographic seizure originating in 


the R temporal region..


s/p Dilantin load, with ongoing episodic staring spells (presumed seizures) 


noted clinically..


s/p Phenobarbital load on 2/5..with resolution of episodes suspicious for 


seizure








P:


Goals of care per primary 


Continue Keppra bid for now; 250mg..


Will follow clinically





Consultation Date/Type/Reason


Admit Date/Time


Feb 3, 2019 at 22:12


Type of Consult


Neurology


Requesting Provider:  LAURENCE CASTELLANO


Date/Time of Note


DATE: 2/16/19 


TIME: 10:54





24 HR Interval Summary


Free Text/Dictation


Continues icu care


Subjective hx not possible:  pt non-verbal





Exam


Vital Signs


Vitals





Vital Signs


  Date      Temp  Pulse  Resp  B/P (MAP)   Pulse Ox  O2          O2 Flow    FiO2


Time                                                 Delivery    Rate


   2/16/19           70    14                   100                           30


     05:10


   2/16/19                         115/73            Mechanical


     05:00                           (87)            Ventilator


   2/16/19  98.6


     04:00








Intake and Output





2/15/19


2/15/19


2/16/19





1515:00


23:00


07:00





IntakeIntake Total


472.5 ml


350 ml


120 ml





OutputOutput Total


475 ml


435 ml


175 ml





BalanceBalance


-2.5 ml


-85 ml


-55 ml














Exam


PE:


Gen Appearance: No Apparent Distress


HEENT: Intubated


Cardiovascular: Regular rate


Abdomen: Soft


Extremities: Dry





NE:


The patient was awake, though nonverbal.





Cranial nerve examination was limited by mental status. Pupils were equal and 


reactive to light. There was no afferent pupillary defect. Funduscopic 


examination was limited. Face was grossly symmetric, w/ present corneal and 


cough reflexes.





Tone was normal. Muscle bulk was normal. I did not see fasciculations. The 


patient withdrew to noxious stimulation x 4.





Coordination and gait testing was limited by mental status.





Arm and leg reflexes were within normal limits and symmetric. Richmond's sign was


absent. Plantar responses were flexor.











IDOKO,JOSE                 Feb 16, 2019 10:57

## 2019-02-16 NOTE — CONS
Assessment/Plan


Assessment/Plan


Assessment/Plan (Daily)


Discussed ongoing level of care with the patient's has been on February 15, 


2019.  It is his opinion that patient has no quality of life and he does not 


want to have G-tube was placed for patient to remain on artificial life support 


including artificial nutrition or ventilation if there is no chance that she 


will significantly recovered.  It is own words he felt that she has suffered 


enough.  Patient's  was given options for ongoing level of care and he 


has decided to discontinue artificial ventilation as long as this is clearly 


evident that patient will not breathe on her own without backup ventilator 


support.  Today Dr. Hoffman will begin weaning patient once again.  Follow-up 


note after weaning trial today.  Close contact with family members will be done.





Consultation Date/Type/Reason


Admit Date/Time


Feb 3, 2019 at 22:12


Date/Time of Note


DATE: 2/16/19 


TIME: 09:18





Past Medical History


Home Meds


Reported Medications


Cyanocobalamin* (Vitamin B-12*) 50 Mcg Tablet, 50 MCG PO DAILY, TAB


   2/4/19


Amlodipine Besylate* (Amlodipine Besylate*) 2.5 Mg Tablet, 5 MG PO DAILY, #30 


TAB


   2/4/19


Risperidone* (Risperidone*) 0.5 Mg Tablet, 0.5 MG PO DAILY, TAB


   2/4/19


Lamotrigine* (Lamotrigine*) 25 Mg Tablet, 25 MG PO DAILY, TAB


   2/4/19


Benztropine Mesylate* (Benztropine Mesylate*) 2 Mg Tablet, 2 MG PO BID, TAB


   2/4/19


Medications





Current Medications


IV Flush (NS 3 ml) 3 ml PER PROTOCOL IV ;  Start 2/4/19 at 03:30


Ondansetron HCl (Zofran Inj) 4 mg Q6H  PRN IV NAUSEA/VOMITING;  Start 2/4/19 at 


03:30


Acetaminophen (Tylenol Tab) 650 mg Q6H  PRN PO .PAIN 1-3 OR TEMP;  Start 2/4/19 


at 03:30


Docusate Sodium (Colace) 100 mg Q12H  PRN PO .CONSTIPATION;  Start 2/4/19 at 0


3:30


Magnesium Hydroxide (Milk Of Mag) 30 ml DAILY  PRN PO .CONSTIPATION;  Start 


2/4/19 at 03:30


Heparin Sodium (Porcine) (Heparin  (5000 Units/1ml)) 5,000 unit Q12 SC  Last 


administered on 2/15/19at 20:55; Admin Dose 5,000 UNIT;  Start 2/4/19 at 09:00


Albuterol/ Ipratropium (Duoneb) 3 ml Q4H RESP THERAPY  PRN HHN SHORTNESS OF 


BREATH;  Start 2/4/19 at 03:30


Hydralazine HCl (Apresoline) 10 mg Q6H  PRN IV ELEVATED BLOOD PRESSURE;  Start 


2/4/19 at 03:30


Nitroglycerin (Nitroglycerin (Sl Tab) 0.4 Mg) 1 tab Q5M  PRN SL ANGINA;  Start 


2/4/19 at 03:30


Lorazepam (Ativan) 1 mg Q2H  PRN IV SEIZURES Last administered on 2/4/19at 


03:57; Admin Dose 1 MG;  Start 2/4/19 at 03:30


Miscellaneous Information (Pending Santyl Order For Wound Care) This patient 


ha... PRN  PRN XX WOUND CARE;  Start 2/4/19 at 05:00


Propofol 100 ml @  1.227 mls/ hr Q12H IV ;  Start 2/5/19 at 21:00


Collagenase (Santyl) 1 applic DAILY TOP  Last administered on 2/15/19at 09:25; 


Admin Dose 1 APPLIC;  Start 2/6/19 at 11:30


IV Flush (NS 10 ml) 10 ml PRN  PRN IV IV PROTOCOL;  Start 2/6/19 at 15:00


Cefepime HCl 50 ml @  100 mls/hr Q12 IVPB  Last administered on 2/15/19at 20:54;


Admin Dose 100 MLS/HR;  Start 2/8/19 at 13:00


Levetiracetam 250 mg/Dextrose 102.5 ml @  430 mls/hr Q12 IVPB  Last administered


on 2/15/19at 20:54; Admin Dose 430 MLS/HR;  Start 2/11/19 at 21:00


Lansoprazole (Prevacid) 30 mg DAILY@06 PO  Last administered on 2/16/19at 05:30;


Admin Dose 30 MG;  Start 2/12/19 at 06:00


Norepinephrine 250 ml @  1.875 mls/ hr TITRATE IV ;  Start 2/13/19 at 10:30


Allergies:  


Coded Allergies:  


     No Known Allergy (Unverified , 2/4/19)





Past Surgical History


Past Surgical Hx:  other





Social History


Alcohol Use:  none


Smoking Status:  Never smoker


Drug Use:  none





Exam/Review of Systems


Exam


Vitals





Vital Signs


  Date      Temp  Pulse  Resp  B/P (MAP)   Pulse Ox  O2          O2 Flow    FiO2


Time                                                 Delivery    Rate


   2/16/19           70    14                   100                           30


     05:10


   2/16/19                         115/73            Mechanical


     05:00                           (87)            Ventilator


   2/16/19  98.6


     04:00








Intake and Output





2/15/19


2/15/19


2/16/19





1515:00


23:00


07:00





IntakeIntake Total


472.5 ml


350 ml


120 ml





OutputOutput Total


475 ml


435 ml


175 ml





BalanceBalance


-2.5 ml


-85 ml


-55 ml














Results


Result Diagram:  


2/16/19 0450                                                                    


           2/16/19 0450





Results 24hrs





Laboratory Tests


   Test
                                        2/15/19
11:45  2/16/19
04:50


   Blood Gas Specimen Source
           Blood arterial
        



   Arterial Blood Date Drawn
           2/15/2019
11:43:01 AM  



   Arterial Blood pH (Temp
corrected)               7.501  H
  



   Arterial Blood pCO2 (Temp
correct)                 36.2  
  



   Arterial Blood pO2 (Temp
corrected)              145.4  H
  



   Arterial Blood HCO3                                27.7  H


   Arterial Blood Base Excess                          4.3  H


   Arterial Blood Oxygen
Saturation                  98.8  H
  



   Praveen Test                           ACCEPTAB


   Arterial Blood Gas Puncture
Site     Right Radial  
        



   Arterial Blood
Carboxyhemoglobin                    0.3  
  



   Arterial Blood Methemoglobin                         0.4


   Blood Gas A-a O2 Differential                      26.0  H


   Oxyhemoglobin Percent                               98.1


   Blood Gas Temperature                               37.0


   Blood Gas Modality                   VENT - CPAP


   FiO2                                                30.0


   Blood Gas Low PEEP Setting                           5.0


   Blood Gas Pressure Support                            10


   Blood Gas Notified Whom              TM


   Blood Gas Notified Time
             2/15/2019
11:51:43 AM  



   White Blood Count                                                 12.5  H


   Red Blood Count                                                   2.50  L


   Hemoglobin                                                         7.9  L


   Hematocrit                                                        23.1  L


   Mean Corpuscular Volume                                            92.4


   Mean Corpuscular Hemoglobin                                        31.6


   Mean Corpuscular Hemoglobin
Concent  
                            34.2  



   Red Cell Distribution Width                                        12.6


   Platelet Count                                                      343


   Mean Platelet Volume                                                9.6


   Immature Granulocytes %                                          1.000  H


   Neutrophils %                                                     78.3  H


   Lymphocytes %                                                      9.9  L


   Monocytes %                                                         8.5


   Eosinophils %                                                       1.8


   Basophils %                                                         0.5


   Nucleated Red Blood Cells %                                         0.0


   Immature Granulocytes #                                          0.130  H


   Neutrophils #                                                      9.8  H


   Lymphocytes #                                                       1.2


   Monocytes #                                                        1.1  H


   Eosinophils #                                                       0.2


   Basophils #                                                         0.1


   Nucleated Red Blood Cells #                                         0.0


   Sodium Level                                                       129  L


   Potassium Level                                                     4.5


   Chloride Level                                                      96  L


   Carbon Dioxide Level                                                 28


   Anion Gap                                                             5


   Blood Urea Nitrogen                                                  12


   Creatinine                                                        0.40  L


   Est Glomerular Filtrat Rate
mL/min   
                      > 60  



   Glucose Level                                                       117


   Calcium Level                                                      8.3  L


   Total Bilirubin                                                    0.0  L


   Direct Bilirubin                                                   0.00


   Indirect Bilirubin                                                  0.0


   Aspartate Amino Transf
(AST/SGOT)    
                              23  



   Alanine Aminotransferase
(ALT/SGPT)  
                              26  



   Alkaline Phosphatase                                                31  L


   Total Protein                                                      5.1  L


   Albumin                                                            2.5  L


   Globulin                                                           2.60


   Albumin/Globulin Ratio                                             0.96








Medications


Medication





Current Medications


IV Flush (NS 3 ml) 3 ml PER PROTOCOL IV ;  Start 2/4/19 at 03:30


Ondansetron HCl (Zofran Inj) 4 mg Q6H  PRN IV NAUSEA/VOMITING;  Start 2/4/19 at 


03:30


Acetaminophen (Tylenol Tab) 650 mg Q6H  PRN PO .PAIN 1-3 OR TEMP;  Start 2/4/19 


at 03:30


Docusate Sodium (Colace) 100 mg Q12H  PRN PO .CONSTIPATION;  Start 2/4/19 at 


03:30


Magnesium Hydroxide (Milk Of Mag) 30 ml DAILY  PRN PO .CONSTIPATION;  Start 


2/4/19 at 03:30


Heparin Sodium (Porcine) (Heparin  (5000 Units/1ml)) 5,000 unit Q12 SC  Last 


administered on 2/15/19at 20:55; Admin Dose 5,000 UNIT;  Start 2/4/19 at 09:00


Albuterol/ Ipratropium (Duoneb) 3 ml Q4H RESP THERAPY  PRN HHN SHORTNESS OF 


BREATH;  Start 2/4/19 at 03:30


Hydralazine HCl (Apresoline) 10 mg Q6H  PRN IV ELEVATED BLOOD PRESSURE;  Start 


2/4/19 at 03:30


Nitroglycerin (Nitroglycerin (Sl Tab) 0.4 Mg) 1 tab Q5M  PRN SL ANGINA;  Start 


2/4/19 at 03:30


Lorazepam (Ativan) 1 mg Q2H  PRN IV SEIZURES Last administered on 2/4/19at 


03:57; Admin Dose 1 MG;  Start 2/4/19 at 03:30


Miscellaneous Information (Pending Santyl Order For Wound Care) This patient 


ha... PRN  PRN XX WOUND CARE;  Start 2/4/19 at 05:00


Propofol 100 ml @  1.227 mls/ hr Q12H IV ;  Start 2/5/19 at 21:00


Collagenase (Santyl) 1 applic DAILY TOP  Last administered on 2/15/19at 09:25; 


Admin Dose 1 APPLIC;  Start 2/6/19 at 11:30


IV Flush (NS 10 ml) 10 ml PRN  PRN IV IV PROTOCOL;  Start 2/6/19 at 15:00


Cefepime HCl 50 ml @  100 mls/hr Q12 IVPB  Last administered on 2/15/19at 20:54;


Admin Dose 100 MLS/HR;  Start 2/8/19 at 13:00


Levetiracetam 250 mg/Dextrose 102.5 ml @  430 mls/hr Q12 IVPB  Last administered


on 2/15/19at 20:54; Admin Dose 430 MLS/HR;  Start 2/11/19 at 21:00


Lansoprazole (Prevacid) 30 mg DAILY@06 PO  Last administered on 2/16/19at 05:30;


Admin Dose 30 MG;  Start 2/12/19 at 06:00


Norepinephrine 250 ml @  1.875 mls/ hr TITRATE IV ;  Start 2/13/19 at 10:30











BRENDEN SORIANO             Feb 16, 2019 09:25

## 2019-02-16 NOTE — CONS
Consult Date/Type/Reason


Admit Date/Time


Feb 3, 2019 at 22:12


Initial Consult Date


2/5/19


Type of Consult


Pulmonary


Requesting Provider:  LAURENCE CASTELLANO


Date/Time of Note


DATE: 2/16/19 


TIME: 10:00





Subjective


Patient remains largely somnolent on mechanical ventilation.  Occasionally opens


eyes but not following commands.





Objective





Vital Signs


  Date      Temp  Pulse  Resp  B/P (MAP)   Pulse Ox  O2          O2 Flow    FiO2


Time                                                 Delivery    Rate


   2/16/19           70    14                   100                           30


     05:10


   2/16/19                         115/73            Mechanical


     05:00                           (87)            Ventilator


   2/16/19  98.6


     04:00








Intake and Output





2/15/19


2/15/19


2/16/19





1414:59


22:59


06:59





IntakeIntake Total


472.5 ml


350 ml


160 ml





OutputOutput Total


475 ml


385 ml


225 ml





BalanceBalance


-2.5 ml


-35 ml


-65 ml











Exam


GENERAL: Chronically ill-appearing lady on mechanical ventilation


VITAL SIGNS:  per chart


NECK:  Supple.  No JVD or lymphadenopathy.


CARDIAC EXAM:  S1, S2. No added sounds or murmurs.


CHEST:  clear bilaterally, No added sounds, rales or wheezes


ABDOMEN:  Soft, nontender.  No guarding or rebound.


EXTREMITIES:  No cyanosis, clubbing or edema.


NEUROLOGIC:  Generalized weakness.  Unable to assess





Vent Setting


Ventilator Support Mode:  AC


Fraction of Inspired Oxygen pe:  30


Positive End Expiratory Pressu:  5.0





Results/Medications


Result Diagram:  


2/16/19 0450                                                                    


           2/16/19 0450





Results 24 hrs





Laboratory Tests


   Test
                                        2/15/19
11:45  2/16/19
04:50


   Blood Gas Specimen Source
           Blood arterial
        



   Arterial Blood Date Drawn
           2/15/2019
11:43:01 AM  



   Arterial Blood pH (Temp
corrected)               7.501  H
  



   Arterial Blood pCO2 (Temp
correct)                 36.2  
  



   Arterial Blood pO2 (Temp
corrected)              145.4  H
  



   Arterial Blood HCO3                                27.7  H


   Arterial Blood Base Excess                          4.3  H


   Arterial Blood Oxygen
Saturation                  98.8  H
  



   Praveen Test                           ACCEPTAB


   Arterial Blood Gas Puncture
Site     Right Radial  
        



   Arterial Blood
Carboxyhemoglobin                    0.3  
  



   Arterial Blood Methemoglobin                         0.4


   Blood Gas A-a O2 Differential                      26.0  H


   Oxyhemoglobin Percent                               98.1


   Blood Gas Temperature                               37.0


   Blood Gas Modality                   VENT - CPAP


   FiO2                                                30.0


   Blood Gas Low PEEP Setting                           5.0


   Blood Gas Pressure Support                            10


   Blood Gas Notified Whom              TM


   Blood Gas Notified Time
             2/15/2019
11:51:43 AM  



   White Blood Count                                                 12.5  H


   Red Blood Count                                                   2.50  L


   Hemoglobin                                                         7.9  L


   Hematocrit                                                        23.1  L


   Mean Corpuscular Volume                                            92.4


   Mean Corpuscular Hemoglobin                                        31.6


   Mean Corpuscular Hemoglobin
Concent  
                            34.2  



   Red Cell Distribution Width                                        12.6


   Platelet Count                                                      343


   Mean Platelet Volume                                                9.6


   Immature Granulocytes %                                          1.000  H


   Neutrophils %                                                     78.3  H


   Lymphocytes %                                                      9.9  L


   Monocytes %                                                         8.5


   Eosinophils %                                                       1.8


   Basophils %                                                         0.5


   Nucleated Red Blood Cells %                                         0.0


   Immature Granulocytes #                                          0.130  H


   Neutrophils #                                                      9.8  H


   Lymphocytes #                                                       1.2


   Monocytes #                                                        1.1  H


   Eosinophils #                                                       0.2


   Basophils #                                                         0.1


   Nucleated Red Blood Cells #                                         0.0


   Sodium Level                                                       129  L


   Potassium Level                                                     4.5


   Chloride Level                                                      96  L


   Carbon Dioxide Level                                                 28


   Anion Gap                                                             5


   Blood Urea Nitrogen                                                  12


   Creatinine                                                        0.40  L


   Est Glomerular Filtrat Rate
mL/min   
                      > 60  



   Glucose Level                                                       117


   Calcium Level                                                      8.3  L


   Phosphorus Level                                                    4.3


   Magnesium Level                                                     1.9


   Total Bilirubin                                                    0.0  L


   Direct Bilirubin                                                   0.00


   Indirect Bilirubin                                                  0.0


   Aspartate Amino Transf
(AST/SGOT)    
                              23  



   Alanine Aminotransferase
(ALT/SGPT)  
                              26  



   Alkaline Phosphatase                                                31  L


   Total Protein                                                      5.1  L


   Albumin                                                            2.5  L


   Globulin                                                           2.60


   Albumin/Globulin Ratio                                             0.96





Medications





Current Medications


IV Flush (NS 3 ml) 3 ml PER PROTOCOL IV ;  Start 2/4/19 at 03:30


Ondansetron HCl (Zofran Inj) 4 mg Q6H  PRN IV NAUSEA/VOMITING;  Start 2/4/19 at 


03:30


Acetaminophen (Tylenol Tab) 650 mg Q6H  PRN PO .PAIN 1-3 OR TEMP;  Start 2/4/19 


at 03:30


Docusate Sodium (Colace) 100 mg Q12H  PRN PO .CONSTIPATION;  Start 2/4/19 at 


03:30


Magnesium Hydroxide (Milk Of Mag) 30 ml DAILY  PRN PO .CONSTIPATION;  Start 


2/4/19 at 03:30


Heparin Sodium (Porcine) (Heparin  (5000 Units/1ml)) 5,000 unit Q12 SC  Last 


administered on 2/15/19at 20:55; Admin Dose 5,000 UNIT;  Start 2/4/19 at 09:00


Albuterol/ Ipratropium (Duoneb) 3 ml Q4H RESP THERAPY  PRN HHN SHORTNESS OF 


BREATH;  Start 2/4/19 at 03:30


Hydralazine HCl (Apresoline) 10 mg Q6H  PRN IV ELEVATED BLOOD PRESSURE;  Start 


2/4/19 at 03:30


Nitroglycerin (Nitroglycerin (Sl Tab) 0.4 Mg) 1 tab Q5M  PRN SL ANGINA;  Start 


2/4/19 at 03:30


Lorazepam (Ativan) 1 mg Q2H  PRN IV SEIZURES Last administered on 2/4/19at 


03:57; Admin Dose 1 MG;  Start 2/4/19 at 03:30


Miscellaneous Information (Pending Santyl Order For Wound Care) This patient 


ha... PRN  PRN XX WOUND CARE;  Start 2/4/19 at 05:00


Collagenase (Santyl) 1 applic DAILY TOP  Last administered on 2/15/19at 09:25; 


Admin Dose 1 APPLIC;  Start 2/6/19 at 11:30


IV Flush (NS 10 ml) 10 ml PRN  PRN IV IV PROTOCOL;  Start 2/6/19 at 15:00


Cefepime HCl 50 ml @  100 mls/hr Q12 IVPB  Last administered on 2/15/19at 20:54;


Admin Dose 100 MLS/HR;  Start 2/8/19 at 13:00


Levetiracetam 250 mg/Dextrose 102.5 ml @  430 mls/hr Q12 IVPB  Last administered


on 2/15/19at 20:54; Admin Dose 430 MLS/HR;  Start 2/11/19 at 21:00


Lansoprazole (Prevacid) 30 mg DAILY@06 PO  Last administered on 2/16/19at 05:30;


Admin Dose 30 MG;  Start 2/12/19 at 06:00


Norepinephrine 250 ml @  1.875 mls/ hr TITRATE IV ;  Start 2/13/19 at 10:30





Assessment/Plan


Hospital Course (Demo Recall)


IMP:


1.  Encephalopathy--post-ictal state s/p status epilepticus.  Possibly secondary


to elevated phenobarbital level.


2.  Vent Dependence 2/2 #1


3.  Metabolic acidosis likely secondary to hypoperfusion.  Now improved


4.  Status post septic Shock


5.  s/p lactic acidosis 


 


RECS: 


1.  Titrate levophed to MAP > 65 mm Hg


2.  Intravenous antibiotics; de-escalate pending cultures


3.  Continue antiepileptics per Neuro 


4.  Minimize sedation; continue to follow neuro exam


5.  DVT and GI prophylaxis 





Discussed with neurology this morning.  Phenobarbital level is now 7.9 and 


unlikely to interfere with her neurological status.  Family still wished to 


proceed to comfort care is consistent with patient's wishes.  We will therefore 


proceed with extubation


Inpatient hospice as needed.











RIC CAROLINA MD, [facility]     Feb 16, 2019 10:02

## 2019-02-17 VITALS — RESPIRATION RATE: 6 BRPM

## 2019-02-17 VITALS — RESPIRATION RATE: 10 BRPM

## 2019-02-17 VITALS — RESPIRATION RATE: 11 BRPM

## 2019-02-17 VITALS — RESPIRATION RATE: 8 BRPM

## 2019-02-17 VITALS — RESPIRATION RATE: 12 BRPM

## 2019-02-17 LAB — VZV IGG SER IA-ACNC: 428.6 INDEX (ref ?–0.9)

## 2019-02-17 RX ADMIN — ATROPINE SULFATE PRN DROP: 10 SOLUTION/ DROPS OPHTHALMIC at 06:33

## 2019-02-17 RX ADMIN — CARBOXYMETHYLCELLULOSE SODIUM 1 DROP: 10 GEL OPHTHALMIC at 02:49

## 2019-02-17 RX ADMIN — ATROPINE SULFATE 1 DROP: 10 SOLUTION/ DROPS OPHTHALMIC at 18:23

## 2019-02-17 RX ADMIN — CARBOXYMETHYLCELLULOSE SODIUM PRN DROP: 10 GEL OPHTHALMIC at 02:49

## 2019-02-17 RX ADMIN — Medication 1 MLS/HR: at 18:25

## 2019-02-17 RX ADMIN — ATROPINE SULFATE PRN DROP: 10 SOLUTION/ DROPS OPHTHALMIC at 04:31

## 2019-02-17 RX ADMIN — Medication SCH MLS/HR: at 09:48

## 2019-02-17 RX ADMIN — Medication 1 MLS/HR: at 09:48

## 2019-02-17 RX ADMIN — ATROPINE SULFATE 1 DROP: 10 SOLUTION/ DROPS OPHTHALMIC at 06:33

## 2019-02-17 RX ADMIN — Medication SCH MLS/HR: at 14:51

## 2019-02-17 RX ADMIN — Medication SCH MLS/HR: at 06:38

## 2019-02-17 RX ADMIN — ATROPINE SULFATE 1 DROP: 10 SOLUTION/ DROPS OPHTHALMIC at 04:31

## 2019-02-17 RX ADMIN — ATROPINE SULFATE PRN DROP: 10 SOLUTION/ DROPS OPHTHALMIC at 18:23

## 2019-02-17 RX ADMIN — LORAZEPAM PRN MG: 2 INJECTION, SOLUTION INTRAMUSCULAR; INTRAVENOUS at 10:53

## 2019-02-17 RX ADMIN — Medication 1 MLS/HR: at 14:51

## 2019-02-17 RX ADMIN — Medication SCH MLS/HR: at 18:25

## 2019-02-17 RX ADMIN — Medication 1 MLS/HR: at 06:38

## 2019-02-17 RX ADMIN — CARBOXYMETHYLCELLULOSE SODIUM PRN DROP: 10 GEL OPHTHALMIC at 18:22

## 2019-02-17 RX ADMIN — CARBOXYMETHYLCELLULOSE SODIUM 1 DROP: 10 GEL OPHTHALMIC at 18:22

## 2019-02-17 RX ADMIN — LORAZEPAM 1 MG: 2 INJECTION, SOLUTION INTRAMUSCULAR; INTRAVENOUS at 10:53

## 2019-02-17 NOTE — HP
DATE OF ADMISSION: 02/03/2019

 

CHIEF COMPLAINT AND HISTORY OF PRESENT ILLNESS:  History is obtained from patient's medical record an
d also discussion with the patient's .  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
st Nile virus encephalitis, who has been nonverbal and bedridden and was being fed pureed diet at [facility].  The patient was being cared by her  for over 1 year.  The patient was brought into ER at [facility] on 02/03/2019 due to altered mental status and new onset of seizure.  The
 patient was also diagnosed with urinary tract infection.  The patient developed acute respiratory fa
ilure and was seen by Dr. Willow Kumar from neurology standpoint.  The patient had EEG done which r
evealed slowing of the background with generalized periodic epileptiform discharges indicating severe
 diffuse cortical dysfunction of nonspecific etiology.  The patient remains nonverbal and palliative 
care consult was obtained.  The patient's family subsequently decided to remove the ventilator and pl
aced the patient on comfort care only.  The patient has no quality of life and patient's  did 
not want to have G-tube or continued life support including tracheostomy.  The patient was admitted a
[facility] level of care for respiratory failure.  The patient did not have any seizure on the day of admi
ssion.  No reported fever.  No reported vomiting.  No reported leg edema.  No reported abdominal dist
ention.  No reported bleeding from any site.

 

REVIEW OF SYSTEMS:  Rather limited.

 

PAST MEDICAL HISTORY:  As stated above.  In addition, the patient also has history of dyslipidemia an
d hypertension.

 

SOCIAL HISTORY:  No smoking, no alcohol.

 

FAMILY HISTORY:  The patient's mother had Alzheimer's disease.

 

PHYSICAL EXAMINATION

GENERAL:  Revealed the patient to be breathing comfortably on ventilator.

HEENT:  No eye discharge or redness.  Conjunctivae and lids are normal.  Nose and ears are normal.

NECK:  No mass.  No JVD.

CHEST:  Diminished air entry at bases.  No use of accessory muscles.

CARDIOVASCULAR:  S1, S2 are normal.  No murmur.

ABDOMEN:  Soft, nondistended, nontender.

EXTREMITIES:  No leg edema.  Pedal pulses are palpable.

SKIN:  Without acute rash.

NEUROLOGIC:  The patient is noncommunicative.

 

LABORATORY DATA:  Done on the day of admission, WBC 12.5, hemoglobin 7.9, platelet 343.  Sodium 129, 
potassium 4.5, BUN 12, creatinine 0.4, glucose 117.  AST 23, ALT 26, albumin 2.5.

 

IMPRESSION:

1.  West Nile virus encephalitis.  Now, the patient has chronic encephalopathy.

2.  Seizure disorder.

3.  Recent acute respiratory failure.

4.  History of hypertension.

5.  Dyslipidemia.

 

PLAN:  The patient has been made DNR and will be placed on comfort care only.  The patient will be gi
everardo IV morphine 5 mg and IV Ativan 2 mg prior to external extubation and will be started on morphine 
drip at 1 mg an hour.  We will also put the patient on IV Ativan 1 mg q.2 p.r.n. for seizures or term
inal anxiety.  We will add scopolamine patch and atropine drops for secretions, IV Zofran for vomitin
g.  The patient will be also placed on Tylenol suppository q.4 p.r.n. for temperature.  Plan of care 
was discussed with the patient's nurse, Rodney nurse.  We will continue to optimize comfort 
care.

 

 

Dictated By: ROCKY GARLAND/MAKENNA

DD:    02/17/2019 10:30:03

DT:    02/17/2019 12:47:43

Conf#: [id number]

DID#:  [id number]

CC: IRC CAROLINA MD; LAURENCE CASTELLANO; TEJAS SALMERON MD;*End*

## 2019-02-18 VITALS — RESPIRATION RATE: 10 BRPM

## 2019-02-18 RX ADMIN — CARBOXYMETHYLCELLULOSE SODIUM PRN DROP: 10 GEL OPHTHALMIC at 08:12

## 2019-02-18 RX ADMIN — CARBOXYMETHYLCELLULOSE SODIUM 1 DROP: 10 GEL OPHTHALMIC at 08:12

## 2019-02-18 RX ADMIN — Medication 1 MLS/HR: at 05:53

## 2019-02-18 RX ADMIN — ACETAMINOPHEN 1 MG: 650 SUPPOSITORY RECTAL at 10:37

## 2019-02-18 RX ADMIN — LORAZEPAM PRN MG: 2 INJECTION, SOLUTION INTRAMUSCULAR; INTRAVENOUS at 08:12

## 2019-02-18 RX ADMIN — ATROPINE SULFATE PRN DROP: 10 SOLUTION/ DROPS OPHTHALMIC at 08:12

## 2019-02-18 RX ADMIN — Medication SCH MLS/HR: at 05:53

## 2019-02-18 RX ADMIN — LORAZEPAM 1 MG: 2 INJECTION, SOLUTION INTRAMUSCULAR; INTRAVENOUS at 08:12

## 2019-02-18 RX ADMIN — ATROPINE SULFATE 1 DROP: 10 SOLUTION/ DROPS OPHTHALMIC at 08:12

## 2019-02-18 NOTE — PN
DATE:  02/17/2019

 

 

SUBJECTIVE:  Follow up on acute respiratory failure, viral encephalitis, dysphagia.  The patient has 
continued to decline and was increasingly short of breath after extubation and morphine dose has been
 increased to 8 mg an hour.  The patient seems more comfortable now and she has not had any fever or 
chills.  No reported seizure.  No reported vomiting.  The patient has not had any p.o. intake.

 

PHYSICAL EXAMINATION:

GENERAL:  Revealed the patient to be lethargic.

VITAL SIGNS:  Respiratory rate is between 6 to 8.  The patient is on 2 liters nasal cannula, blood pr
essure 108/69, pulse 96.

NECK:  No mass.

CHEST:  Diminished air entry at bases.  No use of accessory muscles.

CARDIOVASCULAR:  S1, S2 normal.  No murmur.

ABDOMEN:  Soft, nondistended, nontender.

EXTREMITIES:  No edema.

NEUROLOGIC:  The patient is nonverbal.

 

IMPRESSION:

1.  Acute respiratory failure.

2.  Encephalopathy due to viral encephalitis.

3.  Anemia.

4.  Seizure disorder.

 

PLAN:  As mentioned above.  Morphine dose has been increased to 8 mg an hour.  Continue scopolamine p
atch and atropine drops for secretion.  Continue IV Ativan on p.r.n. basis for seizures.  The patient
 has not had any breakthrough seizures since admission.  [facility] condition and plan of care.  I also spoke with VITAS nurse and nursing sta
ff at [facility].  We will continue to optimize comfort care.

 

 

Dictated By: ROCKY GARLAND/NTS

DD:    02/17/2019 22:13:30

DT:    02/18/2019 01:23:09

Conf#: [id number]

DID#:  [id number]

CC: LAURENCE CASTELLANO; RIC CARLOINA MD;*End*

## 2019-02-18 NOTE — DES
DATE OF ADMISSION: 2019

DATE OF DEATH:     2019

 

DATE OF EXPIRY:  2019.

 

TIME:  12:08 p.m.

 

CAUSE OF DEATH:  West Nile encephalitis.

 

COMORBID CONDITION:  Acute respiratory failure, seizure disorder, and anemia.

 

REASON FOR ADMISSION:  The patient was a 58-year-old female with a history of West Nile virus encepha
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  The patient 
was being cared by her  at home.  The patient was on pureed diet and was nonverbal.  The kaycee
nt was brought into hospital on 2019, because of altered mental status and new onset of seizure
.  The patient was diagnosed with new onset of seizure and also was in respiratory failure.  The manisha
ent was intubated and subsequently was extubated and put on BiPAP.  The patient was seen by Dr. Patricia Kumar from neurology standpoint and Dr. Hoffman's group from pulmonary standpoint.  Family reques
jonas comfort care and terminal extubation.  The patient was admitted on hospice on 2019 and was 
admitted at [facility] level of care.  The patient was extubated.  Prior to extubation, the patient was give
n 5 mg of morphine and 2 mg of Ativan and was put on morphine drip for comfort care.  The patient's h
shae was managed with a bolus dose of morphine and then subsequently patient was started on IV morph
ine drip.  The patient was also given IV Ativan 2 mg p.r.n. for anxiety and breakthrough seizure.  Ho
wever, the patient did not have any breakthrough seizures, although she did have terminal anxiety and
 did receive a dose of IV Ativan earlier this morning also.  The patient was placed on scopolamine pa
tch and atropine drops for secretions.  I spoke with the patient's  with the desk nurse as wel
l as [facility] staff nurse regarding the patient's condition and plan of care.  The patient did have a term
inal fever this morning  with a T-max 100.8.  The patient continued to decline and morphine dose was 
increased to 80 mg an hour.  The patient stopped breathing and had no pulse or blood pressure, and wa
s nonverbal.  The patient did not have any heart sounds or any spontaneous respiratory movement.  The
 patient's pupils were fixed and dilated.  The patient did not respond to painful stimuli and was pro
nounced  at 12:08 p.m. on 2019.  The patient's family was notified by the nurse.

 

 

Dictated By: ROCKY GARLAND/MAKENNA

DD:    2019 17:55:57

DT:    2019 19:46:43

Conf#: [id number]

DID#:  [id number]

CC: LAURENCE CASTELLANO;*EndCC*